# Patient Record
Sex: FEMALE | Race: WHITE | NOT HISPANIC OR LATINO | Employment: UNEMPLOYED | URBAN - METROPOLITAN AREA
[De-identification: names, ages, dates, MRNs, and addresses within clinical notes are randomized per-mention and may not be internally consistent; named-entity substitution may affect disease eponyms.]

---

## 2017-01-09 ENCOUNTER — THERAPY VISIT (OUTPATIENT)
Dept: PHYSICAL THERAPY | Facility: CLINIC | Age: 52
End: 2017-01-09
Payer: COMMERCIAL

## 2017-01-09 DIAGNOSIS — M25.511 ACUTE PAIN OF RIGHT SHOULDER: Primary | ICD-10-CM

## 2017-01-09 PROCEDURE — 97110 THERAPEUTIC EXERCISES: CPT | Mod: GP | Performed by: PHYSICAL THERAPIST

## 2017-01-09 PROCEDURE — 97140 MANUAL THERAPY 1/> REGIONS: CPT | Mod: GP | Performed by: PHYSICAL THERAPIST

## 2017-01-24 ENCOUNTER — THERAPY VISIT (OUTPATIENT)
Dept: PHYSICAL THERAPY | Facility: CLINIC | Age: 52
End: 2017-01-24
Payer: COMMERCIAL

## 2017-01-24 DIAGNOSIS — M25.511 ACUTE PAIN OF RIGHT SHOULDER: Primary | ICD-10-CM

## 2017-01-24 PROCEDURE — 97110 THERAPEUTIC EXERCISES: CPT | Mod: GP | Performed by: PHYSICAL THERAPIST

## 2017-01-24 PROCEDURE — 97140 MANUAL THERAPY 1/> REGIONS: CPT | Mod: GP | Performed by: PHYSICAL THERAPIST

## 2017-02-02 ENCOUNTER — THERAPY VISIT (OUTPATIENT)
Dept: PHYSICAL THERAPY | Facility: CLINIC | Age: 52
End: 2017-02-02
Payer: COMMERCIAL

## 2017-02-02 DIAGNOSIS — M25.511 ACUTE PAIN OF RIGHT SHOULDER: Primary | ICD-10-CM

## 2017-02-02 PROCEDURE — 97110 THERAPEUTIC EXERCISES: CPT | Mod: GP | Performed by: PHYSICAL THERAPIST

## 2017-02-02 PROCEDURE — 97140 MANUAL THERAPY 1/> REGIONS: CPT | Mod: GP | Performed by: PHYSICAL THERAPIST

## 2017-02-17 ENCOUNTER — THERAPY VISIT (OUTPATIENT)
Dept: PHYSICAL THERAPY | Facility: CLINIC | Age: 52
End: 2017-02-17
Payer: COMMERCIAL

## 2017-02-17 DIAGNOSIS — M25.511 ACUTE PAIN OF RIGHT SHOULDER: ICD-10-CM

## 2017-02-17 PROCEDURE — 97110 THERAPEUTIC EXERCISES: CPT | Mod: GP | Performed by: PHYSICAL THERAPIST

## 2017-02-17 PROCEDURE — 97140 MANUAL THERAPY 1/> REGIONS: CPT | Mod: GP | Performed by: PHYSICAL THERAPIST

## 2017-02-17 NOTE — PROGRESS NOTES
Subjective:    HPI                    Objective:    System    Physical Exam    General     ROS    Assessment/Plan:      PROGRESS  REPORT    Progress reporting period is from 12/29/2017 to 2/17/2017.       SUBJECTIVE  Subjective changes noted by patient:  The shoulder is better, but still gets pain at random times. More likely certain movements such as reaching out to the side, but not always. Most consistent pain is in the triceps in the morning. Currently pain is in the posterior shoulder.  Subjective:   Current Pain level: 2/10.     Initial Pain level: 9/10.   Changes in function:  Yes (See Goal flowsheet attached for changes in current functional level)  Adverse reaction to treatment or activity: None    OBJECTIVE  Changes noted in objective findings:  Yes, Patient demonstrates improved strength and less pain with testing.     Objective:    Cervical Screen: negative, negative spurlings  ULTT: Negative for radial nerve      Shoulder: (* indicates patient's pain)   MMT L MMT R   Flex/  Elevation  4+   GH Abd  4*   IR  4   ER  4+       Palpation: Tenderness at the lesser tubercle, scalene, and pec minor.     Special tests:   L R   Impingement     Neer's  (-)   Hawkin's-Joaquim  (-)   Internal impingement  (+)   Instability     Apprehension (anterior)  (-)   Relocation (anterior)  (-)   Biceps      Speed's  (-)     GH/Capsular Mobility  L  R   Posterior glide WNL WNL   Inferior glide WNL WNL   Anterior glide            ASSESSMENT/PLAN  Updated problem list and treatment plan:   Diagnosis 1: Biceps and Rotator cuff tendinitis of the right shoudler  Pain - hot/cold therapy, manual therapy, splint/taping/bracing/orthotics, self management, education, directional preference exercise and home program  Decreased ROM/flexibility - manual therapy, therapeutic exercise, therapeutic activity and home program  Decreased joint mobility - manual therapy, therapeutic exercise, therapeutic activity and home program  Decreased  strength - therapeutic exercise, therapeutic activities and home program  Decreased function - therapeutic activities and home program  Impaired posture - neuro re-education, therapeutic activities and home program  Instability - Therapeutic Activity, Therapeutic Exercise, Neuromuscular Re-education, Splinting/Taping/Bracing/Orthotic, home program    STG/LTGs have been met or progress has been made towards goals:  Yes (See Goal flow sheet completed today.)  Assessment of Progress: The patient's condition is improving.  The patient's condition has potential to improve.  Patient is meeting short term goals and is progressing towards long term goals.  Self Management Plans:  Patient is independent in a home treatment program.  Patient  has been instructed in self management of symptoms.  I have re-evaluated this patient and find that the nature, scope, duration and intensity of the therapy is appropriate for the medical condition of the patient.  Selene continues to require the following intervention to meet STG and LTG's:  PT    Recommendations:  This patient would benefit from continued therapy.     Frequency:  2 X a month, once daily  Duration:  for 2 months        Please refer to the daily flowsheet for treatment today, total treatment time and time spent performing 1:1 timed codes.

## 2017-02-20 ENCOUNTER — THERAPY VISIT (OUTPATIENT)
Dept: PHYSICAL THERAPY | Facility: CLINIC | Age: 52
End: 2017-02-20
Payer: COMMERCIAL

## 2017-02-20 DIAGNOSIS — M25.60 JOINT STIFFNESS OF SPINE: ICD-10-CM

## 2017-02-20 DIAGNOSIS — M54.42 CHRONIC MIDLINE LOW BACK PAIN WITH BILATERAL SCIATICA: ICD-10-CM

## 2017-02-20 DIAGNOSIS — M53.3 PAIN IN THE COCCYX: Primary | ICD-10-CM

## 2017-02-20 DIAGNOSIS — G89.29 CHRONIC MIDLINE LOW BACK PAIN WITH BILATERAL SCIATICA: ICD-10-CM

## 2017-02-20 DIAGNOSIS — M54.41 CHRONIC MIDLINE LOW BACK PAIN WITH BILATERAL SCIATICA: ICD-10-CM

## 2017-02-20 PROCEDURE — 97164 PT RE-EVAL EST PLAN CARE: CPT | Mod: GP | Performed by: PHYSICAL THERAPIST

## 2017-02-20 PROCEDURE — 97110 THERAPEUTIC EXERCISES: CPT | Mod: GP | Performed by: PHYSICAL THERAPIST

## 2017-02-20 NOTE — MR AVS SNAPSHOT
After Visit Summary   2/20/2017    Selene Lyn    MRN: [id number]           Patient Information     Date Of Birth          1965        Visit Information        Provider Department      2/20/2017 9:05 AM Nicki Kapadia, PT [facility]        Today's Diagnoses     Pain in the coccyx    -  1    Chronic midline low back pain with bilateral sciatica        Joint stiffness of spine           Follow-ups after your visit        Your next 10 appointments already scheduled     Feb 23, 2017 11:20 AM CST   [facility] Spine with Nicki Kapadia, PT   [facility] ([facility] [place]  )    [id number] [place] [address] Drive  [address] [place] [address]   [phone]            Mar 02, 2017 11:20 AM CST   [facility] Spine with Nicki Kapadia, PT   [facility] ([facility] [place]  )    [id number] [place] [address]  [address] [place] [address]   [phone]            Mar 03, 2017  8:50 AM CST   [facility] Extremity with Michael GUZMAN Ho   [facility] ([facility] [place]  )    [address] [place] [address]  [address] [place] [address]   [phone]            Mar 09, 2017  9:20 AM CST   [facility] Spine with Nicki Kapadia, PT   [facility] ([facility] [place]  )    [id number] [place] [address]  [address] [place] [address]   [phone]            Mar 23, 2017  8:00 AM CDT   [facility] Spine with Nicki Kapadia, PT   [facility] ([facility] [place]  )    [address] [place] [address]  [address] [place] [address]   [phone]              Who to contact     If you have questions or need follow up information about today's clinic visit or your schedule please contact [facility] directly at [phone].  Normal or non-critical lab and imaging results will be communicated to you by MyChart, letter or phone within 4 business days after the clinic has received the results. If you do not hear from us within 7 days, please contact the clinic through MyChart or phone. If you have a critical or abnormal lab result, we  "will notify you by phone as soon as possible.  Submit refill requests through [employer] or call your pharmacy and they will forward the refill request to us. Please allow 3 business days for your refill to be completed.          Additional Information About Your Visit        [employer] Information     [employer] lets you send messages to your doctor, view your test results, renew your prescriptions, schedule appointments and more. To sign up, go to [email or web address] . Click on \"Log in\" on the left side of the screen, which will take you to the Welcome page. Then click on \"Sign up Now\" on the right side of the page.     You will be asked to enter the access code listed below, as well as some personal information. Please follow the directions to create your username and password.     Your access code is: [email or web address]  Expires: 2017  8:50 AM     Your access code will  in 90 days. If you need help or a new code, please call your [place] clinic or [phone].        Care EveryWhere ID     This is your Care EveryWhere ID. This could be used by other organizations to access your [facility] medical records  [id number]         Blood Pressure from Last 3 Encounters:   16 98/62   10/11/16 110/70   16 104/60    Weight from Last 3 Encounters:   16 57.2 kg (126 lb 3.2 oz)   10/11/16 57.2 kg (126 lb)   16 56.7 kg (125 lb 1.6 oz)              We Performed the Following      PT RE-EVAL     [facility] PROGRESS NOTES REPORT     THERAPEUTIC EXERCISES        Primary Care Provider Office Phone # Fax #    Vonda Zaidi [phone]       [facility] PHYS [address]  [address]  [facility] [address]        Thank you!     Thank you for choosing [facility]  for your care. Our goal is always to provide you with excellent care. Hearing back from our patients is one way we can continue to improve our services. Please take a few minutes to complete the written survey that you " may receive in the mail after your visit with us. Thank you!             Your Updated Medication List - Protect others around you: Learn how to safely use, store and throw away your medicines at www.disposemymeds.org.          This list is accurate as of: 2/20/17 11:59 PM.  Always use your most recent med list.                   Brand Name Dispense Instructions for use    albuterol 108 (90 BASE) MCG/ACT Inhaler    albuterol    1 Inhaler    Inhale 2 puffs into the lungs every 6 hours as needed for shortness of breath / dyspnea       ALEVE PO          fluticasone 110 MCG/ACT Inhaler    FLOVENT HFA    3 Inhaler    Inhale 1 puff into the lungs 2 times daily During allergy season as needed       montelukast 10 MG tablet    SINGULAIR    30 tablet    Take 1 tablet (10 mg) by mouth At Bedtime       MULTIVITAMIN PO      Take  by mouth.       pantoprazole 40 MG EC tablet    PROTONIX    90 tablet    Take 1 tablet (40 mg) by mouth daily       VITAMIN D3          VITAMIN E NATURAL PO

## 2017-02-20 NOTE — LETTER
[facility]  [address] [address]  [address]  [facility] [address]  [phone]  2017    Re: Selene Lyn   :   1965  MRN:  [id number]   REFERRING PHYSICIAN:   Yohannes PERE[facility] PT  Date of Initial Evaluation:  2016  Visits:  Rxs Used: 21 (1st visit in 2017)  Reason for Referral:     Chronic midline low back pain with bilateral sciatica  Joint stiffness of spine  Pain in the coccyx  PROGRESS  REPORT  Progress reporting period is from 17.       SUBJECTIVE  Subjective changes noted by patient:   Tailbone pain started 2016 on drive home from [place]. WORSE with sitting on bleachers/hard surface, standing somewhat. No bowel/bladder symptoms. Some mild LBP because standing more. Sitting tolerance on good seat 20-30 minutes but then gets very uncomfortable. On a hard chair, limited to 1-2 minutes. 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     Current pain level is  Current Pain level: 6/10 (tailbone).     Previous pain level was  : 9/10 (tailbone).   Changes in function:  Yes (See Goal flowsheet attached for changes in current functional level)  Adverse reaction to treatment or activity: sitting    OBJECTIVE  Changes noted in objective findings:    Objective: Lumbar flexion to ankles painfree, extension WNL with end range stiffness. Pelvic alignment in standing PSIS level, decreased lordosis. Prone palpation to central coccyx are painful 8/10 but unable to feel coccyx in prone. With OLIVER, can feel coccyx. B glute squeeze = and painfree.      ASSESSMENT/PLAN  Updated problem list and treatment plan: Diagnosis 1:  Coccyx pain  Pain -  hot/cold therapy, manual therapy, splint/taping/bracing/orthotics, self management, education, directional preference exercise and home program  Decreased ROM/flexibility - manual therapy and therapeutic exercise  Decreased joint mobility - manual therapy and  therapeutic exercise  Decreased strength - therapeutic exercise and therapeutic activities  Inflammation - cold therapy and self management/home program  Impaired muscle performance - neuro re-education  Decreased function - therapeutic activities  Re: Selene Lyn   :   1965  Impaired posture - neuro re-education  STG/LTGs have been met or progress has been made towards goals:  Yes (See Goal flow sheet completed today.)  Assessment of Progress: The patient's progress has plateaued.  Self Management Plans:  Patient has been instructed in a home treatment program.  Patient  has been instructed in self management of symptoms.    Selene continues to require the following intervention to meet STG and LTG's:  PT    Recommendations:  This patient would benefit from continued therapy.     Frequency:  1 X week, once daily  Duration:  for 4 weeks              Thank you for your referral.    INQUIRIES  Therapist: DOROTHY Cao [facility]  [address] [address] [address]  Phone: [phone]  Fax: [phone]

## 2017-02-21 PROBLEM — M53.3 PAIN IN THE COCCYX: Status: ACTIVE | Noted: 2017-02-21

## 2017-02-21 NOTE — PROGRESS NOTES
Subjective:    HPI                    Objective:    System    Physical Exam    General     ROS    Assessment/Plan:      PROGRESS  REPORT    Progress reporting period is from 02/20/17.       SUBJECTIVE  Subjective changes noted by patient:   Tailbone pain started August 2016 on drive home from [place]. WORSE with sitting on bleachers/hard surface, standing somewhat. No bowel/bladder symptoms. Some mild LBP because standing more. Sitting tolerance on good seat 20-30 minutes but then gets very uncomfortable. On a hard chair, limited to 1-2 minutes. 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     Current pain level is  Current Pain level: 6/10 (tailbone).     Previous pain level was  : 9/10 (tailbone).   Changes in function:  Yes (See Goal flowsheet attached for changes in current functional level)  Adverse reaction to treatment or activity: sitting    OBJECTIVE  Changes noted in objective findings:    Objective: Lumbar flexion to ankles painfree, extension WNL with end range stiffness. Pelvic alignment in standing PSIS level, decreased lordosis. Prone palpation to central coccyx are painful 8/10 but unable to feel coccyx in prone. With OLIVER, can feel coccyx. B glute squeeze = and painfree.      ASSESSMENT/PLAN  Updated problem list and treatment plan: Diagnosis 1:  Coccyx pain  Pain -  hot/cold therapy, manual therapy, splint/taping/bracing/orthotics, self management, education, directional preference exercise and home program  Decreased ROM/flexibility - manual therapy and therapeutic exercise  Decreased joint mobility - manual therapy and therapeutic exercise  Decreased strength - therapeutic exercise and therapeutic activities  Inflammation - cold therapy and self management/home program  Impaired muscle performance - neuro re-education  Decreased function - therapeutic activities  Impaired posture - neuro re-education  STG/LTGs have been met or  progress has been made towards goals:  Yes (See Goal flow sheet completed today.)  Assessment of Progress: The patient's progress has plateaued.  Self Management Plans:  Patient has been instructed in a home treatment program.  Patient  has been instructed in self management of symptoms.    Selene continues to require the following intervention to meet STG and LTG's:  PT    Recommendations:  This patient would benefit from continued therapy.     Frequency:  1 X week, once daily  Duration:  for 4 weeks        Please refer to the daily flowsheet for treatment today, total treatment time and time spent performing 1:1 timed codes.

## 2017-03-02 ENCOUNTER — THERAPY VISIT (OUTPATIENT)
Dept: PHYSICAL THERAPY | Facility: CLINIC | Age: 52
End: 2017-03-02
Payer: COMMERCIAL

## 2017-03-02 DIAGNOSIS — M54.42 CHRONIC MIDLINE LOW BACK PAIN WITH BILATERAL SCIATICA: ICD-10-CM

## 2017-03-02 DIAGNOSIS — G89.29 CHRONIC MIDLINE LOW BACK PAIN WITH BILATERAL SCIATICA: ICD-10-CM

## 2017-03-02 DIAGNOSIS — M53.3 PAIN IN THE COCCYX: ICD-10-CM

## 2017-03-02 DIAGNOSIS — M25.60 JOINT STIFFNESS OF SPINE: ICD-10-CM

## 2017-03-02 DIAGNOSIS — M54.41 CHRONIC MIDLINE LOW BACK PAIN WITH BILATERAL SCIATICA: ICD-10-CM

## 2017-03-02 PROCEDURE — 97530 THERAPEUTIC ACTIVITIES: CPT | Mod: GP | Performed by: PHYSICAL THERAPIST

## 2017-03-02 PROCEDURE — 97140 MANUAL THERAPY 1/> REGIONS: CPT | Mod: GP | Performed by: PHYSICAL THERAPIST

## 2017-03-03 ENCOUNTER — THERAPY VISIT (OUTPATIENT)
Dept: PHYSICAL THERAPY | Facility: CLINIC | Age: 52
End: 2017-03-03
Payer: COMMERCIAL

## 2017-03-03 DIAGNOSIS — M25.511 ACUTE PAIN OF RIGHT SHOULDER: ICD-10-CM

## 2017-03-03 PROCEDURE — 97140 MANUAL THERAPY 1/> REGIONS: CPT | Mod: GP | Performed by: PHYSICAL THERAPIST

## 2017-03-03 PROCEDURE — 97110 THERAPEUTIC EXERCISES: CPT | Mod: GP | Performed by: PHYSICAL THERAPIST

## 2017-03-09 ENCOUNTER — THERAPY VISIT (OUTPATIENT)
Dept: PHYSICAL THERAPY | Facility: CLINIC | Age: 52
End: 2017-03-09
Payer: COMMERCIAL

## 2017-03-09 DIAGNOSIS — M54.41 CHRONIC MIDLINE LOW BACK PAIN WITH BILATERAL SCIATICA: ICD-10-CM

## 2017-03-09 DIAGNOSIS — M53.3 PAIN IN THE COCCYX: ICD-10-CM

## 2017-03-09 DIAGNOSIS — G89.29 CHRONIC MIDLINE LOW BACK PAIN WITH BILATERAL SCIATICA: ICD-10-CM

## 2017-03-09 DIAGNOSIS — M25.60 JOINT STIFFNESS OF SPINE: ICD-10-CM

## 2017-03-09 DIAGNOSIS — M54.42 CHRONIC MIDLINE LOW BACK PAIN WITH BILATERAL SCIATICA: ICD-10-CM

## 2017-03-09 PROCEDURE — 97140 MANUAL THERAPY 1/> REGIONS: CPT | Mod: GP | Performed by: PHYSICAL THERAPIST

## 2017-03-09 PROCEDURE — 97110 THERAPEUTIC EXERCISES: CPT | Mod: GP | Performed by: PHYSICAL THERAPIST

## 2017-03-23 ENCOUNTER — THERAPY VISIT (OUTPATIENT)
Dept: PHYSICAL THERAPY | Facility: CLINIC | Age: 52
End: 2017-03-23
Payer: COMMERCIAL

## 2017-03-23 DIAGNOSIS — M54.41 CHRONIC MIDLINE LOW BACK PAIN WITH BILATERAL SCIATICA: ICD-10-CM

## 2017-03-23 DIAGNOSIS — M54.42 CHRONIC MIDLINE LOW BACK PAIN WITH BILATERAL SCIATICA: ICD-10-CM

## 2017-03-23 DIAGNOSIS — G89.29 CHRONIC MIDLINE LOW BACK PAIN WITH BILATERAL SCIATICA: ICD-10-CM

## 2017-03-23 DIAGNOSIS — M53.3 PAIN IN THE COCCYX: ICD-10-CM

## 2017-03-23 DIAGNOSIS — M25.60 JOINT STIFFNESS OF SPINE: ICD-10-CM

## 2017-03-23 PROCEDURE — 97110 THERAPEUTIC EXERCISES: CPT | Mod: GP | Performed by: PHYSICAL THERAPIST

## 2017-03-23 PROCEDURE — 97140 MANUAL THERAPY 1/> REGIONS: CPT | Mod: GP | Performed by: PHYSICAL THERAPIST

## 2017-03-28 ENCOUNTER — THERAPY VISIT (OUTPATIENT)
Dept: PHYSICAL THERAPY | Facility: CLINIC | Age: 52
End: 2017-03-28
Payer: COMMERCIAL

## 2017-03-28 DIAGNOSIS — M25.511 ACUTE PAIN OF RIGHT SHOULDER: ICD-10-CM

## 2017-03-28 PROCEDURE — 97110 THERAPEUTIC EXERCISES: CPT | Mod: GP | Performed by: PHYSICAL THERAPIST

## 2017-03-28 PROCEDURE — 97140 MANUAL THERAPY 1/> REGIONS: CPT | Mod: GP | Performed by: PHYSICAL THERAPIST

## 2017-03-28 NOTE — PROGRESS NOTES
Subjective:    HPI                    Objective:    System    Physical Exam    General     ROS    Assessment/Plan:      PROGRESS  REPORT    Progress reporting period is from 2/17/2107 to 3/28/2017.       SUBJECTIVE  Subjective changes noted by patient: Hit some golf balls on sunday and has some achy spots in the pec and posterior shoudler blade. The spot in the pec has a sharp quality to it. Prior to that, she had been doing really well.     Current Pain level: 4/10.     Initial Pain level: 9/10.   Changes in function:  Yes (See Goal flowsheet attached for changes in current functional level)  Adverse reaction to treatment or activity: None    OBJECTIVE  Changes noted in objective findings:  Yes, Improved strength.     Shoulder: (* indicates patient's pain)   MMT L MMT R   Flex/  Elevation  5-   GH Abd  5-   IR  5-   ER  5-     Palpation: Tenderness at the lesser tubercle, improved with subscapular release.     Special tests:   L R   Impingement     Internal impingement  (+)   Instability     Relocation (anterior)  (+)       ASSESSMENT/PLAN  Updated problem list and treatment plan: Diagnosis 1:  Biceps and Rotator cuff tendinitis of the right shoulder  STG/LTGs have been met or progress has been made towards goals:  Yes (See Goal flow sheet completed today.)  Assessment of Progress: The patient's condition is improving.  The patient's condition has potential to improve.  Self Management Plans:  Patient is independent in a home treatment program.  Patient is independent in self management of symptoms.  I have re-evaluated this patient and find that the nature, scope, duration and intensity of the therapy is appropriate for the medical condition of the patient.  Selene continues to require the following intervention to meet STG and LTG's:  PT    Recommendations:  Patient has been instructed to return if current symptoms do not subside. Prior to the exacerbation from golfing, she had been doing very well.   Patient will  return if needed, otherwise she appears ready to be discharged from therapy.     Please refer to the daily flowsheet for treatment today, total treatment time and time spent performing 1:1 timed codes.

## 2017-04-03 ENCOUNTER — THERAPY VISIT (OUTPATIENT)
Dept: PHYSICAL THERAPY | Facility: CLINIC | Age: 52
End: 2017-04-03
Payer: COMMERCIAL

## 2017-04-03 DIAGNOSIS — M53.3 PAIN IN THE COCCYX: ICD-10-CM

## 2017-04-03 DIAGNOSIS — M25.60 JOINT STIFFNESS OF SPINE: ICD-10-CM

## 2017-04-03 DIAGNOSIS — G89.29 CHRONIC MIDLINE LOW BACK PAIN WITH BILATERAL SCIATICA: ICD-10-CM

## 2017-04-03 DIAGNOSIS — M54.42 CHRONIC MIDLINE LOW BACK PAIN WITH BILATERAL SCIATICA: ICD-10-CM

## 2017-04-03 DIAGNOSIS — M54.41 CHRONIC MIDLINE LOW BACK PAIN WITH BILATERAL SCIATICA: ICD-10-CM

## 2017-04-03 PROCEDURE — 97140 MANUAL THERAPY 1/> REGIONS: CPT | Mod: GP | Performed by: PHYSICAL THERAPIST

## 2017-04-03 PROCEDURE — 97110 THERAPEUTIC EXERCISES: CPT | Mod: GP | Performed by: PHYSICAL THERAPIST

## 2017-04-03 NOTE — LETTER
[facility]  [address] [address]  [address]  [facility] [address]  [phone]    April 3, 2017    Re: Selene Lyn   :   1965  MRN:  [id number]   REFERRING PHYSICIAN:   Yohannes [facility]ERE[facility] PT    Date of Initial Evaluation: 2016  Visits:  Rxs Used: 5  Reason for Referral:     Pain in the coccyx  Chronic midline low back pain with bilateral sciatica  Joint stiffness of spine    PROGRESS  REPORT    Progress reporting period is from 17.       SUBJECTIVE  Subjective changes noted by patient:  Better this week. Able to sit longer 25 minutes on cushion chair.  Having more good days than bad days. Denies any clicking/popping. Pain varies from 0-5/10. Will linger for a few minutes after standing. Standing much less painful after sitting.     Current pain level is  Current Pain level: 0/10 (varies 0-5/10. ).     Previous pain level was   Initial Pain level: 9/10 (tailbone).   Changes in function:  Yes (See Goal flowsheet attached for changes in current functional level)  Adverse reaction to treatment or activity: None  OBJECTIVE  Changes noted in objective findings:    Objective: Improving coccyx mobility into extension. No deviation R or L. Levator ani /puborectalis muscle L>R but softens up nicely.    ASSESSMENT/PLAN  Updated problem list and treatment plan: Diagnosis 1:  Coccyx pain  Pain -  hot/cold therapy, manual therapy, splint/taping/bracing/orthotics, self management, education, directional preference exercise and home program  Decreased ROM/flexibility - manual therapy and therapeutic exercise  Decreased joint mobility - manual therapy and therapeutic exercise  Decreased strength - therapeutic exercise and therapeutic activities  Decreased proprioception - neuro re-education and therapeutic activities  Impaired muscle performance - neuro re-education  Decreased function - therapeutic activities  Impaired posture - neuro re-education  STG/LTGs have been met or progress has  been made towards goals:  Yes (See Goal flow sheet completed today.)  Assessment of Progress: The patient's condition is improving.  Self Management Plans:  Patient has been instructed in a home treatment program.  Patient  has been instructed in self management of symptoms.    Selene continues to require the following intervention to meet STG and LTG's:  PT  Re: Selene Lyn   :   1965    Recommendations:  This patient would benefit from continued therapy.     Frequency:  2 X a month, once daily  Duration:  for 1 months    Thank you for your referral.    INQUIRIES  Therapist: Nicki Kapadia, MS, PT, OCS  [facility]  [address] [address] [address]  Phone: [phone]  Fax: [phone]

## 2017-04-03 NOTE — MR AVS SNAPSHOT
"              After Visit Summary   4/3/2017    Selene Lyn    MRN: [id number]           Patient Information     Date Of Birth          1965        Visit Information        Provider Department      4/3/2017 11:45 AM Nicki Kapadia, [facility]        Today's Diagnoses     Pain in the coccyx        Chronic midline low back pain with bilateral sciatica        Joint stiffness of spine           Follow-ups after your visit        Your next 10 appointments already scheduled     Apr 20, 2017  8:00 AM CDT   [facility] Spine with Nicki Kapadia PT   [facility] ([facility]  )    [address] [facility]  [address] [place] [address]   [phone]            May 04, 2017  8:00 AM CDT   [facility] Spine with BRAEDEN Cao PT ([facility]  )    [address] [facility]  [address] [place] [address]   [phone]              Who to contact     If you have questions or need follow up information about today's clinic visit or your schedule please contact [facility] directly at [phone].  Normal or non-critical lab and imaging results will be communicated to you by [employer], letter or phone within 4 business days after the clinic has received the results. If you do not hear from us within 7 days, please contact the clinic through [employer] or phone. If you have a critical or abnormal lab result, we will notify you by phone as soon as possible.  Submit refill requests through [employer] or call your pharmacy and they will forward the refill request to us. Please allow 3 business days for your refill to be completed.          Additional Information About Your Visit        [employer] Information     [employer] lets you send messages to your doctor, view your test results, renew your prescriptions, schedule appointments and more. To sign up, go to [email or web address] . Click on \"Log in\" on the left side of the screen, which will take you to the Welcome page. Then " "click on \"Sign up Now\" on the right side of the page.     You will be asked to enter the access code listed below, as well as some personal information. Please follow the directions to create your username and password.     Your access code is: [email or web address]  Expires: 2017  9:50 AM     Your access code will  in 90 days. If you need help or a new code, please call your [place] clinic or [phone].        Care EveryWhere ID     This is your Care EveryWhere ID. This could be used by other organizations to access your [place] medical records  [id number]         Blood Pressure from Last 3 Encounters:   16 98/62   10/11/16 110/70   16 104/60    Weight from Last 3 Encounters:   16 57.2 kg (126 lb 3.2 oz)   10/11/16 57.2 kg (126 lb)   16 56.7 kg (125 lb 1.6 oz)              We Performed the Following     [facility] PROGRESS NOTES REPORT     MANUAL THER TECH,1+REGIONS,EA 15 MIN     THERAPEUTIC EXERCISES        Primary Care Provider Office Phone # Fax #    Vonda Zaidi [phone]       [facility] PHYS [address]  [address]  [facility] [address]        Thank you!     Thank you for choosing [facility]  for your care. Our goal is always to provide you with excellent care. Hearing back from our patients is one way we can continue to improve our services. Please take a few minutes to complete the written survey that you may receive in the mail after your visit with us. Thank you!             Your Updated Medication List - Protect others around you: Learn how to safely use, store and throw away your medicines at www.disposemymeds.org.          This list is accurate as of: 4/3/17 12:33 PM.  Always use your most recent med list.                   Brand Name Dispense Instructions for use    albuterol 108 (90 BASE) MCG/ACT Inhaler    albuterol    1 Inhaler    Inhale 2 puffs into the lungs every 6 hours as needed for shortness of breath / dyspnea       ALEVE " PO          fluticasone 110 MCG/ACT Inhaler    FLOVENT HFA    3 Inhaler    Inhale 1 puff into the lungs 2 times daily During allergy season as needed       montelukast 10 MG tablet    SINGULAIR    30 tablet    Take 1 tablet (10 mg) by mouth At Bedtime       MULTIVITAMIN PO      Take  by mouth.       pantoprazole 40 MG EC tablet    PROTONIX    90 tablet    Take 1 tablet (40 mg) by mouth daily       VITAMIN D3          VITAMIN E NATURAL PO

## 2017-04-03 NOTE — PROGRESS NOTES
Subjective:    HPI                    Objective:    System    Physical Exam    General     ROS    Assessment/Plan:      PROGRESS  REPORT    Progress reporting period is from 04/03/17.       SUBJECTIVE  Subjective changes noted by patient:  Better this week. Able to sit longer 25 minutes on cushion chair.  Having more good days than bad days. Denies any clicking/popping. Pain varies from 0-5/10. Will linger for a few minutes after standing. Standing much less painful after sitting.     Current pain level is  Current Pain level: 0/10 (varies 0-5/10. ).     Previous pain level was   Initial Pain level: 9/10 (tailbone).   Changes in function:  Yes (See Goal flowsheet attached for changes in current functional level)  Adverse reaction to treatment or activity: None    OBJECTIVE  Changes noted in objective findings:    Objective: Improving coccyx mobility into extension. No deviation R or L. Levator ani /puborectalis muscle L>R but softens up nicely.      ASSESSMENT/PLAN  Updated problem list and treatment plan: Diagnosis 1:  Coccyx pain  Pain -  hot/cold therapy, manual therapy, splint/taping/bracing/orthotics, self management, education, directional preference exercise and home program  Decreased ROM/flexibility - manual therapy and therapeutic exercise  Decreased joint mobility - manual therapy and therapeutic exercise  Decreased strength - therapeutic exercise and therapeutic activities  Decreased proprioception - neuro re-education and therapeutic activities  Impaired muscle performance - neuro re-education  Decreased function - therapeutic activities  Impaired posture - neuro re-education  STG/LTGs have been met or progress has been made towards goals:  Yes (See Goal flow sheet completed today.)  Assessment of Progress: The patient's condition is improving.  Self Management Plans:  Patient has been instructed in a home treatment program.  Patient  has been instructed in self management of symptoms.    June continues  to require the following intervention to meet STG and LTG's:  PT    Recommendations:  This patient would benefit from continued therapy.     Frequency:  2 X a month, once daily  Duration:  for 1 months        Please refer to the daily flowsheet for treatment today, total treatment time and time spent performing 1:1 timed codes.

## 2017-04-19 ENCOUNTER — THERAPY VISIT (OUTPATIENT)
Dept: PHYSICAL THERAPY | Facility: CLINIC | Age: 52
End: 2017-04-19
Payer: COMMERCIAL

## 2017-04-19 DIAGNOSIS — M54.42 CHRONIC MIDLINE LOW BACK PAIN WITH BILATERAL SCIATICA: ICD-10-CM

## 2017-04-19 DIAGNOSIS — G89.29 CHRONIC MIDLINE LOW BACK PAIN WITH BILATERAL SCIATICA: ICD-10-CM

## 2017-04-19 DIAGNOSIS — M25.60 JOINT STIFFNESS OF SPINE: ICD-10-CM

## 2017-04-19 DIAGNOSIS — M53.3 PAIN IN THE COCCYX: ICD-10-CM

## 2017-04-19 DIAGNOSIS — M54.41 CHRONIC MIDLINE LOW BACK PAIN WITH BILATERAL SCIATICA: ICD-10-CM

## 2017-04-19 PROCEDURE — 97140 MANUAL THERAPY 1/> REGIONS: CPT | Mod: GP | Performed by: PHYSICAL THERAPIST

## 2017-04-19 PROCEDURE — 97110 THERAPEUTIC EXERCISES: CPT | Mod: GP | Performed by: PHYSICAL THERAPIST

## 2017-05-04 ENCOUNTER — THERAPY VISIT (OUTPATIENT)
Dept: PHYSICAL THERAPY | Facility: CLINIC | Age: 52
End: 2017-05-04
Payer: COMMERCIAL

## 2017-05-04 DIAGNOSIS — M54.42 CHRONIC MIDLINE LOW BACK PAIN WITH BILATERAL SCIATICA: ICD-10-CM

## 2017-05-04 DIAGNOSIS — M53.3 PAIN IN THE COCCYX: ICD-10-CM

## 2017-05-04 DIAGNOSIS — G89.29 CHRONIC MIDLINE LOW BACK PAIN WITH BILATERAL SCIATICA: ICD-10-CM

## 2017-05-04 DIAGNOSIS — M54.41 CHRONIC MIDLINE LOW BACK PAIN WITH BILATERAL SCIATICA: ICD-10-CM

## 2017-05-04 DIAGNOSIS — M25.60 JOINT STIFFNESS OF SPINE: ICD-10-CM

## 2017-05-04 PROCEDURE — 97110 THERAPEUTIC EXERCISES: CPT | Mod: GP | Performed by: PHYSICAL THERAPIST

## 2017-05-04 PROCEDURE — 97140 MANUAL THERAPY 1/> REGIONS: CPT | Mod: GP | Performed by: PHYSICAL THERAPIST

## 2017-05-10 NOTE — PROGRESS NOTES
Update as of May 10, 2017: Patient not returned to clinic. This progress note shall serve as a discharge note.

## 2017-05-18 ENCOUNTER — THERAPY VISIT (OUTPATIENT)
Dept: PHYSICAL THERAPY | Facility: CLINIC | Age: 52
End: 2017-05-18
Payer: COMMERCIAL

## 2017-05-18 DIAGNOSIS — M53.3 PAIN IN THE COCCYX: ICD-10-CM

## 2017-05-18 DIAGNOSIS — G89.29 CHRONIC MIDLINE LOW BACK PAIN WITH BILATERAL SCIATICA: ICD-10-CM

## 2017-05-18 DIAGNOSIS — M25.60 JOINT STIFFNESS OF SPINE: ICD-10-CM

## 2017-05-18 DIAGNOSIS — M54.41 CHRONIC MIDLINE LOW BACK PAIN WITH BILATERAL SCIATICA: ICD-10-CM

## 2017-05-18 DIAGNOSIS — M54.42 CHRONIC MIDLINE LOW BACK PAIN WITH BILATERAL SCIATICA: ICD-10-CM

## 2017-05-18 PROCEDURE — 97140 MANUAL THERAPY 1/> REGIONS: CPT | Mod: GP | Performed by: PHYSICAL THERAPIST

## 2017-05-18 PROCEDURE — 97110 THERAPEUTIC EXERCISES: CPT | Mod: GP | Performed by: PHYSICAL THERAPIST

## 2017-06-06 ENCOUNTER — THERAPY VISIT (OUTPATIENT)
Dept: PHYSICAL THERAPY | Facility: CLINIC | Age: 52
End: 2017-06-06
Payer: COMMERCIAL

## 2017-06-06 DIAGNOSIS — G89.29 CHRONIC MIDLINE LOW BACK PAIN WITH BILATERAL SCIATICA: ICD-10-CM

## 2017-06-06 DIAGNOSIS — M54.41 CHRONIC MIDLINE LOW BACK PAIN WITH BILATERAL SCIATICA: ICD-10-CM

## 2017-06-06 DIAGNOSIS — M53.3 PAIN IN THE COCCYX: ICD-10-CM

## 2017-06-06 DIAGNOSIS — M54.42 CHRONIC MIDLINE LOW BACK PAIN WITH BILATERAL SCIATICA: ICD-10-CM

## 2017-06-06 DIAGNOSIS — M25.60 JOINT STIFFNESS OF SPINE: ICD-10-CM

## 2017-06-06 PROCEDURE — 97110 THERAPEUTIC EXERCISES: CPT | Mod: GP | Performed by: PHYSICAL THERAPIST

## 2017-06-06 PROCEDURE — 97140 MANUAL THERAPY 1/> REGIONS: CPT | Mod: GP | Performed by: PHYSICAL THERAPIST

## 2017-06-06 NOTE — MR AVS SNAPSHOT
After Visit Summary   6/6/2017    Selene Lyn    MRN: [id number]           Patient Information     Date Of Birth          1965        Visit Information        Provider Department      6/6/2017 7:45 AM Nicki Kapadia, [facility]        Today's Diagnoses     Pain in the coccyx        Chronic midline low back pain with bilateral sciatica        Joint stiffness of spine           Follow-ups after your visit        Your next 10 appointments already scheduled     Jun 14, 2017  4:05 PM CDT   MA SCREENING BILATERAL W/ MELQUIADES with [id number]   [facility]    [address], [address]  [place] [address]   [phone]           Do not use any powder, lotion or deodorant under your arms or on your breast. If you do, we will ask you to remove it before your exam.  Wear comfortable, two-piece clothing.  If you have any allergies, tell your care team.  Bring any previous mammograms from other facilities or have them mailed to the breast center.            Jul 11, 2017  4:15 PM CDT   [facility] ([facility]  )    [address]  [address]  [place] [address]   [phone]              Who to contact     If you have questions or need follow up information about today's clinic visit or your schedule please contact [facility] directly at [phone].  Normal or non-critical lab and imaging results will be communicated to you by MyChart, letter or phone within 4 business days after the clinic has received the results. If you do not hear from us within 7 days, please contact the clinic through MyChart or phone. If you have a critical or abnormal lab result, we will notify you by phone as soon as possible.  Submit refill requests through [employer] or call your pharmacy and they will forward the refill request to us. Please allow 3 business days for your refill to be completed.     "      Additional Information About Your Visit        MyChart Information     [employer] lets you send messages to your doctor, view your test results, renew your prescriptions, schedule appointments and more. To sign up, go to [email or web address] . Click on \"Log in\" on the left side of the screen, which will take you to the Welcome page. Then click on \"Sign up Now\" on the right side of the page.     You will be asked to enter the access code listed below, as well as some personal information. Please follow the directions to create your username and password.     Your access code is: [email or web address]  Expires: 2017  8:21 AM     Your access code will  in 90 days. If you need help or a new code, please call your [place] clinic or [phone].        Care EveryWhere ID     This is your Care EveryWhere ID. This could be used by other organizations to access your [place] medical records  [id number]         Blood Pressure from Last 3 Encounters:   16 98/62   10/11/16 110/70   16 104/60    Weight from Last 3 Encounters:   16 57.2 kg (126 lb 3.2 oz)   10/11/16 57.2 kg (126 lb)   16 56.7 kg (125 lb 1.6 oz)              We Performed the Following     [facility] PROGRESS NOTES REPORT     MANUAL THER TECH,1+REGIONS,EA 15 MIN     THERAPEUTIC EXERCISES        Primary Care Provider Office Phone # Fax #    Vonda Zaidi [phone]       [facility] [address]  [facility] [address]        Thank you!     Thank you for choosing [facility]  for your care. Our goal is always to provide you with excellent care. Hearing back from our patients is one way we can continue to improve our services. Please take a few minutes to complete the written survey that you may receive in the mail after your visit with us. Thank you!             Your Updated Medication List - Protect others around you: Learn how to safely use, store and throw away your medicines at " www.disposemymeds.org.          This list is accurate as of: 6/6/17  8:21 AM.  Always use your most recent med list.                   Brand Name Dispense Instructions for use    albuterol 108 (90 BASE) MCG/ACT Inhaler    albuterol    1 Inhaler    Inhale 2 puffs into the lungs every 6 hours as needed for shortness of breath / dyspnea       ALEVE PO          fluticasone 110 MCG/ACT Inhaler    FLOVENT HFA    3 Inhaler    Inhale 1 puff into the lungs 2 times daily During allergy season as needed       montelukast 10 MG tablet    SINGULAIR    30 tablet    Take 1 tablet (10 mg) by mouth At Bedtime       MULTIVITAMIN PO      Take  by mouth.       pantoprazole 40 MG EC tablet    PROTONIX    90 tablet    Take 1 tablet (40 mg) by mouth daily       VITAMIN D3          VITAMIN E NATURAL PO

## 2017-06-06 NOTE — LETTER
"[facility]  [address]  [address]  [place] [facility] [address]  [phone]    2017  Re: Selene Lyn   :   1965  MRN:  [id number]   REFERRING PHYSICIAN:   Yohannes PEREZ PT  Date of Initial Evaluation:  2016  Visits:  Rxs Used: 9  Reason for Referral:     Pain in the coccyx  Chronic midline low back pain with bilateral sciatica  Joint stiffness of spine    PROGRESS  REPORT  Progress reporting period is from 17.       SUBJECTIVE  Subjective changes noted by patient:  .  Subjective: Tailbone continues to improve slightly. Still needs cushion for long sitting days. Can tolerate sitting in other chairs longer. Overall  \"much better\" than last April.     Current pain level is 3/10 with sitting  .     Previous pain level was   Initial Pain level: 9/10 (tailbone).   Changes in function:  Yes (See Goal flowsheet attached for changes in current functional level)  Adverse reaction to treatment or activity: None    OBJECTIVE  Changes noted in objective findings:  Yes,   Objective: 90% improvement in pelvic floor muscle tenderness and coccyx alignment. Coccyx still in slight flexion but improving.      ASSESSMENT/PLAN  Updated problem list and treatment plan: Diagnosis 1:  Coccyx pain  Pain -  hot/cold therapy, manual therapy, splint/taping/bracing/orthotics, self management, education, directional preference exercise and home program  Decreased ROM/flexibility - manual therapy and therapeutic exercise  Decreased strength - therapeutic exercise and therapeutic activities  Decreased proprioception - neuro re-education and therapeutic activities  Inflammation - cold therapy and self management/home program  Impaired muscle performance - neuro re-education  Decreased function - therapeutic activities  Impaired posture - neuro re-education  STG/LTGs have been met or progress has been made towards goals:  Yes (See Goal flow sheet completed today.)  Assessment of Progress: " The patient's condition is improving.  Self Management Plans:  Patient has been instructed in a home treatment program.  Patient  has been instructed in self management of symptoms.  Selene continues to require the following intervention to meet STG and LTG's:  PT  Re: Selene Lyn   :   1965    Recommendations:  This patient would benefit from continued therapy.     Frequency:  1 X a month, once daily  Duration:  for 1-2 months    Thank you for your referral.    INQUIRIES  Therapist: Nicki Kapadia, MS, PT, OCS   [facility]  [address] [address] [address]  Phone: [phone]  Fax: [phone]

## 2017-06-06 NOTE — PROGRESS NOTES
"Subjective:    HPI                    Objective:    System    Physical Exam    General     ROS    Assessment/Plan:      PROGRESS  REPORT    Progress reporting period is from 06/06/17.       SUBJECTIVE  Subjective changes noted by patient:  .  Subjective: Tailbone continues to improve slightly. Still needs cushion for long sitting days. Can tolerate sitting in other chairs longer. Overall  \"much better\" than last April.     Current pain level is 3/10 with sitting  .     Previous pain level was   Initial Pain level: 9/10 (tailbone).   Changes in function:  Yes (See Goal flowsheet attached for changes in current functional level)  Adverse reaction to treatment or activity: None    OBJECTIVE  Changes noted in objective findings:  Yes,   Objective: 90% improvement in pelvic floor muscle tenderness and coccyx alignment. Coccyx still in slight flexion but improving.      ASSESSMENT/PLAN  Updated problem list and treatment plan: Diagnosis 1:  Coccyx pain  Pain -  hot/cold therapy, manual therapy, splint/taping/bracing/orthotics, self management, education, directional preference exercise and home program  Decreased ROM/flexibility - manual therapy and therapeutic exercise  Decreased strength - therapeutic exercise and therapeutic activities  Decreased proprioception - neuro re-education and therapeutic activities  Inflammation - cold therapy and self management/home program  Impaired muscle performance - neuro re-education  Decreased function - therapeutic activities  Impaired posture - neuro re-education  STG/LTGs have been met or progress has been made towards goals:  Yes (See Goal flow sheet completed today.)  Assessment of Progress: The patient's condition is improving.  Self Management Plans:  Patient has been instructed in a home treatment program.  Patient  has been instructed in self management of symptoms.    June continues to require the following intervention to meet STG and LTG's:  PT    Recommendations:  This " patient would benefit from continued therapy.     Frequency:  1 X a month, once daily  Duration:  for 1-2 months        Please refer to the daily flowsheet for treatment today, total treatment time and time spent performing 1:1 timed codes.

## 2017-06-14 ENCOUNTER — HOSPITAL ENCOUNTER (OUTPATIENT)
Dept: MAMMOGRAPHY | Facility: CLINIC | Age: 52
Discharge: HOME | End: 2017-06-14
Attending: FAMILY MEDICINE | Admitting: FAMILY MEDICINE
Payer: COMMERCIAL

## 2017-06-14 DIAGNOSIS — Z12.31 VISIT FOR SCREENING MAMMOGRAM: ICD-10-CM

## 2017-06-14 PROCEDURE — 77063 BREAST TOMOSYNTHESIS BI: CPT

## 2017-07-06 ENCOUNTER — THERAPY VISIT (OUTPATIENT)
Dept: PHYSICAL THERAPY | Facility: CLINIC | Age: 52
End: 2017-07-06
Payer: COMMERCIAL

## 2017-07-06 DIAGNOSIS — M25.60 JOINT STIFFNESS OF SPINE: ICD-10-CM

## 2017-07-06 DIAGNOSIS — M54.41 CHRONIC MIDLINE LOW BACK PAIN WITH BILATERAL SCIATICA: ICD-10-CM

## 2017-07-06 DIAGNOSIS — M53.3 PAIN IN THE COCCYX: ICD-10-CM

## 2017-07-06 DIAGNOSIS — M54.42 CHRONIC MIDLINE LOW BACK PAIN WITH BILATERAL SCIATICA: ICD-10-CM

## 2017-07-06 DIAGNOSIS — G89.29 CHRONIC MIDLINE LOW BACK PAIN WITH BILATERAL SCIATICA: ICD-10-CM

## 2017-07-06 PROCEDURE — 97112 NEUROMUSCULAR REEDUCATION: CPT | Mod: GP | Performed by: PHYSICAL THERAPIST

## 2017-07-06 PROCEDURE — 97110 THERAPEUTIC EXERCISES: CPT | Mod: GP | Performed by: PHYSICAL THERAPIST

## 2017-07-12 ENCOUNTER — THERAPY VISIT (OUTPATIENT)
Dept: PHYSICAL THERAPY | Facility: CLINIC | Age: 52
End: 2017-07-12
Payer: COMMERCIAL

## 2017-07-12 DIAGNOSIS — M54.41 CHRONIC MIDLINE LOW BACK PAIN WITH BILATERAL SCIATICA: ICD-10-CM

## 2017-07-12 DIAGNOSIS — M54.42 CHRONIC MIDLINE LOW BACK PAIN WITH BILATERAL SCIATICA: ICD-10-CM

## 2017-07-12 DIAGNOSIS — M53.3 PAIN IN THE COCCYX: ICD-10-CM

## 2017-07-12 DIAGNOSIS — G89.29 CHRONIC MIDLINE LOW BACK PAIN WITH BILATERAL SCIATICA: ICD-10-CM

## 2017-07-12 DIAGNOSIS — M25.60 JOINT STIFFNESS OF SPINE: ICD-10-CM

## 2017-07-12 PROCEDURE — 97140 MANUAL THERAPY 1/> REGIONS: CPT | Mod: GP | Performed by: PHYSICAL THERAPIST

## 2017-07-12 PROCEDURE — 97035 APP MDLTY 1+ULTRASOUND EA 15: CPT | Mod: GP | Performed by: PHYSICAL THERAPIST

## 2017-07-12 PROCEDURE — 97110 THERAPEUTIC EXERCISES: CPT | Mod: GP | Performed by: PHYSICAL THERAPIST

## 2017-07-27 ENCOUNTER — THERAPY VISIT (OUTPATIENT)
Dept: PHYSICAL THERAPY | Facility: CLINIC | Age: 52
End: 2017-07-27
Payer: COMMERCIAL

## 2017-07-27 DIAGNOSIS — M54.42 CHRONIC MIDLINE LOW BACK PAIN WITH BILATERAL SCIATICA: ICD-10-CM

## 2017-07-27 DIAGNOSIS — M53.3 PAIN IN THE COCCYX: ICD-10-CM

## 2017-07-27 DIAGNOSIS — M54.41 CHRONIC MIDLINE LOW BACK PAIN WITH BILATERAL SCIATICA: ICD-10-CM

## 2017-07-27 DIAGNOSIS — G89.29 CHRONIC MIDLINE LOW BACK PAIN WITH BILATERAL SCIATICA: ICD-10-CM

## 2017-07-27 DIAGNOSIS — M25.60 JOINT STIFFNESS OF SPINE: ICD-10-CM

## 2017-07-27 PROCEDURE — 97035 APP MDLTY 1+ULTRASOUND EA 15: CPT | Mod: GP | Performed by: PHYSICAL THERAPIST

## 2017-07-27 PROCEDURE — 97140 MANUAL THERAPY 1/> REGIONS: CPT | Mod: GP | Performed by: PHYSICAL THERAPIST

## 2017-07-27 PROCEDURE — 97110 THERAPEUTIC EXERCISES: CPT | Mod: GP | Performed by: PHYSICAL THERAPIST

## 2017-08-07 ENCOUNTER — THERAPY VISIT (OUTPATIENT)
Dept: PHYSICAL THERAPY | Facility: CLINIC | Age: 52
End: 2017-08-07
Payer: COMMERCIAL

## 2017-08-07 DIAGNOSIS — M25.60 JOINT STIFFNESS OF SPINE: ICD-10-CM

## 2017-08-07 DIAGNOSIS — M53.3 PAIN IN THE COCCYX: ICD-10-CM

## 2017-08-07 DIAGNOSIS — G89.29 CHRONIC MIDLINE LOW BACK PAIN WITH BILATERAL SCIATICA: ICD-10-CM

## 2017-08-07 DIAGNOSIS — K21.9 GASTROESOPHAGEAL REFLUX DISEASE WITHOUT ESOPHAGITIS: ICD-10-CM

## 2017-08-07 DIAGNOSIS — M54.42 CHRONIC MIDLINE LOW BACK PAIN WITH BILATERAL SCIATICA: ICD-10-CM

## 2017-08-07 DIAGNOSIS — M54.41 CHRONIC MIDLINE LOW BACK PAIN WITH BILATERAL SCIATICA: ICD-10-CM

## 2017-08-07 PROCEDURE — 97110 THERAPEUTIC EXERCISES: CPT | Mod: GP | Performed by: PHYSICAL THERAPIST

## 2017-08-07 PROCEDURE — 97140 MANUAL THERAPY 1/> REGIONS: CPT | Mod: GP | Performed by: PHYSICAL THERAPIST

## 2017-08-07 PROCEDURE — 97035 APP MDLTY 1+ULTRASOUND EA 15: CPT | Mod: GP | Performed by: PHYSICAL THERAPIST

## 2017-08-07 RX ORDER — PANTOPRAZOLE SODIUM 40 MG/1
TABLET, DELAYED RELEASE ORAL
Qty: 30 TABLET | Refills: 0 | Status: SHIPPED | OUTPATIENT
Start: 2017-08-07 | End: 2017-09-11

## 2017-08-07 NOTE — TELEPHONE ENCOUNTER
Pending Prescriptions:                       Disp   Refills    pantoprazole (PROTONIX) 40 MG EC tablet [*30 tab*0            Sig: TAKE ONE TABLET BY MOUTH EVERY DAY    LES please review:    Pt last refill was 8-3-2016  Last appt was 12-22/no appt scheduled as of now  Please fax, deny or send to AASHISH Victor  [phone] (home) [phone] (work)

## 2017-08-22 ENCOUNTER — THERAPY VISIT (OUTPATIENT)
Dept: PHYSICAL THERAPY | Facility: CLINIC | Age: 52
End: 2017-08-22
Payer: COMMERCIAL

## 2017-08-22 DIAGNOSIS — M25.60 JOINT STIFFNESS OF SPINE: ICD-10-CM

## 2017-08-22 DIAGNOSIS — G89.29 CHRONIC MIDLINE LOW BACK PAIN WITH BILATERAL SCIATICA: ICD-10-CM

## 2017-08-22 DIAGNOSIS — M54.41 CHRONIC MIDLINE LOW BACK PAIN WITH BILATERAL SCIATICA: ICD-10-CM

## 2017-08-22 DIAGNOSIS — M54.42 CHRONIC MIDLINE LOW BACK PAIN WITH BILATERAL SCIATICA: ICD-10-CM

## 2017-08-22 DIAGNOSIS — M53.3 PAIN IN THE COCCYX: ICD-10-CM

## 2017-08-22 PROCEDURE — 97035 APP MDLTY 1+ULTRASOUND EA 15: CPT | Mod: GP | Performed by: PHYSICAL THERAPIST

## 2017-08-22 PROCEDURE — 97140 MANUAL THERAPY 1/> REGIONS: CPT | Mod: GP | Performed by: PHYSICAL THERAPIST

## 2017-08-22 PROCEDURE — 97110 THERAPEUTIC EXERCISES: CPT | Mod: GP | Performed by: PHYSICAL THERAPIST

## 2017-08-22 NOTE — LETTER
[facility]  [address]  [place] [address]  [phone]    2017    Re: Selene Lyn   :   1965  MRN:  [id number]   REFERRING PHYSICIAN:   Yohannes PEREZ PT    Date of Initial Evaluation:  2016  Visits:  Rxs Used: 13  Reason for Referral:     Pain in the coccyx  Chronic midline low back pain with bilateral sciatica  Joint stiffness of spine    PROGRESS  REPORT  Progress reporting period is from 17.       SUBJECTIVE  Subjective changes noted by patient:  .  Subjective: Some very mild achiness in B lower gluteals when went to bed last night. Not sure why. Varies in intensity. Nothing specifically that aggravates. Pain 2-3/10 at worse in both coccyx and B ischial tuberosities.Wednesday's still the worst with sitting at work, even on cushion.     Current pain level is  Current Pain level: 2/10.     Previous pain level was   Initial Pain level: 9/10 (tailbone).   Changes in function:  Yes (See Goal flowsheet attached for changes in current functional level)  Adverse reaction to treatment or activity: activity - sitting prolonged    OBJECTIVE  Changes noted in objective findings:    Objective: Decreasing frequency of appts to once every 3-4 weeks. WNL Lumbar ROM except slight loss extension. Coccyx tender to internal palpation and in slight flexion but ~ 75% better since IE. LA /puborectalis muscles ~90% improvement in tenderness.      ASSESSMENT/PLAN  Updated problem list and treatment plan: Diagnosis 1:  Coccyx/buttock pain  Pain -  hot/cold therapy, US, manual therapy, self management, education, directional preference exercise and home program  Decreased joint mobility - manual therapy and therapeutic exercise  Decreased strength - therapeutic exercise and therapeutic activities  Decreased proprioception - neuro re-education and therapeutic activities  Impaired muscle performance - neuro re-education  Decreased function - therapeutic  activities  Impaired posture - neuro re-education  STG/LTGs have been met or progress has been made towards goals:  Yes (See Goal flow sheet completed today.)  Assessment of Progress: The patient's condition is improving.  Self Management Plans:  Patient has been instructed in a home treatment program.  Patient  has been instructed in self management of symptoms.  Selene continues to require the following intervention to meet STG and LTG's:  PT      Re: Selene Lyn   :   1965    Recommendations:  This patient would benefit from continued therapy.     Frequency:  1 X a month, once daily  Duration:  for 2 months    Thank you for your referral.    INQUIRIES  Therapist: Nicki Kapadia, MS, PT, OCS  [facility]  [id number] [place] [address]  Phone: [phone]  Fax: [phone]

## 2017-08-22 NOTE — MR AVS SNAPSHOT
After Visit Summary   8/22/2017    Selene Lyn    MRN: [id number]           Patient Information     Date Of Birth          1965        Visit Information        Provider Department      8/22/2017 4:15 PM Nicki Kapadia PT [facility]        Today's Diagnoses     Pain in the coccyx        Chronic midline low back pain with bilateral sciatica        Joint stiffness of spine           Follow-ups after your visit        Your next 10 appointments already scheduled     Sep 11, 2017  8:00 AM CDT   Physical-Complete with Vonda Zaidi MD   [facility] ([facility])    [address]  [address]  [place] [address]   [phone]           Instruct patient that they should have nothing(except water) after midnight the evening prior to the appointment.            Sep 19, 2017  4:15 PM CDT   [facility] ([facility]  )    [address]  [address]  [place] [address]   [phone]            Oct 10, 2017  7:45 AM CDT   [facility] ([facility] Perez  )    [address]  [address]  [place] [address]   [phone]              Who to contact     If you have questions or need follow up information about today's clinic visit or your schedule please contact [facility] directly at [phone].  Normal or non-critical lab and imaging results will be communicated to you by MyChart, letter or phone within 4 business days after the clinic has received the results. If you do not hear from us within 7 days, please contact the clinic through MyChart or phone. If you have a critical or abnormal lab result, we will notify you by phone as soon as possible.  Submit refill requests through [employer] or call your pharmacy and they will forward the refill request to us. Please allow 3 business days for your refill to be completed.  "         Additional Information About Your Visit        MyChart Information     [employer] lets you send messages to your doctor, view your test results, renew your prescriptions, schedule appointments and more. To sign up, go to [email or web address] . Click on \"Log in\" on the left side of the screen, which will take you to the Welcome page. Then click on \"Sign up Now\" on the right side of the page.     You will be asked to enter the access code listed below, as well as some personal information. Please follow the directions to create your username and password.     Your access code is: [email or web address]  Expires: 2017  8:21 AM     Your access code will  in 90 days. If you need help or a new code, please call your [place] clinic or [phone].        Care EveryWhere ID     This is your Care EveryWhere ID. This could be used by other organizations to access your [place] medical records  [id number]         Blood Pressure from Last 3 Encounters:   16 98/62   10/11/16 110/70   16 104/60    Weight from Last 3 Encounters:   16 57.2 kg (126 lb 3.2 oz)   10/11/16 57.2 kg (126 lb)   16 56.7 kg (125 lb 1.6 oz)              We Performed the Following     [facility] PROGRESS NOTES REPORT     MANUAL THER TECH,1+REGIONS,EA 15 MIN     THERAPEUTIC EXERCISES     ULTRASOUND THERAPY        Primary Care Provider Office Phone # Fax #    Vonda Zaidi [phone]       [address] [address]        Equal Access to Services     [facility] : Hadii aad ku hadasho Soomaali, waaxda luqadaha, qaybta kaalmada adan, ger lynn. So [facility] [phone].    ATENCIÓN: Si habla español, tiene a harrison disposición servicios gratuitos de asistencia lingüística. Llame al [phone].    We comply with applicable federal civil rights laws and [place] laws. We do not discriminate on the basis of race, color, national origin, age, disability sex, " sexual orientation or gender identity.            Thank you!     Thank you for choosing [facility]  for your care. Our goal is always to provide you with excellent care. Hearing back from our patients is one way we can continue to improve our services. Please take a few minutes to complete the written survey that you may receive in the mail after your visit with us. Thank you!             Your Updated Medication List - Protect others around you: Learn how to safely use, store and throw away your medicines at www.disposemymeds.org.          This list is accurate as of: 8/22/17 11:59 PM.  Always use your most recent med list.                   Brand Name Dispense Instructions for use Diagnosis    albuterol 108 (90 BASE) MCG/ACT Inhaler    albuterol    1 Inhaler    Inhale 2 puffs into the lungs every 6 hours as needed for shortness of breath / dyspnea    Mild intermittent asthma without complication       ALEVE PO           fluticasone 110 MCG/ACT Inhaler    FLOVENT HFA    3 Inhaler    Inhale 1 puff into the lungs 2 times daily During allergy season as needed    Mild intermittent asthma without complication       montelukast 10 MG tablet    SINGULAIR    30 tablet    Take 1 tablet (10 mg) by mouth At Bedtime    Cough       MULTIVITAMIN PO      Take  by mouth.    Routine gynecological examination, Diffuse cystic mastopathy       pantoprazole 40 MG EC tablet    PROTONIX    30 tablet    TAKE ONE TABLET BY MOUTH EVERY DAY    Gastroesophageal reflux disease without esophagitis       VITAMIN D3       Diffuse cystic mastopathy       VITAMIN E NATURAL PO

## 2017-08-23 NOTE — PROGRESS NOTES
Subjective:    HPI                    Objective:    System    Physical Exam    General     ROS    Assessment/Plan:      PROGRESS  REPORT    Progress reporting period is from 08/23/17.       SUBJECTIVE  Subjective changes noted by patient:  .  Subjective: Some very mild achiness in B lower gluteals when went to bed last night. Not sure why. Varies in intensity. Nothing specifically that aggravates. Pain 2-3/10 at worse in both coccyx and B ischial tuberosities.Wednesday's still the worst with sitting at work, even on cushion.     Current pain level is  Current Pain level: 2/10.     Previous pain level was   Initial Pain level: 9/10 (tailbone).   Changes in function:  Yes (See Goal flowsheet attached for changes in current functional level)  Adverse reaction to treatment or activity: activity - sitting prolonged    OBJECTIVE  Changes noted in objective findings:    Objective: Decreasing frequency of appts to once every 3-4 weeks. WNL Lumbar ROM except slight loss extension. Coccyx tender to internal palpation and in slight flexion but ~ 75% better since IE. LA /puborectalis muscles ~90% improvement in tenderness.      ASSESSMENT/PLAN  Updated problem list and treatment plan: Diagnosis 1:  Coccyx/buttock pain  Pain -  hot/cold therapy, US, manual therapy, self management, education, directional preference exercise and home program  Decreased joint mobility - manual therapy and therapeutic exercise  Decreased strength - therapeutic exercise and therapeutic activities  Decreased proprioception - neuro re-education and therapeutic activities  Impaired muscle performance - neuro re-education  Decreased function - therapeutic activities  Impaired posture - neuro re-education  STG/LTGs have been met or progress has been made towards goals:  Yes (See Goal flow sheet completed today.)  Assessment of Progress: The patient's condition is improving.  Self Management Plans:  Patient has been instructed in a home treatment  program.  Patient  has been instructed in self management of symptoms.    Selene continues to require the following intervention to meet STG and LTG's:  PT    Recommendations:  This patient would benefit from continued therapy.     Frequency:  1 X a month, once daily  Duration:  for 2 months          Please refer to the daily flowsheet for treatment today, total treatment time and time spent performing 1:1 timed codes.

## 2017-09-11 ENCOUNTER — OFFICE VISIT (OUTPATIENT)
Dept: FAMILY MEDICINE | Facility: CLINIC | Age: 52
End: 2017-09-11

## 2017-09-11 VITALS
WEIGHT: 127.4 LBS | HEART RATE: 68 BPM | DIASTOLIC BLOOD PRESSURE: 62 MMHG | HEIGHT: 62 IN | TEMPERATURE: 97.6 F | RESPIRATION RATE: 20 BRPM | SYSTOLIC BLOOD PRESSURE: 102 MMHG | BODY MASS INDEX: 23.45 KG/M2

## 2017-09-11 DIAGNOSIS — J45.20 MILD INTERMITTENT ASTHMA WITHOUT COMPLICATION: ICD-10-CM

## 2017-09-11 DIAGNOSIS — J30.1 ACUTE SEASONAL ALLERGIC RHINITIS DUE TO POLLEN: ICD-10-CM

## 2017-09-11 DIAGNOSIS — K21.9 GASTROESOPHAGEAL REFLUX DISEASE WITHOUT ESOPHAGITIS: ICD-10-CM

## 2017-09-11 DIAGNOSIS — Z13.220 SCREENING CHOLESTEROL LEVEL: ICD-10-CM

## 2017-09-11 DIAGNOSIS — Z13.1 SCREENING FOR DIABETES MELLITUS: ICD-10-CM

## 2017-09-11 DIAGNOSIS — Z01.411 ENCOUNTER FOR GYNECOLOGICAL EXAMINATION WITH ABNORMAL FINDING: Primary | ICD-10-CM

## 2017-09-11 DIAGNOSIS — Z23 NEED FOR VACCINATION: ICD-10-CM

## 2017-09-11 LAB
% GRANULOCYTES: 58.3 %
GLUCOSE SERPL-MCNC: 89 MG/DL (ref 60–99)
HCT VFR BLD AUTO: 43.9 % (ref 35–47)
HEMOGLOBIN: 13.9 G/DL (ref 11.7–15.7)
LYMPHOCYTES NFR BLD AUTO: 33.7 %
MCH RBC QN AUTO: 28.5 PG (ref 26–33)
MCHC RBC AUTO-ENTMCNC: 31.7 G/DL (ref 31–36)
MCV RBC AUTO: 90.2 FL (ref 78–100)
MONOCYTES NFR BLD AUTO: 8 %
PLATELET COUNT - QUEST: 208 10^9/L (ref 150–375)
RBC # BLD AUTO: 4.87 10*12/L (ref 3.8–5.2)
WBC # BLD AUTO: 5.3 10*9/L (ref 4–11)

## 2017-09-11 PROCEDURE — 90471 IMMUNIZATION ADMIN: CPT | Performed by: FAMILY MEDICINE

## 2017-09-11 PROCEDURE — 36415 COLL VENOUS BLD VENIPUNCTURE: CPT | Performed by: FAMILY MEDICINE

## 2017-09-11 PROCEDURE — 90715 TDAP VACCINE 7 YRS/> IM: CPT | Performed by: FAMILY MEDICINE

## 2017-09-11 PROCEDURE — 82947 ASSAY GLUCOSE BLOOD QUANT: CPT | Performed by: FAMILY MEDICINE

## 2017-09-11 PROCEDURE — 80061 LIPID PANEL: CPT | Mod: 90 | Performed by: FAMILY MEDICINE

## 2017-09-11 PROCEDURE — 99396 PREV VISIT EST AGE 40-64: CPT | Mod: 25 | Performed by: FAMILY MEDICINE

## 2017-09-11 PROCEDURE — 85025 COMPLETE CBC W/AUTO DIFF WBC: CPT | Performed by: FAMILY MEDICINE

## 2017-09-11 RX ORDER — ALBUTEROL SULFATE 90 UG/1
2 AEROSOL, METERED RESPIRATORY (INHALATION) EVERY 6 HOURS PRN
Qty: 1 INHALER | Refills: 3 | Status: SHIPPED | OUTPATIENT
Start: 2017-09-11 | End: 2018-05-15

## 2017-09-11 RX ORDER — PANTOPRAZOLE SODIUM 40 MG/1
40 TABLET, DELAYED RELEASE ORAL DAILY
Qty: 90 TABLET | Refills: 3 | Status: SHIPPED | OUTPATIENT
Start: 2017-09-11 | End: 2018-10-15

## 2017-09-11 RX ORDER — MONTELUKAST SODIUM 10 MG/1
10 TABLET ORAL AT BEDTIME
Qty: 30 TABLET | Refills: 2 | Status: SHIPPED | OUTPATIENT
Start: 2017-09-11 | End: 2018-05-15

## 2017-09-11 RX ORDER — FLUTICASONE PROPIONATE 110 UG/1
1 AEROSOL, METERED RESPIRATORY (INHALATION) 2 TIMES DAILY
Qty: 3 INHALER | Refills: 0 | Status: SHIPPED | OUTPATIENT
Start: 2017-09-11 | End: 2018-05-15

## 2017-09-11 NOTE — MR AVS SNAPSHOT
After Visit Summary   9/11/2017    Selene Lyn    MRN: [id number]           Patient Information     Date Of Birth          1965        Visit Information        Provider Department      9/11/2017 8:00 AM Vonda Zaidi MD [facility]        Today's Diagnoses     Encounter for gynecological examination with abnormal finding    -  1    Mild intermittent asthma without complication        Gastroesophageal reflux disease without esophagitis        Acute seasonal allergic rhinitis due to pollen        Screening for diabetes mellitus        Screening cholesterol level        Need for vaccination          Care Instructions    Total calcium intake of 1500 mgm/day, vitamin D 400-800IU/day and a regular weight bearing exercise program for prevention of osteoporosis is recommended treatment at this time.    Plantar fascitis stretches daily    Follow asthma action plan/get flu shot          Follow-ups after your visit        Your next 10 appointments already scheduled     Sep 19, 2017  4:15 PM CDT   KARIME Spine with BRAEDEN Cao PT ([facility]  )    [address] [address]  [address] [place] [address]   [phone]            Oct 10, 2017  7:45 AM CDT   KARIME Spine with Nicki Kapadia PT   KARIME PEREZ PT ([facility]  )    [address] [place] [address]  [address] [place] [address]   [phone]              Who to contact     If you have questions or need follow up information about today's clinic visit or your schedule please contact [facility] directly at [phone].  Normal or non-critical lab and imaging results will be communicated to you by MyChart, letter or phone within 4 business days after the clinic has received the results. If you do not hear from us within 7 days, please contact the clinic through MyChart or phone. If you have a critical or abnormal lab result, we will notify you by phone as soon as  "possible.  Submit refill requests through [email or web address] or call your pharmacy and they will forward the refill request to us. Please allow 3 business days for your refill to be completed.          Additional Information About Your Visit        [employer] Information     [email or web address] lets you send messages to your doctor, view your test results, renew your prescriptions, schedule appointments and more. To sign up, go to [email or web address] . Click on \"Log in\" on the left side of the screen, which will take you to the Welcome page. Then click on \"Sign up Now\" on the right side of the page.     You will be asked to enter the access code listed below, as well as some personal information. Please follow the directions to create your username and password.     Your access code is: [email or web address]  Expires: 12/10/2017  8:57 AM     Your access code will  in 90 days. If you need help or a new code, please call your [facility] clinic or [phone].        Care EveryWhere ID     This is your Care EveryWhere ID. This could be used by other organizations to access your [facility] medical records  [id number]        Your Vitals Were     Pulse Temperature Respirations Height Last Period Breastfeeding?    68 97.6  F (36.4  C) (Oral) 20 1.562 m (5' 1.5\") (LMP Unknown) No    BMI (Body Mass Index)                   23.68 kg/m2            Blood Pressure from Last 3 Encounters:   17 102/62   16 98/62   10/11/16 110/70    Weight from Last 3 Encounters:   17 57.8 kg (127 lb 6.4 oz)   16 57.2 kg (126 lb 3.2 oz)   10/11/16 57.2 kg (126 lb)              We Performed the Following     Asthma Action Plan (AAP)     CL AFF HEMOGRAM/PLATE/DIFF (BFP)     Glucose Fasting (BFP)     Lipid Profile (QUEST)     TDAP VACCINE (BOOSTRIX)     VACCINE ADMINISTRATION, INITIAL     VENOUS COLLECTION          Today's Medication Changes          These changes are accurate as of: 17  8:57 AM.  If you have any questions, ask your nurse or " doctor.               These medicines have changed or have updated prescriptions.        Dose/Directions    pantoprazole 40 MG EC tablet   Commonly known as:  PROTONIX   This may have changed:  See the new instructions.   Used for:  Gastroesophageal reflux disease without esophagitis   Changed by:  Vonda Zaidi MD        Dose:  40 mg   Take 1 tablet (40 mg) by mouth daily   Quantity:  90 tablet   Refills:  3            Where to get your medicines      These medications were sent to [place], [place] - [address]  [address] [facility] [address]     Phone:  [phone]     albuterol 108 (90 BASE) MCG/ACT Inhaler    fluticasone 110 MCG/ACT Inhaler    montelukast 10 MG tablet    pantoprazole 40 MG EC tablet                Primary Care Provider Office Phone # Fax #    Vonda Zaidi [phone] [address]  [facility] [address]        Equal Access to Services     [facility]: Hadii trinidad ku hadasho Soomaali, waaxda luqadaha, qaybta kaalmada adeegyada, waxay idiin hayaan carlos a neri . So [facility] [phone].    ATENCIÓN: Si habla español, tiene a harrison disposición servicios gratuitos de asistencia lingüística. Llame al [phone].    We comply with applicable federal civil rights laws and [place] laws. We do not discriminate on the basis of race, color, national origin, age, disability sex, sexual orientation or gender identity.            Thank you!     Thank you for choosing [facility]  for your care. Our goal is always to provide you with excellent care. Hearing back from our patients is one way we can continue to improve our services. Please take a few minutes to complete the written survey that you may receive in the mail after your visit with us. Thank you!             Your Updated Medication List - Protect others around you: Learn how to safely use, store and throw away your medicines at  www.disposemymeds.org.          This list is accurate as of: 9/11/17  8:57 AM.  Always use your most recent med list.                   Brand Name Dispense Instructions for use Diagnosis    albuterol 108 (90 BASE) MCG/ACT Inhaler    PROAIR HFA    1 Inhaler    Inhale 2 puffs into the lungs every 6 hours as needed for shortness of breath / dyspnea    Mild intermittent asthma without complication       ALEVE PO           fluticasone 110 MCG/ACT Inhaler    FLOVENT HFA    3 Inhaler    Inhale 1 puff into the lungs 2 times daily During allergy season as needed    Mild intermittent asthma without complication, Acute seasonal allergic rhinitis due to pollen       montelukast 10 MG tablet    SINGULAIR    30 tablet    Take 1 tablet (10 mg) by mouth At Bedtime    Mild intermittent asthma without complication, Acute seasonal allergic rhinitis due to pollen       MULTIVITAMIN PO      Take  by mouth.    Routine gynecological examination, Diffuse cystic mastopathy       pantoprazole 40 MG EC tablet    PROTONIX    90 tablet    Take 1 tablet (40 mg) by mouth daily    Gastroesophageal reflux disease without esophagitis       VITAMIN D3       Diffuse cystic mastopathy       VITAMIN E NATURAL PO

## 2017-09-11 NOTE — PATIENT INSTRUCTIONS
Total calcium intake of 1500 mgm/day, vitamin D 400-800IU/day and a regular weight bearing exercise program for prevention of osteoporosis is recommended treatment at this time.    Plantar fascitis stretches daily    Follow asthma action plan/get flu shot

## 2017-09-11 NOTE — LETTER
September 13, 2017      Selene Lyn  [address] [facility] [address]        Dear ,    We are writing to inform you of your test results.    Your test results fall within the expected range(s). Please continue with current treatment plan.    Resulted Orders   CL AFF HEMOGRAM/PLATE/DIFF (BFP)   Result Value Ref Range    WBC 5.3 4.0 - 11 10*9/L    RBC Count 4.87 3.8 - 5.2 10*12/L    Hemoglobin 13.9 11.7 - 15.7 g/dL    Hematocrit 43.9 35.0 - 47.0 %    MCV 90.2 78 - 100 fL    MCH 28.5 26 - 33 pg    MCHC 31.7 31 - 36 g/dL    Platelet Count 208 150 - 375 10^9/L    % Granulocytes 58.3 %    % Lymphocytes 33.7 %    % Monocytes 8.0 %   Glucose Fasting (BFP)   Result Value Ref Range    Glucose 89 60 - 99 mg/dL   Lipid Profile (QUEST)   Result Value Ref Range    Cholesterol 232 (H) <200 mg/dL    HDL Cholesterol 94 >50 mg/dL    Triglycerides 80 <150 mg/dL    LDL Cholesterol Calculated 121 (H) mg/dL (calc)      Comment:      Reference range: <100     Desirable range <100 mg/dL for patients with CHD or  diabetes and <70 mg/dL for diabetic patients with  known heart disease.     The CodyPowell calculation is a validated novel   method that provides better accuracy than the   Friedewald equation in the estimation of LDL-C,   particularly when TG levels are 150-400 mg/dL and   LDL-C levels are lower than 70 mg/dL.  Reference:  Masood BORDEN et al. Comparison of a Novel   Method vs the Friedewald Equation for Estimating   Low-Density Lipoprotein Cholesterol Levels From the   Standard Lipid Profile. LLUVIA. 2013;310(19): 3662-5032.     For additional information, please refer to  http://education.Global Grind/faq/KGH262  (This link is being provided for informational/  educational purposes only.)      Cholesterol/HDL Ratio 2.5 <5.0 (calc)    Non HDL Cholesterol 138 (H) <130 mg/dL (calc)      Comment:      For patients with diabetes plus 1 major ASCVD risk   factor, treating to a non-HDL-C goal of <100 mg/dL    (LDL-C of <70 mg/dL) is considered a therapeutic   option.         If you have any questions or concerns, please call the clinic at the number listed above.       Sincerely,        Vonda Zaidi MD

## 2017-09-11 NOTE — PROGRESS NOTES
SUBJECTIVE:  Selene Lyn is an 52 year old  postmenopausal woman   who presents for annual gyn exam. Menopause at age 50. No   bleeding, spotting, or discharge noted. Recently evaluated with Dr Falcon and working on low back and coccyx pain with PT    Estrogen replacement therapy: never  MED exposure: no  History of abnormal Pap smear: No  Family history of uterine or ovarian cancer: No  Regular self breast exam: Yes  History of abnormal mammogram: No  Family history of breast cancer: No  History of abnormal lipids: No    Past Medical History:  No date: Diffuse cystic mastopathy  No date: Mild intermittent asthma     Comment: alleries triggers include smells and seasonal               allergies  : Tachycardia, unspecified     Comment: ablated for re-entrant tachycardia      Family History    Breast Cancer No family hx of     Comment: cystic mastopathy    Cancer - colorectal No family hx of     C.A.D. No family hx of     Eye Disorder No family hx of     Depression No family hx of     DIABETES Maternal Grandmother     CANCER Mother     Comment: lung cancer   69    DIABETES Sister 40    Comment: obesity       Past Surgical History:  No date: C NONSPECIFIC PROCEDURE     Comment: ablation of rentrant tachy loop    Current Outpatient Prescriptions:  pantoprazole (PROTONIX) 40 MG EC tablet   Naproxen Sodium (ALEVE PO)   VITAMIN E NATURAL PO   fluticasone (FLOVENT HFA) 110 MCG/ACT inhaler   albuterol (ALBUTEROL) 108 (90 BASE) MCG/ACT inhaler   montelukast (SINGULAIR) 10 MG tablet   Multiple Vitamin (MULTIVITAMIN OR)   Cholecalciferol (VITAMIN D3)   No current facility-administered medications for this visit.    -- Erythrocin   -- Penicillins   -- Sulfa Drugs        Smoking status: Never Smoker    Smokeless tobacco: Never Used    Comment: exposed as a child but never smoked    Alcohol use Yes  0.0 oz/week    0 drink(s) per week         Comment: one drink every 3 months/        Review Of Systems  Ears/Nose/Throat:  "negative, spring allergies triggering asthma  Respiratory: asthma well controlled with medications  Cardiovascular: negative  Gastrointestinal: GERD controlled by Protonix daily  Genitourinary: negative    OBJECTIVE:  /62 (BP Location: Left arm, Patient Position: Chair, Cuff Size: Adult Regular)  Pulse 68  Temp 97.6  F (36.4  C) (Oral)  Resp 20  Ht 1.562 m (5' 1.5\")  Wt 57.8 kg (127 lb 6.4 oz)  LMP  (LMP Unknown)  Breastfeeding? No  BMI 23.68 kg/m2  General appearance: healthy, alert, no distress, cooperative and smiling  Skin: Skin color, texture, turgor normal. No rashes or lesions.  Ears: negative  Nose/Sinuses: Nares normal. Septum midline. Mucosa normal. No drainage or sinus tenderness.  Oropharynx: Lips, mucosa, and tongue normal. Teeth and gums normal.  Neck: Neck supple. No adenopathy. Thyroid symmetric, normal size,, Carotids without bruits.  Lungs: negative, Percussion normal. Good diaphragmatic excursion. Lungs clear  Heart: negative, PMI normal. No lifts, heaves, or thrills. RRR. No murmurs, clicks gallops or rub  Breasts: Inspection negative. No nipple discharge or bleeding. No masses.  Abdomen: Abdomen soft, non-tender. BS normal. No masses, organomegaly  Pelvic: External genitalia and vagina normal. Bimanual and rectovaginal normal., positive findings:  vaginal mucosa atrophy  BMI : Body mass index is 23.68 kg/(m^2).    ASSESSMENT:(Z01.411) Encounter for gynecological examination with abnormal finding  (primary encounter diagnosis)  Plan: CL AFF HEMOGRAM/PLATE/DIFF (BFP), VENOUS         COLLECTION        Total calcium intake of 1500 mgm/day, vitamin D 400-800IU/day and a regular weight bearing exercise program for prevention of osteoporosis is recommended treatment at this time.    Refilled Protonix/ Potential medication side effects were discussed with the patient; let me know if any occur.    (J45.20) Mild intermittent asthma without complication  Comment: follow AAP  Plan: montelukast " (SINGULAIR) 10 MG tablet,         fluticasone (FLOVENT HFA) 110 MCG/ACT Inhaler,         albuterol (PROAIR HFA) 108 (90 BASE) MCG/ACT         Inhaler, Asthma Action Plan (AAP)        Refilled/ get flu shot    (J30.1) Acute seasonal allergic rhinitis due to pollen  Plan: montelukast (SINGULAIR) 10 MG tablet,         fluticasone (FLOVENT HFA) 110 MCG/ACT Inhaler,         Asthma Action Plan (AAP)        I have reviewed the patient's medical history in detail and updated the computerized patient record.      (Z13.1) Screening for diabetes mellitus  Plan: Glucose Fasting (BFP), VENOUS COLLECTION            (Z13.220) Screening cholesterol level  Plan: Lipid Profile (QUEST), VENOUS COLLECTION            (Z23) Need for vaccination  Plan: TDAP VACCINE (BOOSTRIX), VACCINE         ADMINISTRATION, INITIAL                Dx:  1)  Pap smear, mammogram  2)  Lipids at appropriate intervals    PE:  Reviewed health maintenance including diet, regular exercise,   estrogen replacement and periodic exams.

## 2017-09-11 NOTE — NURSING NOTE
Selene Lyn is here for a CPX.    Pre-visit planning  Immunizations -up to date  Colonoscopy -is up to date  Mammogram -is up to date  Asthma test --is up to date, completed today      Questioned patient about current smoking habits.  Pt. has never smoked.  Body mass index is 23.68 kg/(m^2).  PULSE regular  My Chart:   CLASSIFICATION OF OVERWEIGHT AND OBESITY BY BMI                        Obesity Class           BMI(kg/m2)  Underweight                                    < 18.5  Normal                                         18.5-24.9  Overweight                                     25.0-29.9  OBESITY                     I                  30.0-34.9                             II                 35.0-39.9  EXTREME OBESITY             III                >40                            Patient's  BMI Body mass index is 23.68 kg/(m^2).  Http://hin.nhlbi.nih.gov/menuplanner/menu.cgi  ETOH screening:  Questions:  1-How often do you have a drink containing alcohol?                        2-     3 times per month(s)  2-How many drinks containing alcohol do you have on a typical day when you are         Drinking?                              1   3- How often do you have 5 or more drinks on one occasion?                              never per     Have you ever:  None of the patient's responses to the CAGE screening were positive / Negative CAGE score

## 2017-09-12 LAB
CHOLEST SERPL-MCNC: 232 MG/DL
CHOLEST/HDLC SERPL: 2.5 (CALC)
HDLC SERPL-MCNC: 94 MG/DL
LDLC SERPL CALC-MCNC: 121 MG/DL (CALC)
NONHDLC SERPL-MCNC: 138 MG/DL (CALC)
TRIGL SERPL-MCNC: 80 MG/DL

## 2017-09-12 ASSESSMENT — ASTHMA QUESTIONNAIRES: ACT_TOTALSCORE: 25

## 2017-09-21 ENCOUNTER — THERAPY VISIT (OUTPATIENT)
Dept: PHYSICAL THERAPY | Facility: CLINIC | Age: 52
End: 2017-09-21
Payer: COMMERCIAL

## 2017-09-21 DIAGNOSIS — M54.42 CHRONIC MIDLINE LOW BACK PAIN WITH BILATERAL SCIATICA: ICD-10-CM

## 2017-09-21 DIAGNOSIS — M53.3 PAIN IN THE COCCYX: ICD-10-CM

## 2017-09-21 DIAGNOSIS — M25.60 JOINT STIFFNESS OF SPINE: ICD-10-CM

## 2017-09-21 DIAGNOSIS — G89.29 CHRONIC MIDLINE LOW BACK PAIN WITH BILATERAL SCIATICA: ICD-10-CM

## 2017-09-21 DIAGNOSIS — M54.41 CHRONIC MIDLINE LOW BACK PAIN WITH BILATERAL SCIATICA: ICD-10-CM

## 2017-09-21 PROCEDURE — 97530 THERAPEUTIC ACTIVITIES: CPT | Mod: GP | Performed by: PHYSICAL THERAPIST

## 2017-09-21 PROCEDURE — 97140 MANUAL THERAPY 1/> REGIONS: CPT | Mod: GP | Performed by: PHYSICAL THERAPIST

## 2017-09-24 ENCOUNTER — OFFICE VISIT (OUTPATIENT)
Dept: URGENT CARE | Facility: URGENT CARE | Age: 52
End: 2017-09-24
Payer: COMMERCIAL

## 2017-09-24 VITALS
OXYGEN SATURATION: 99 % | DIASTOLIC BLOOD PRESSURE: 62 MMHG | BODY MASS INDEX: 23.34 KG/M2 | SYSTOLIC BLOOD PRESSURE: 99 MMHG | WEIGHT: 125.56 LBS | HEART RATE: 87 BPM | TEMPERATURE: 97.7 F

## 2017-09-24 DIAGNOSIS — J06.9 VIRAL URI: ICD-10-CM

## 2017-09-24 DIAGNOSIS — R07.0 THROAT PAIN: Primary | ICD-10-CM

## 2017-09-24 LAB
DEPRECATED S PYO AG THROAT QL EIA: NORMAL
SPECIMEN SOURCE: NORMAL

## 2017-09-24 PROCEDURE — 99213 OFFICE O/P EST LOW 20 MIN: CPT | Performed by: PHYSICIAN ASSISTANT

## 2017-09-24 PROCEDURE — 87081 CULTURE SCREEN ONLY: CPT | Performed by: PHYSICIAN ASSISTANT

## 2017-09-24 PROCEDURE — 87880 STREP A ASSAY W/OPTIC: CPT | Performed by: PHYSICIAN ASSISTANT

## 2017-09-24 NOTE — MR AVS SNAPSHOT
"              After Visit Summary   9/24/2017    Selene Lyn    MRN: [id number]           Patient Information     Date Of Birth          1965        Visit Information        Provider Department      9/24/2017 10:10 AM Adelina Estrada PA-C [facility]        Today's Diagnoses     Throat pain    -  1    Viral URI           Follow-ups after your visit        Your next 10 appointments already scheduled     Oct 10, 2017  7:45 AM CDT   KARIME Spine with Nicki Kapadia, PT   [facility]    [address] [facility]  [address]  [facility] [address]   [phone]              Who to contact     If you have questions or need follow up information about today's clinic visit or your schedule please contact [facility] directly at [phone].  Normal or non-critical lab and imaging results will be communicated to you by [employer]art, letter or phone within 4 business days after the clinic has received the results. If you do not hear from us within 7 days, please contact the clinic through [employer] or phone. If you have a critical or abnormal lab result, we will notify you by phone as soon as possible.  Submit refill requests through INCHRON or call your pharmacy and they will forward the refill request to us. Please allow 3 business days for your refill to be completed.          Additional Information About Your Visit        MyChart Information     INCHRON lets you send messages to your doctor, view your test results, renew your prescriptions, schedule appointments and more. To sign up, go to [email or web address] . Click on \"Log in\" on the left side of the screen, which will take you to the Welcome page. Then click on \"Sign up Now\" on the right side of the page.     You will be asked to enter the access code listed below, as well as some personal information. Please follow the directions to create your username and password.     Your access code is: " [email or web address]  Expires: 12/10/2017  8:57 AM     Your access code will  in 90 days. If you need help or a new code, please call your [place] [facility] clinic or [phone].        Care EveryWhere ID     This is your Care EveryWhere ID. This could be used by other organizations to access your [facility] medical records  [id number]        Your Vitals Were     Pulse Temperature Last Period Pulse Oximetry Breastfeeding? BMI (Body Mass Index)    87 97.7  F (36.5  C) (Tympanic) (LMP Unknown) 99% No 23.34 kg/m2       Blood Pressure from Last 3 Encounters:   17 99/62   17 102/62   16 98/62    Weight from Last 3 Encounters:   17 125 lb 9 oz (57 kg)   17 127 lb 6.4 oz (57.8 kg)   16 126 lb 3.2 oz (57.2 kg)              We Performed the Following     Beta strep group A culture     Strep, Rapid Screen        Primary Care Provider Office Phone # Fax #    Vonda Zaidi [phone]       [address] [address]        Equal Access to Services     KEITH STANLEY : Hadii aad ku hadasho Soomaali, waaxda luqadaha, qaybta kaalmada adeegyada, ger neri . So [facility] [phone].    ATENCIÓN: Si habla español, tiene a harrison disposición servicios gratuitos de asistencia lingüística. Llame al [phone].    We comply with applicable federal civil rights laws and [place] laws. We do not discriminate on the basis of race, color, national origin, age, disability sex, sexual orientation or gender identity.            Thank you!     Thank you for choosing [facility]  for your care. Our goal is always to provide you with excellent care. Hearing back from our patients is one way we can continue to improve our services. Please take a few minutes to complete the written survey that you may receive in the mail after your visit with us. Thank you!             Your Updated Medication List - Protect others around you: Learn how to  safely use, store and throw away your medicines at www.disposemymeds.org.          This list is accurate as of: 9/24/17 11:25 AM.  Always use your most recent med list.                   Brand Name Dispense Instructions for use Diagnosis    albuterol 108 (90 BASE) MCG/ACT Inhaler    PROAIR HFA    1 Inhaler    Inhale 2 puffs into the lungs every 6 hours as needed for shortness of breath / dyspnea    Mild intermittent asthma without complication       ALEVE PO           fluticasone 110 MCG/ACT Inhaler    FLOVENT HFA    3 Inhaler    Inhale 1 puff into the lungs 2 times daily During allergy season as needed    Mild intermittent asthma without complication, Acute seasonal allergic rhinitis due to pollen       montelukast 10 MG tablet    SINGULAIR    30 tablet    Take 1 tablet (10 mg) by mouth At Bedtime    Mild intermittent asthma without complication, Acute seasonal allergic rhinitis due to pollen       MULTIVITAMIN PO      Take  by mouth.    Routine gynecological examination, Diffuse cystic mastopathy       pantoprazole 40 MG EC tablet    PROTONIX    90 tablet    Take 1 tablet (40 mg) by mouth daily    Gastroesophageal reflux disease without esophagitis       VITAMIN D3       Diffuse cystic mastopathy       VITAMIN E NATURAL PO

## 2017-09-24 NOTE — PROGRESS NOTES
SUBJECTIVE:  Selene Lyn is a 52 year old female with a chief complaint of sore throat, nasal congestion, ears feel full with pressure.  No fevers.  Feels PND but no sinus pain.  No hx of strep and no exposures .  Onset of symptoms was 2 day(s) ago.  Neck feels swollen.  Still eating and drinking.  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   Course of illness: gradual onset and still present.  Severity moderate  Current and Associated symptoms: none  Treatment measures tried include Fluids, OTC meds and Rest.  Predisposing factors include None.    Past Medical History:   Diagnosis Date     Diffuse cystic mastopathy      Mild intermittent asthma     alleries triggers include smells and seasonal allergies     Tachycardia, unspecified 12/99    ablated for re-entrant tachycardia     Current Outpatient Prescriptions   Medication Sig Dispense Refill     montelukast (SINGULAIR) 10 MG tablet Take 1 tablet (10 mg) by mouth At Bedtime 30 tablet 2     fluticasone (FLOVENT HFA) 110 MCG/ACT Inhaler Inhale 1 puff into the lungs 2 times daily During allergy season as needed 3 Inhaler 0     albuterol (PROAIR HFA) 108 (90 BASE) MCG/ACT Inhaler Inhale 2 puffs into the lungs every 6 hours as needed for shortness of breath / dyspnea 1 Inhaler 3     pantoprazole (PROTONIX) 40 MG EC tablet Take 1 tablet (40 mg) by mouth daily 90 tablet 3     Naproxen Sodium (ALEVE PO)        VITAMIN E NATURAL PO        Multiple Vitamin (MULTIVITAMIN OR) Take  by mouth.       Cholecalciferol (VITAMIN D3)        Social History   Substance Use Topics     Smoking status: Never Smoker     Smokeless tobacco: Never Used      Comment: exposed as a child but never smoked     Alcohol use 0.0 oz/week     0 Standard drinks or equivalent per week      Comment: one drink every 3 months/        ROS:  Review of systems negative except as stated above.    OBJECTIVE:   BP 99/62  Pulse 87  Temp 97.7  F (36.5  C) (Tympanic)  Wt 125 lb 9 oz (57 kg)  LMP  (LMP  Unknown)  SpO2 99%  Breastfeeding? No  BMI 23.34 kg/m2  GENERAL APPEARANCE: healthy, alert and no distress  EYES: EOMI,  PERRL, conjunctiva clear  HENT: TM's normal bilaterally, nose and mouth without erythema, ulcers or lesions, oral mucous membranes moist, no erythema noted and no exudate.  Uvula midline.  Mild PND noted but no sinus pain or pressure  NECK: supple, non-tender to palpation, no adenopathy noted  RESP: lungs clear to auscultation - no rales, rhonchi or wheezes  CV: regular rates and rhythm, normal S1 S2, no murmur noted  SKIN: no suspicious lesions or rashes    Rapid Strep test is negative; await throat culture results.    ASSESSMENT:   Throat pain   Viral URI     PLAN:   Culture pending.    Symptomatic treat with gargles, lozenges, and OTC analgesic as needed. Follow-up with primary clinic if not improving.

## 2017-09-24 NOTE — NURSING NOTE
"Chief Complaint   Patient presents with     Urgent Care     Pharyngitis     c/o sore throat for 2 days       Initial BP 99/62  Pulse 87  Temp 97.7  F (36.5  C) (Tympanic)  Wt 125 lb 9 oz (57 kg)  LMP  (LMP Unknown)  SpO2 99%  Breastfeeding? No  BMI 23.34 kg/m2 Estimated body mass index is 23.34 kg/(m^2) as calculated from the following:    Height as of 9/11/17: 5' 1.5\" (1.562 m).    Weight as of this encounter: 125 lb 9 oz (57 kg).  Medication Reconciliation: complete   Agusitna Goins MA    "

## 2017-09-25 LAB
BACTERIA SPEC CULT: NORMAL
SPECIMEN SOURCE: NORMAL

## 2017-10-10 ENCOUNTER — THERAPY VISIT (OUTPATIENT)
Dept: PHYSICAL THERAPY | Facility: CLINIC | Age: 52
End: 2017-10-10
Payer: COMMERCIAL

## 2017-10-10 DIAGNOSIS — M25.60 JOINT STIFFNESS OF SPINE: ICD-10-CM

## 2017-10-10 DIAGNOSIS — M53.3 PAIN IN THE COCCYX: ICD-10-CM

## 2017-10-10 DIAGNOSIS — G89.29 CHRONIC MIDLINE LOW BACK PAIN WITH BILATERAL SCIATICA: ICD-10-CM

## 2017-10-10 DIAGNOSIS — M54.42 CHRONIC MIDLINE LOW BACK PAIN WITH BILATERAL SCIATICA: ICD-10-CM

## 2017-10-10 DIAGNOSIS — M54.41 CHRONIC MIDLINE LOW BACK PAIN WITH BILATERAL SCIATICA: ICD-10-CM

## 2017-10-10 PROCEDURE — 97112 NEUROMUSCULAR REEDUCATION: CPT | Mod: GP | Performed by: PHYSICAL THERAPIST

## 2017-10-10 PROCEDURE — 97530 THERAPEUTIC ACTIVITIES: CPT | Mod: GP | Performed by: PHYSICAL THERAPIST

## 2017-10-10 PROCEDURE — 97140 MANUAL THERAPY 1/> REGIONS: CPT | Mod: GP | Performed by: PHYSICAL THERAPIST

## 2017-10-10 NOTE — MR AVS SNAPSHOT
"              After Visit Summary   10/10/2017    Selene Lyn    MRN: [id number]           Patient Information     Date Of Birth          1965        Visit Information        Provider Department      10/10/2017 7:45 AM Nicki Kapadia, PT [facility]        Today's Diagnoses     Pain in the coccyx        Chronic midline low back pain with bilateral sciatica        Joint stiffness of spine           Follow-ups after your visit        Your next 10 appointments already scheduled     Nov 09, 2017  8:00 AM T[facility] with Nicki Kapadia PT   [facility] ([facility]  )    [address] [address] [address]   [phone]              Who to contact     If you have questions or need follow up information about today's clinic visit or your schedule please contact [facility] directly at [phone].  Normal or non-critical lab and imaging results will be communicated to you by MyChart, letter or phone within 4 business days after the clinic has received the results. If you do not hear from us within 7 days, please contact the clinic through MyChart or phone. If you have a critical or abnormal lab result, we will notify you by phone as soon as possible.  Submit refill requests through [employer] or call your pharmacy and they will forward the refill request to us. Please allow 3 business days for your refill to be completed.          Additional Information About Your Visit        MyChart Information     [employer] lets you send messages to your doctor, view your test results, renew your prescriptions, schedule appointments and more. To sign up, go to [email or web address] . Click on \"Log in\" on the left side of the screen, which will take you to the Welcome page. Then click on \"Sign up Now\" on the right side of the page.     You will be asked to enter the access code listed below, as well as some personal information. Please follow the directions to " create your username and password.     Your access code is: [email or web address]  Expires: 12/10/2017  8:57 AM     Your access code will  in 90 days. If you need help or a new code, please call your [place] clinic or [phone].        Care EveryWhere ID     This is your Care EveryWhere ID. This could be used by other organizations to access your [place] medical records  [id number]        Your Vitals Were     Last Period                   (LMP Unknown)            Blood Pressure from Last 3 Encounters:   17 99/62   17 102/62   16 98/62    Weight from Last 3 Encounters:   17 57 kg (125 lb 9 oz)   17 57.8 kg (127 lb 6.4 oz)   16 57.2 kg (126 lb 3.2 oz)              We Performed the Following     [facility] PROGRESS NOTES REPORT     MANUAL THER TECH,1+REGIONS,EA 15 MIN     NEUROMUSCULAR RE-EDUCATION     THERAPEUTIC ACTIVITIES        Primary Care Provider Office Phone # Fax #    Vonda Zaidi [phone]       [address] [address]        Equal Access to Services     [facility] : Hadii aad ku hadasho Soomaali, waaxda luqadaha, qaybta kaalmada adeegyada, waxay sahrain haylelan carlos a neri . So [facility] [phone].    ATENCIÓN: Si habla español, tiene a harrison disposición servicios gratuitos de asistencia lingüística. [facility] [phone].    We comply with applicable federal civil rights laws and [place] laws. We do not discriminate on the basis of race, color, national origin, age, disability, sex, sexual orientation, or gender identity.            Thank you!     [facility]. Our goal is always to provide you with excellent care. Hearing back from our patients is one way we can continue to improve our services. Please take a few minutes to complete the written survey that you may receive in the mail after your visit with us. Thank you!             Your Updated Medication List - Protect  others around you: Learn how to safely use, store and throw away your medicines at www.disposemymeds.org.          This list is accurate as of: 10/10/17  8:25 AM.  Always use your most recent med list.                   Brand Name Dispense Instructions for use Diagnosis    albuterol 108 (90 BASE) MCG/ACT Inhaler    PROAIR HFA    1 Inhaler    Inhale 2 puffs into the lungs every 6 hours as needed for shortness of breath / dyspnea    Mild intermittent asthma without complication       ALEVE PO           fluticasone 110 MCG/ACT Inhaler    FLOVENT HFA    3 Inhaler    Inhale 1 puff into the lungs 2 times daily During allergy season as needed    Mild intermittent asthma without complication, Acute seasonal allergic rhinitis due to pollen       montelukast 10 MG tablet    SINGULAIR    30 tablet    Take 1 tablet (10 mg) by mouth At Bedtime    Mild intermittent asthma without complication, Acute seasonal allergic rhinitis due to pollen       MULTIVITAMIN PO      Take  by mouth.    Routine gynecological examination, Diffuse cystic mastopathy       pantoprazole 40 MG EC tablet    PROTONIX    90 tablet    Take 1 tablet (40 mg) by mouth daily    Gastroesophageal reflux disease without esophagitis       VITAMIN D3       Diffuse cystic mastopathy       VITAMIN E NATURAL PO

## 2017-10-10 NOTE — LETTER
[facility]  [address] [address] [facility]  [address]  [facility] [address]  [phone]    October 10, 2017    Re: Selene Lyn   :   1965  MRN:  [id number]   REFERRING PHYSICIAN:   Yohannes PEREZ PT    Date of Initial Evaluation:  2017  Visits:  Rxs Used: 15  Reason for Referral:     Pain in the coccyx  Chronic midline low back pain with bilateral sciatica  Joint stiffness of spine    PROGRESS  REPORT    Progress reporting period is from 10/10/17.     SUBJECTIVE  Subjective changes noted by patient:  .  Subjective: A couple times a week will get some soreness in B ischial tuberosities. Not sure what flares it but calms down after a day. Also nothing that will relieve it. Tailbone better, good chair about 2 hours, bad chair 10-30 minutes. Some urgency of urination increased over the past year. Occasional dribbling. Drinks ~ 32 oz soda day. Able to delay voiding for about 1-2 hours.     Current pain level is 1/10  .     Previous pain level was   Initial Pain level: 9/10 (tailbone).   Changes in function:  Yes (See Goal flowsheet attached for changes in current functional level)  Adverse reaction to treatment or activity: sitting on bad chair  OBJECTIVE  Changes noted in objective findings:  Yes, Lumbar AROM WNL, end range tightness with extension. Coccyx slight stuck in flexion but improving with coccyx mobs and lumbar extension. 75% improvement in pelvic floor muscle tension (puborectalis). Discussed urge suppression with bladder and timed voiding goal 2-4 hours.   Objective: Urge suppression info given, goal 2-4 hours.    ASSESSMENT/PLAN  Updated problem list and treatment plan: Diagnosis 1:  Coccyx pain  Pain -  hot/cold therapy, manual therapy, splint/taping/bracing/orthotics, self management, education, directional preference exercise and home program  Decreased ROM/flexibility - manual therapy and therapeutic exercise  Decreased joint mobility - manual therapy and therapeutic  exercise  Decreased strength - therapeutic exercise and therapeutic activities  Decreased proprioception - neuro re-education and therapeutic activities  Inflammation - cold therapy and self management/home program  Impaired muscle performance - neuro re-education  Decreased function - therapeutic activities  Impaired posture - neuro re-education  STG/LTGs have been met or progress has been made towards goals:  Yes (See Goal flow sheet completed today.)          Re: Selene Lyn   :   1965    Assessment of Progress: The patient's condition is improving.  Self Management Plans:  Patient has been instructed in a home treatment program.  Patient  has been instructed in self management of symptoms.    Selene continues to require the following intervention to meet STG and LTG's:  PT    Recommendations:  This patient would benefit from continued therapy.     Frequency:  1 X a month, once daily  Duration:  for 2 months  Order expires 12/15/17.      Thank you for your referral.    INQUIRIES  Therapist: Nicki Kapadia, MS, PT, OCS  [facility]  [address] [address] [address]  Phone: [phone]  Fax: [phone]

## 2017-10-10 NOTE — PROGRESS NOTES
Subjective:    HPI                    Objective:    System    Physical Exam    General     ROS    Assessment/Plan:      PROGRESS  REPORT    Progress reporting period is from 10/10/17.       SUBJECTIVE  Subjective changes noted by patient:  .  Subjective: A couple times a week will get some soreness in B ischial tuberosities. Not sure what flares it but calms down after a day. Also nothing that will relieve it. Tailbone better, good chair about 2 hours, bad chair 10-30 minutes. Some urgency of urination increased over the past year. Occasional dribbling. Drinks ~ 32 oz soda day. Able to delay voiding for about 1-2 hours.     Current pain level is 1/10  .     Previous pain level was   Initial Pain level: 9/10 (tailbone).   Changes in function:  Yes (See Goal flowsheet attached for changes in current functional level)  Adverse reaction to treatment or activity: sitting on bad chair    OBJECTIVE  Changes noted in objective findings:  Yes, Lumbar AROM WNL, end range tightness with extension. Coccyx slight stuck in flexion but improving with coccyx mobs and lumbar extension. 75% improvement in pelvic floor muscle tension (puborectalis). Discussed urge suppression with bladder and timed voiding goal 2-4 hours.   Objective: Urge suppression info given, goal 2-4 hours.      ASSESSMENT/PLAN  Updated problem list and treatment plan: Diagnosis 1:  Coccyx pain  Pain -  hot/cold therapy, manual therapy, splint/taping/bracing/orthotics, self management, education, directional preference exercise and home program  Decreased ROM/flexibility - manual therapy and therapeutic exercise  Decreased joint mobility - manual therapy and therapeutic exercise  Decreased strength - therapeutic exercise and therapeutic activities  Decreased proprioception - neuro re-education and therapeutic activities  Inflammation - cold therapy and self management/home program  Impaired muscle performance - neuro re-education  Decreased function - therapeutic  activities  Impaired posture - neuro re-education  STG/LTGs have been met or progress has been made towards goals:  Yes (See Goal flow sheet completed today.)  Assessment of Progress: The patient's condition is improving.  Self Management Plans:  Patient has been instructed in a home treatment program.  Patient  has been instructed in self management of symptoms.    Selene continues to require the following intervention to meet STG and LTG's:  PT    Recommendations:  This patient would benefit from continued therapy.     Frequency:  1 X a month, once daily  Duration:  for 2 months  Order expires 12/15/17.      Please refer to the daily flowsheet for treatment today, total treatment time and time spent performing 1:1 timed codes.

## 2017-11-01 ENCOUNTER — THERAPY VISIT (OUTPATIENT)
Dept: PHYSICAL THERAPY | Facility: CLINIC | Age: 52
End: 2017-11-01
Payer: COMMERCIAL

## 2017-11-01 DIAGNOSIS — M25.60 JOINT STIFFNESS OF SPINE: ICD-10-CM

## 2017-11-01 DIAGNOSIS — M53.3 PAIN IN THE COCCYX: ICD-10-CM

## 2017-11-01 PROCEDURE — 97140 MANUAL THERAPY 1/> REGIONS: CPT | Mod: GP | Performed by: PHYSICAL THERAPIST

## 2017-11-01 PROCEDURE — 97035 APP MDLTY 1+ULTRASOUND EA 15: CPT | Mod: GP | Performed by: PHYSICAL THERAPIST

## 2017-11-01 PROCEDURE — 97112 NEUROMUSCULAR REEDUCATION: CPT | Mod: GP | Performed by: PHYSICAL THERAPIST

## 2017-12-04 ENCOUNTER — THERAPY VISIT (OUTPATIENT)
Dept: PHYSICAL THERAPY | Facility: CLINIC | Age: 52
End: 2017-12-04
Payer: COMMERCIAL

## 2017-12-04 DIAGNOSIS — M25.60 JOINT STIFFNESS OF SPINE: ICD-10-CM

## 2017-12-04 DIAGNOSIS — M54.50 LUMBAGO: ICD-10-CM

## 2017-12-04 DIAGNOSIS — M53.3 PAIN IN THE COCCYX: ICD-10-CM

## 2017-12-04 PROCEDURE — 97112 NEUROMUSCULAR REEDUCATION: CPT | Mod: GP | Performed by: PHYSICAL THERAPIST

## 2017-12-04 PROCEDURE — 97110 THERAPEUTIC EXERCISES: CPT | Mod: GP | Performed by: PHYSICAL THERAPIST

## 2017-12-04 PROCEDURE — 97140 MANUAL THERAPY 1/> REGIONS: CPT | Mod: GP | Performed by: PHYSICAL THERAPIST

## 2017-12-04 NOTE — MR AVS SNAPSHOT
"              After Visit Summary   2017    Selene Lyn    MRN: [id number]           Patient Information     Date Of Birth          1965        Visit Information        Provider Department      2017 7:45 AM Nicki Kapadia, PT [facility]        Today's Diagnoses     Pain in the coccyx        Lumbago        Joint stiffness of spine           Follow-ups after your visit        Who to contact     If you have questions or need follow up information about today's clinic visit or your schedule please contact [facility] directly at [phone].  Normal or non-critical lab and imaging results will be communicated to you by [employer], letter or phone within 4 business days after the clinic has received the results. If you do not hear from us within 7 days, please contact the clinic through [employer] or phone. If you have a critical or abnormal lab result, we will notify you by phone as soon as possible.  Submit refill requests through [employer] or call your pharmacy and they will forward the refill request to us. Please allow 3 business days for your refill to be completed.          Additional Information About Your Visit        MyChart Information     [employer] lets you send messages to your doctor, view your test results, renew your prescriptions, schedule appointments and more. To sign up, go to [email or web address] . Click on \"Log in\" on the left side of the screen, which will take you to the Welcome page. Then click on \"Sign up Now\" on the right side of the page.     You will be asked to enter the access code listed below, as well as some personal information. Please follow the directions to create your username and password.     Your access code is: [email or web address]  Expires: 12/10/2017  7:57 AM     Your access code will  in 90 days. If you need help or a new code, please call your [place] clinic or [phone].        Care EveryWhere ID     This is your Care EveryWhere ID. " This could be used by other organizations to access your [place] medical records  [id number]        Your Vitals Were     Last Period                   (LMP Unknown)            Blood Pressure from Last 3 Encounters:   09/24/17 99/62   09/11/17 102/62   12/22/16 98/62    Weight from Last 3 Encounters:   09/24/17 57 kg (125 lb 9 oz)   09/11/17 57.8 kg (127 lb 6.4 oz)   12/22/16 57.2 kg (126 lb 3.2 oz)              We Performed the Following     [facility] PROGRESS NOTES REPORT     MANUAL THER TECH,1+REGIONS,EA 15 MIN     NEUROMUSCULAR RE-EDUCATION     THERAPEUTIC EXERCISES        Primary Care Provider Office Phone # Fax #    Vonda Zaidi [phone] [address]  [address] [place] [address]        Equal Access to Services     [facility]CI[facility] : Hadii aad ku hadasho Soomaali, waaxda luqadaha, qaybta kaalmada adeegyada, ger neri . So [facility] [phone].    ATENCIÓN: Si habla español, tiene a harrison disposición servicios gratuitos de asistencia lingüística. [facility] [phone].    We comply with applicable federal civil rights laws and [place] laws. We do not discriminate on the basis of race, color, national origin, age, disability, sex, sexual orientation, or gender identity.            Thank you!     Thank you for choosing [facility]   for your care. Our goal is always to provide you with excellent care. Hearing back from our patients is one way we can continue to improve our services. Please take a few minutes to complete the written survey that you may receive in the mail after your visit with us. Thank you!             Your Updated Medication List - Protect others around you: Learn how to safely use, store and throw away your medicines at www.disposemymeds.org.          This list is accurate as of: 12/4/17  8:25 AM.  Always use your most recent med list.                   Brand Name Dispense Instructions for use Diagnosis    albuterol 108 (90 BASE)  MCG/ACT Inhaler    PROAIR HFA    1 Inhaler    Inhale 2 puffs into the lungs every 6 hours as needed for shortness of breath / dyspnea    Mild intermittent asthma without complication       ALEVE PO           fluticasone 110 MCG/ACT Inhaler    FLOVENT HFA    3 Inhaler    Inhale 1 puff into the lungs 2 times daily During allergy season as needed    Mild intermittent asthma without complication, Acute seasonal allergic rhinitis due to pollen       montelukast 10 MG tablet    SINGULAIR    30 tablet    Take 1 tablet (10 mg) by mouth At Bedtime    Mild intermittent asthma without complication, Acute seasonal allergic rhinitis due to pollen       MULTIVITAMIN PO      Take  by mouth.    Routine gynecological examination, Diffuse cystic mastopathy       pantoprazole 40 MG EC tablet    PROTONIX    90 tablet    Take 1 tablet (40 mg) by mouth daily    Gastroesophageal reflux disease without esophagitis       VITAMIN D3       Diffuse cystic mastopathy       VITAMIN E NATURAL PO

## 2017-12-04 NOTE — LETTER
[facility]  [address] [address]  [address]  [place] [address]  [phone]    2017    Re: Selene Lyn   :   1965  MRN:  [id number]   REFERRING PHYSICIAN:   Yohannes PEREZ PT    Date of Initial Evaluation:  2016   Visits:  Rxs Used: 17  Reason for Referral:     Pain in the coccyx  Lumbago  Joint stiffness of spine    DISCHARGE REPORT    Progress reporting period is from 17.       SUBJECTIVE  Subjective changes noted by patient:  .  Subjective: Overall doing pretty well. Coccyx pain intermittently, about every other day or so with burning. Has been trying to stand more so now heels hurting. Does have to sit on soft surface for prolonged sitting at least an hour.     Current pain level is 3/10  .     Previous pain level was   Initial Pain level: 9/10 (tailbone).   Changes in function:  Yes (See Goal flowsheet attached for changes in current functional level)  Adverse reaction to treatment or activity: None  OBJECTIVE  Changes noted in objective findings:  Yes,   Objective: WNL coccyx alignment. Mild tightness B LA/pelvic floor. Kegel strength 3+/5.  Lumbar ROM WNL except min loss extension.  ASSESSMENT/PLAN  Updated problem list and treatment plan:   STG/LTGs have been met or progress has been made towards goals:  Yes (See Goal flow sheet completed today.)  Assessment of Progress: The patient's condition is improving.  Self Management Plans:  Patient is independent in a home treatment program.  Patient is independent in self management of symptoms.  Selene continues to require the following intervention to meet STG and LTG's:  PT intervention is no longer required to meet STG/LTG.    Recommendations:  This patient is ready to be discharged from therapy and continue their home treatment program.     Thank you for your referral.    INQUIRIES  Therapist: Nicki Kapadia, MS, PT, OCS  [facility]  [address] [address]  [address]  [place]  [address]  Phone: [phone] / Fax: [phone]

## 2017-12-04 NOTE — PROGRESS NOTES
Subjective:    HPI                    Objective:    System    Physical Exam    General     ROS    Assessment/Plan:      DISCHARGE REPORT    Progress reporting period is from 12/04/17.       SUBJECTIVE  Subjective changes noted by patient:  .  Subjective: Overall doing pretty well. Coccyx pain intermittently, about every other day or so with burning. Has been trying to stand more so now heels hurting. Does have to sit on soft surface for prolonged sitting at least an hour.     Current pain level is 3/10  .     Previous pain level was   Initial Pain level: 9/10 (tailbone).   Changes in function:  Yes (See Goal flowsheet attached for changes in current functional level)  Adverse reaction to treatment or activity: None    OBJECTIVE  Changes noted in objective findings:  Yes,   Objective: WNL coccyx alignment. Mild tightness B LA/pelvic floor. Kegel strength 3+/5.  Lumbar ROM WNL except min loss extension.    ASSESSMENT/PLAN  Updated problem list and treatment plan:   STG/LTGs have been met or progress has been made towards goals:  Yes (See Goal flow sheet completed today.)  Assessment of Progress: The patient's condition is improving.  Self Management Plans:  Patient is independent in a home treatment program.  Patient is independent in self management of symptoms.    Selene continues to require the following intervention to meet STG and LTG's:  PT intervention is no longer required to meet STG/LTG.    Recommendations:  This patient is ready to be discharged from therapy and continue their home treatment program.    Please refer to the daily flowsheet for treatment today, total treatment time and time spent performing 1:1 timed codes.

## 2017-12-12 ENCOUNTER — OFFICE VISIT (OUTPATIENT)
Dept: FAMILY MEDICINE | Facility: CLINIC | Age: 52
End: 2017-12-12

## 2017-12-12 VITALS
HEART RATE: 76 BPM | HEIGHT: 62 IN | TEMPERATURE: 98.2 F | SYSTOLIC BLOOD PRESSURE: 102 MMHG | DIASTOLIC BLOOD PRESSURE: 64 MMHG | RESPIRATION RATE: 20 BRPM | WEIGHT: 130 LBS | BODY MASS INDEX: 23.92 KG/M2 | OXYGEN SATURATION: 98 %

## 2017-12-12 DIAGNOSIS — J01.90 ACUTE SINUSITIS WITH SYMPTOMS > 10 DAYS: Primary | ICD-10-CM

## 2017-12-12 LAB — S PYO AG THROAT QL IA.RAPID: NORMAL

## 2017-12-12 PROCEDURE — 99213 OFFICE O/P EST LOW 20 MIN: CPT | Performed by: PHYSICIAN ASSISTANT

## 2017-12-12 PROCEDURE — 87070 CULTURE OTHR SPECIMN AEROBIC: CPT | Performed by: PHYSICIAN ASSISTANT

## 2017-12-12 PROCEDURE — 87880 STREP A ASSAY W/OPTIC: CPT | Performed by: PHYSICIAN ASSISTANT

## 2017-12-12 RX ORDER — AZITHROMYCIN 250 MG/1
TABLET, FILM COATED ORAL
Qty: 6 TABLET | Refills: 0 | Status: SHIPPED | OUTPATIENT
Start: 2017-12-12 | End: 2018-05-15

## 2017-12-12 NOTE — NURSING NOTE
Selene Lyn is here for cough and congestion for the past week.   Questioned patient about current smoking habits.  Pt. has never smoked.  PULSE regular  My Chart:   CLASSIFICATION OF OVERWEIGHT AND OBESITY BY BMI                        Obesity Class           BMI(kg/m2)  Underweight                                    < 18.5  Normal                                         18.5-24.9  Overweight                                     25.0-29.9  OBESITY                     I                  30.0-34.9                             II                 35.0-39.9  EXTREME OBESITY             III                >40                            Patient's  BMI Body mass index is 24.17 kg/(m^2).  http://hin.nhlbi.nih.gov/menuplanner/menu.cgi  Pre-visit planning  Immunizations - up to date  Colonoscopy - is up to date  Mammogram - is up to date  Asthma -   PHQ9 -    LETTY-7 -

## 2017-12-12 NOTE — PROGRESS NOTES
SUBJECTIVE:                                                    Selene Lyn is a 52 year old female who presents to clinic today for the following health issues:    Selene is here with 10 days of URI sx. Sx include:   Sore thorat, sinus congestion, bilateral ear pain R>L.  Denies headaches. No blood/pus from ears.  Did have chills yesterday and last week.    Used albuterol once last week.     OTC: cough suppressant, Sudafed, APAP        BP Readings from Last 3 Encounters:   17 102/64   17 99/62   17 102/62    Wt Readings from Last 3 Encounters:   17 59 kg (130 lb)   17 57 kg (125 lb 9 oz)   17 57.8 kg (127 lb 6.4 oz)            Patient Active Problem List   Diagnosis     Mild intermittent asthma     Diffuse cystic mastopathy     Plantar fascial fibromatosis     Allergic rhinitis     Other chronic allergic conjunctivitis     ACP (advance care planning)     Health Care Home     Pain in joint, shoulder region     Past Surgical History:   Procedure Laterality Date     C NONSPECIFIC PROCEDURE      ablation of rentrant tachy loop       Social History   Substance Use Topics     Smoking status: Never Smoker     Smokeless tobacco: Never Used      Comment: exposed as a child but never smoked     Alcohol use 0.0 oz/week     0 Standard drinks or equivalent per week      Comment: one drink every 3 months/      Family History   Problem Relation Age of Onset     DIABETES Maternal Grandmother      CANCER Mother      lung cancer   69     DIABETES Sister 40     obesity     Breast Cancer No family hx of      cystic mastopathy     Cancer - colorectal No family hx of      C.A.D. No family hx of      Eye Disorder No family hx of      Depression No family hx of          Current Outpatient Prescriptions   Medication Sig Dispense Refill     azithromycin (ZITHROMAX) 250 MG tablet Two tablets first day, then one tablet daily for four days. 6 tablet 0     albuterol (PROAIR HFA) 108 (90 BASE) MCG/ACT  "Inhaler Inhale 2 puffs into the lungs every 6 hours as needed for shortness of breath / dyspnea 1 Inhaler 3     pantoprazole (PROTONIX) 40 MG EC tablet Take 1 tablet (40 mg) by mouth daily 90 tablet 3     Naproxen Sodium (ALEVE PO)        VITAMIN E NATURAL PO        Multiple Vitamin (MULTIVITAMIN OR) Take  by mouth.       Cholecalciferol (VITAMIN D3)        montelukast (SINGULAIR) 10 MG tablet Take 1 tablet (10 mg) by mouth At Bedtime 30 tablet 2     fluticasone (FLOVENT HFA) 110 MCG/ACT Inhaler Inhale 1 puff into the lungs 2 times daily During allergy season as needed 3 Inhaler 0       Allergies   Allergen Reactions     Erythrocin Diarrhea     Penicillins      Sulfa Drugs        OBJECTIVE:                                                    /64 (BP Location: Left arm, Patient Position: Chair, Cuff Size: Adult Regular)  Pulse 76  Temp 98.2  F (36.8  C) (Oral)  Resp 20  Ht 1.562 m (5' 1.5\")  Wt 59 kg (130 lb)  LMP  (LMP Unknown)  SpO2 98%  Breastfeeding? No  BMI 24.17 kg/m2 Body mass index is 24.17 kg/(m^2).     GENERAL: alert and no distress  HEAD: Normocephalic, atraumatic  EYES: Eyes grossly normal to inspection, extraocular movements - intact  EARS:   Right: External ear and canal normal, TM normal  Left: External ear and canal normal, TM normal  NOSE: No discharge noted  MOUTH/THROAT: no ulcers, no lesions, pharynx is erythematous, no exudates present, tonsils without hypertrophy, mucous membranes moist.   Left maxillary tenderness to percussion  NECK: no tenderness, no adenopathy, no asymmetry, no masses, no stiffness; thyroid- normal to palpation  RESP: lungs clear to auscultation - no crackles, no rhonchi, no wheezes  CV: regular rates and rhythm, normal S1 S2, no S3 or S4 and no murmur, no click or rub -         ASSESSMENT/PLAN:                                                      1. Acute sinusitis with symptoms > 10 days  - azithromycin (ZITHROMAX) 250 MG tablet; Two tablets first day, then " one tablet daily for four days.  Dispense: 6 tablet; Refill: 0  - RAPID STREP (BFP)  - THROAT CULTURE (BFP)    Suspect viral illness.  Conservative tx includes Sudafed, Delsym, Mucinex, Fluids, OTC pain relievers.    If sx no improving end of week, fill abx.  Probiotic encouraged    Tia Vinson PA-C  [facility]

## 2017-12-12 NOTE — MR AVS SNAPSHOT
"              After Visit Summary   2017    Selene Lyn    MRN: [id number]           Patient Information     Date Of Birth          1965        Visit Information        Provider Department      2017 9:30 AM Tia Vinson PA [facility]        Today's Diagnoses     Acute sinusitis with symptoms > 10 days    -  1       Follow-ups after your visit        Who to contact     If you have questions or need follow up information about today's clinic visit or your schedule please contact [facility] directly at [phone].  Normal or non-critical lab and imaging results will be communicated to you by MyChart, letter or phone within 4 business days after the clinic has received the results. If you do not hear from us within 7 days, please contact the clinic through [employer] or phone. If you have a critical or abnormal lab result, we will notify you by phone as soon as possible.  Submit refill requests through [employer] or call your pharmacy and they will forward the refill request to us. Please allow 3 business days for your refill to be completed.          Additional Information About Your Visit        MyChart Information     [employer] lets you send messages to your doctor, view your test results, renew your prescriptions, schedule appointments and more. To sign up, go to [email or web address] . Click on \"Log in\" on the left side of the screen, which will take you to the Welcome page. Then click on \"Sign up Now\" on the right side of the page.     You will be asked to enter the access code listed below, as well as some personal information. Please follow the directions to create your username and password.     Your access code is: [email or web address]  Expires: 3/12/2018  6:34 PM     Your access code will  in 90 days. If you need help or a new code, please call your [place] clinic or [phone].        Care EveryWhere ID     This is your Care " "EveryWhere ID. This could be used by other organizations to access your [place] medical records  [id number]        Your Vitals Were     Pulse Temperature Respirations Height Last Period Pulse Oximetry    76 98.2  F (36.8  C) (Oral) 20 1.562 m (5' 1.5\") (LMP Unknown) 98%    Breastfeeding? BMI (Body Mass Index)                No 24.17 kg/m2           Blood Pressure from Last 3 Encounters:   12/12/17 102/64   09/24/17 99/62   09/11/17 102/62    Weight from Last 3 Encounters:   12/12/17 59 kg (130 lb)   09/24/17 57 kg (125 lb 9 oz)   09/11/17 57.8 kg (127 lb 6.4 oz)              We Performed the Following     RAPID STREP (BFP)     THROAT CULTURE (BFP)          Today's Medication Changes          These changes are accurate as of: 12/12/17  6:34 PM.  If you have any questions, ask your nurse or doctor.               Start taking these medicines.        Dose/Directions    azithromycin 250 MG tablet   Commonly known as:  ZITHROMAX   Used for:  Acute sinusitis with symptoms > 10 days   Started by:  Tia Vinson PA        Two tablets first day, then one tablet daily for four days.   Quantity:  6 tablet   Refills:  0            Where to get your medicines      Some of these will need a paper prescription and others can be bought over the counter.  Ask your nurse if you have questions.     Bring a paper prescription for each of these medications     azithromycin 250 MG tablet                Primary Care Provider Office Phone # Fax #    Vonda Zaidi [phone]       [facility] [address] [address]        Equal Access to Services     [facility]: Hadii trinidad lacey hadasho Soalexandruali, waaxda luqadaha, qaybta kaalmada adejun, ger lynn. So [facility] [phone].    ATENCIÓN: Si habla español, tiene a harrison disposición servicios gratuitos de asistencia lingüística. Llame al [phone].    We comply with applicable federal civil rights laws and " [place] laws. We do not discriminate on the basis of race, color, national origin, age, disability, sex, sexual orientation, or gender identity.            Thank you!     Thank you for choosing [facility]  for your care. Our goal is always to provide you with excellent care. Hearing back from our patients is one way we can continue to improve our services. Please take a few minutes to complete the written survey that you may receive in the mail after your visit with us. Thank you!             Your Updated Medication List - Protect others around you: Learn how to safely use, store and throw away your medicines at www.disposemymeds.org.          This list is accurate as of: 12/12/17  6:34 PM.  Always use your most recent med list.                   Brand Name Dispense Instructions for use Diagnosis    albuterol 108 (90 BASE) MCG/ACT Inhaler    PROAIR HFA    1 Inhaler    Inhale 2 puffs into the lungs every 6 hours as needed for shortness of breath / dyspnea    Mild intermittent asthma without complication       ALEVE PO           azithromycin 250 MG tablet    ZITHROMAX    6 tablet    Two tablets first day, then one tablet daily for four days.    Acute sinusitis with symptoms > 10 days       fluticasone 110 MCG/ACT Inhaler    FLOVENT HFA    3 Inhaler    Inhale 1 puff into the lungs 2 times daily During allergy season as needed    Mild intermittent asthma without complication, Acute seasonal allergic rhinitis due to pollen       montelukast 10 MG tablet    SINGULAIR    30 tablet    Take 1 tablet (10 mg) by mouth At Bedtime    Mild intermittent asthma without complication, Acute seasonal allergic rhinitis due to pollen       MULTIVITAMIN PO      Take  by mouth.    Routine gynecological examination, Diffuse cystic mastopathy       pantoprazole 40 MG EC tablet    PROTONIX    90 tablet    Take 1 tablet (40 mg) by mouth daily    Gastroesophageal reflux disease without esophagitis       VITAMIN D3        Diffuse cystic mastopathy       VITAMIN E NATURAL PO

## 2017-12-14 LAB — THROAT CULTURE: NORMAL

## 2018-01-20 ENCOUNTER — OFFICE VISIT (OUTPATIENT)
Dept: URGENT CARE | Facility: URGENT CARE | Age: 53
End: 2018-01-20
Payer: COMMERCIAL

## 2018-01-20 VITALS
OXYGEN SATURATION: 97 % | BODY MASS INDEX: 22.31 KG/M2 | SYSTOLIC BLOOD PRESSURE: 100 MMHG | DIASTOLIC BLOOD PRESSURE: 66 MMHG | WEIGHT: 120 LBS | TEMPERATURE: 99 F | HEART RATE: 93 BPM

## 2018-01-20 DIAGNOSIS — J45.21 MILD INTERMITTENT ASTHMA WITH EXACERBATION: ICD-10-CM

## 2018-01-20 DIAGNOSIS — R05.9 COUGH: Primary | ICD-10-CM

## 2018-01-20 PROCEDURE — 99213 OFFICE O/P EST LOW 20 MIN: CPT | Performed by: FAMILY MEDICINE

## 2018-01-20 RX ORDER — PREDNISONE 20 MG/1
TABLET ORAL
Qty: 14 TABLET | Refills: 0 | Status: SHIPPED | OUTPATIENT
Start: 2018-01-20 | End: 2018-05-15

## 2018-01-20 RX ORDER — BENZONATATE 100 MG/1
200 CAPSULE ORAL 3 TIMES DAILY PRN
Qty: 42 CAPSULE | Refills: 3 | Status: SHIPPED | OUTPATIENT
Start: 2018-01-20 | End: 2018-05-15

## 2018-01-20 NOTE — MR AVS SNAPSHOT
"              After Visit Summary   1/20/2018    Selene Lyn    MRN: [id number]           Patient Information     Date Of Birth          1965        Visit Information        Provider Department      1/20/2018 10:25 AM Jon [facility]olomew [facility]        Today's Diagnoses     Cough    -  1    Mild intermittent asthma with exacerbation          Care Instructions    Restart the Albuterol Inhaler (two puffs every 4-6 hours as needed for cough/shortness of breath/chest tightness/wheezing)    Continue the Fluticasone inhaler    follow up with the primary care provider if not better in 7-10 days.      Go to the emergency room if you develop worsening severe shortness of breath.                Follow-ups after your visit        Who to contact     If you have questions or need follow up information about today's clinic visit or your schedule please contact [facility] directly at [phone].  Normal or non-critical lab and imaging results will be communicated to you by MyChart, letter or phone within 4 business days after the clinic has received the results. If you do not hear from us within 7 days, please contact the clinic through [employer] or phone. If you have a critical or abnormal lab result, we will notify you by phone as soon as possible.  Submit refill requests through [employer] or call your pharmacy and they will forward the refill request to us. Please allow 3 business days for your refill to be completed.          Additional Information About Your Visit        MyChart Information     [employer]u lets you send messages to your doctor, view your test results, renew your prescriptions, schedule appointments and more. To sign up, go to [email or web address] . Click on \"Log in\" on the left side of the screen, which will take you to the Welcome page. Then click on \"Sign up Now\" on the right side of the page.     You will be asked to enter the access code listed below, as well as some " personal information. Please follow the directions to create your username and password.     Your access code is: [email or web address]  Expires: 3/12/2018  6:34 PM     Your access code will  in 90 days. If you need help or a new code, please call your [place] clinic or [phone].        Care EveryWhere ID     This is your Care EveryWhere ID. This could be used by other organizations to access your [place] medical records  [id number]        Your Vitals Were     Pulse Temperature Last Period Pulse Oximetry BMI (Body Mass Index)       93 99  F (37.2  C) (Oral) (LMP Unknown) 97% 22.31 kg/m2        Blood Pressure from Last 3 Encounters:   18 100/66   17 102/64   17 99/62    Weight from Last 3 Encounters:   18 120 lb (54.4 kg)   17 130 lb (59 kg)   17 125 lb 9 oz (57 kg)              Today, you had the following     No orders found for display         Today's Medication Changes          These changes are accurate as of: 18  1:11 PM.  If you have any questions, ask your nurse or doctor.               Start taking these medicines.        Dose/Directions    benzonatate 100 MG capsule   Commonly known as:  TESSALON   Used for:  Cough        Dose:  200 mg   Take 2 capsules (200 mg) by mouth 3 times daily as needed   Quantity:  42 capsule   Refills:  3       predniSONE 20 MG tablet   Commonly known as:  DELTASONE   Used for:  Mild intermittent asthma with exacerbation        Take 2 tabs PO daily x 7 days   Quantity:  14 tablet   Refills:  0            Where to get your medicines      These medications were sent to [facility] [address] [facility] [address] [facility] [address]  [address] [facility] [address], [facility] [address]     Phone:  [phone]     benzonatate 100 MG capsule    predniSONE 20 MG tablet                Primary Care Provider Office Phone # Fax #    Vonda Zaidi [phone]       [address]  [address]        Equal Access to Services     [facility] : Hadii trinidad lacey masood [place], wadavionda luqadaha, qaaldenta kaalmada presleyobinnavirginia, waxadam chuck montenegrolornamike lynn. So [facility] [phone].    ATENCIÓN: Si habla español, tiene a harrison disposición servicios gratuitos de asistencia lingüística. Aryaame al [phone].    We comply with applicable federal civil rights laws and [place] laws. We do not discriminate on the basis of race, color, national origin, age, disability, sex, sexual orientation, or gender identity.            Thank you!     Thank you for choosing [facility]  for your care. Our goal is always to provide you with excellent care. Hearing back from our patients is one way we can continue to improve our services. Please take a few minutes to complete the written survey that you may receive in the mail after your visit with us. Thank you!             Your Updated Medication List - Protect others around you: Learn how to safely use, store and throw away your medicines at www.disposemymeds.org.          This list is accurate as of: 1/20/18  1:11 PM.  Always use your most recent med list.                   Brand Name Dispense Instructions for use Diagnosis    albuterol 108 (90 BASE) MCG/ACT Inhaler    PROAIR HFA    1 Inhaler    Inhale 2 puffs into the lungs every 6 hours as needed for shortness of breath / dyspnea    Mild intermittent asthma without complication       ALEVE PO           azithromycin 250 MG tablet    ZITHROMAX    6 tablet    Two tablets first day, then one tablet daily for four days.    Acute sinusitis with symptoms > 10 days       benzonatate 100 MG capsule    TESSALON    42 capsule    Take 2 capsules (200 mg) by mouth 3 times daily as needed    Cough       fluticasone 110 MCG/ACT Inhaler    FLOVENT HFA    3 Inhaler    Inhale 1 puff into the lungs 2 times daily During allergy season as needed    Mild intermittent asthma without complication, Acute seasonal allergic  rhinitis due to pollen       montelukast 10 MG tablet    SINGULAIR    30 tablet    Take 1 tablet (10 mg) by mouth At Bedtime    Mild intermittent asthma without complication, Acute seasonal allergic rhinitis due to pollen       MULTIVITAMIN PO      Take  by mouth.    Routine gynecological examination, Diffuse cystic mastopathy       pantoprazole 40 MG EC tablet    PROTONIX    90 tablet    Take 1 tablet (40 mg) by mouth daily    Gastroesophageal reflux disease without esophagitis       predniSONE 20 MG tablet    DELTASONE    14 tablet    Take 2 tabs PO daily x 7 days    Mild intermittent asthma with exacerbation       VITAMIN D3       Diffuse cystic mastopathy       VITAMIN E NATURAL PO

## 2018-01-20 NOTE — PATIENT INSTRUCTIONS
Restart the Albuterol Inhaler (two puffs every 4-6 hours as needed for cough/shortness of breath/chest tightness/wheezing)    Continue the Fluticasone inhaler    follow up with the primary care provider if not better in 7-10 days.      Go to the emergency room if you develop worsening severe shortness of breath.

## 2018-01-20 NOTE — PROGRESS NOTES
"Chief Complaint   Patient presents with     Urgent Care     URI     start 2 days, cough, sneezing, pressure and popping in both ears a, scratchy throat, fatigue, chest congestion tx: cough drops, inhaler, Advil        Initial /66  Pulse 93  Temp 99  F (37.2  C) (Oral)  Wt 120 lb (54.4 kg)  LMP  (LMP Unknown)  SpO2 97%  BMI 22.31 kg/m2 Estimated body mass index is 22.31 kg/(m^2) as calculated from the following:    Height as of 12/12/17: 5' 1.5\" (1.562 m).    Weight as of this encounter: 120 lb (54.4 kg).  Medication Reconciliation: complete   Estephania Stout MA      "

## 2018-05-15 ENCOUNTER — OFFICE VISIT (OUTPATIENT)
Dept: URGENT CARE | Facility: URGENT CARE | Age: 53
End: 2018-05-15
Payer: COMMERCIAL

## 2018-05-15 VITALS
TEMPERATURE: 99 F | HEART RATE: 80 BPM | WEIGHT: 120 LBS | BODY MASS INDEX: 22.31 KG/M2 | DIASTOLIC BLOOD PRESSURE: 69 MMHG | SYSTOLIC BLOOD PRESSURE: 115 MMHG | OXYGEN SATURATION: 99 %

## 2018-05-15 DIAGNOSIS — R07.0 THROAT PAIN: ICD-10-CM

## 2018-05-15 DIAGNOSIS — J45.31 MILD PERSISTENT ASTHMA WITH EXACERBATION: Primary | ICD-10-CM

## 2018-05-15 LAB
DEPRECATED S PYO AG THROAT QL EIA: NORMAL
SPECIMEN SOURCE: NORMAL

## 2018-05-15 PROCEDURE — 87880 STREP A ASSAY W/OPTIC: CPT | Performed by: NURSE PRACTITIONER

## 2018-05-15 PROCEDURE — 99214 OFFICE O/P EST MOD 30 MIN: CPT | Performed by: NURSE PRACTITIONER

## 2018-05-15 PROCEDURE — 87081 CULTURE SCREEN ONLY: CPT | Performed by: NURSE PRACTITIONER

## 2018-05-15 RX ORDER — MONTELUKAST SODIUM 10 MG/1
10 TABLET ORAL AT BEDTIME
Qty: 30 TABLET | Refills: 1 | Status: SHIPPED | OUTPATIENT
Start: 2018-05-15 | End: 2018-07-06

## 2018-05-15 RX ORDER — FLUTICASONE PROPIONATE 110 UG/1
1 AEROSOL, METERED RESPIRATORY (INHALATION) 2 TIMES DAILY
Qty: 1 INHALER | Refills: 1 | Status: SHIPPED | OUTPATIENT
Start: 2018-05-15 | End: 2018-10-09

## 2018-05-15 RX ORDER — ALBUTEROL SULFATE 90 UG/1
2 AEROSOL, METERED RESPIRATORY (INHALATION) EVERY 4 HOURS PRN
Qty: 1 INHALER | Refills: 1 | Status: SHIPPED | OUTPATIENT
Start: 2018-05-15 | End: 2018-10-09

## 2018-05-15 RX ORDER — PREDNISONE 20 MG/1
TABLET ORAL
Qty: 10 TABLET | Refills: 0 | Status: SHIPPED | OUTPATIENT
Start: 2018-05-15 | End: 2018-07-06

## 2018-05-15 ASSESSMENT — ENCOUNTER SYMPTOMS
VOMITING: 0
ABDOMINAL PAIN: 0
CHILLS: 1
SORE THROAT: 0
SHORTNESS OF BREATH: 1
WHEEZING: 1
HEADACHES: 0
SPUTUM PRODUCTION: 0
SINUS PAIN: 0
FEVER: 0
DIZZINESS: 0
COUGH: 1
NAUSEA: 0

## 2018-05-15 NOTE — MR AVS SNAPSHOT
After Visit Summary   5/15/2018    Selene Lyn    MRN: [id number]           Patient Information     Date Of Birth          1965        Visit Information        Provider Department      5/15/2018 4:45 PM Ilya Kurtz NP [facility]        Today's Diagnoses     Mild persistent asthma with exacerbation    -  1    Throat pain          Care Instructions      Asthma (Adult)  Asthma is a disease where the medium and  small air passages within the lung go into spasm and restrict the flow of air. Inflammation and swelling of the airways cause further blockage. During an acute asthma attack, these factors cause trouble breathing, wheezing, cough and chest tightness.    An asthma attack can be triggered by many things. Common triggers include infections such as the common cold, bronchitis, and pneumonia. Irritants such as smoke or pollutants in the air, very cold air, emotional upset, and exercise can also trigger an attack. In many adults with asthma, allergies to dust, mold, pollen and animal dander can cause an asthma attack. Skipping doses of daily asthma medicine can also bring on an asthma attack.  Asthma can be controlled using the proper medicines prescribed by your healthcare provider and avoiding exposure to known triggers including allergens and irritants.  Home care    Take prescribed medicine exactly at the times advised. If you need medicine such as from a hand held inhaler or aerosol breathing machine more than every 4 hours, contact your healthcare provider or seek immediate medical attention. If prescribed an antibiotic or prednisone, take all of the medicine as prescribed, even if you are feeling better after a few days.    Don't smoke. Avoid being exposed to the smoke of others.    Some people with asthma have worsening of their symptoms when they take aspirin and non-steroidal or fever-reducing medicines like ibuprofen and naproxen. Talk to your healthcare  "provider if you think this may apply to you.  Follow-up care  Follow up with your healthcare provider, or as advised. Always bring all of your current medicines to any appointments with your healthcare provider. Also bring a complete list of medicines even those not taken for asthma. If you don't already have one, talk to your healthcare provider about developing your own \"Asthma Action Plan.\"  A pneumococcal (pneumonia) vaccine and yearly flu shot (every fall) are recommended. Ask your doctor about this.  When to seek medical advice  Call your healthcare provider right away if any of these occur:     Increased wheezing or shortness of breath    Need to use your inhalers more often than usual without relief    Fever of 100.4 F (38 C) or higher, or as directed by your healthcare provider    Coughing up lots of dark-colored or bloody sputum (mucus)    Chest pain with each breath    If you use a peak flow meter as part of an Asthma Action Plan, and you are still in the yellow zone (50% to 80%) 15 minutes after using inhaler medicine.  Call 911  Call 911 if any of the following occur    Trouble walking or talking because of shortness of breath    If you use a peak flow meter as part of an Asthma Action Plan and you are still in the red zone (less than 50%) 15 minutes after using inhaler medicine    Lips or fingernails turning gray or blue  Date Last Reviewed: 5/1/2017 2000-2017 [employer]. [address] [place] [address]. All rights reserved. This information is not intended as a substitute for professional medical care. Always follow your healthcare professional's instructions.                Follow-ups after your visit        Your next 10 appointments already scheduled     May 16, 2018  8:50 AM CDT   Office Visit with Vonda Zaidi MD   [facility] ([facility])    [address]  [address]  [facility] [address] [phone]            " "  Who to contact     If you have questions or need follow up information about today's clinic visit or your schedule please contact [facility] directly at [phone].  Normal or non-critical lab and imaging results will be communicated to you by MyChart, letter or phone within 4 business days after the clinic has received the results. If you do not hear from us within 7 days, please contact the clinic through [employer] System[employer] or phone. If you have a critical or abnormal lab result, we will notify you by phone as soon as possible.  Submit refill requests through [employer] or call your pharmacy and they will forward the refill request to us. Please allow 3 business days for your refill to be completed.          Additional Information About Your Visit        [email or web address] Information     [email or web address] lets you send messages to your doctor, view your test results, renew your prescriptions, schedule appointments and more. To sign up, go to [email or web address] . Click on \"Log in\" on the left side of the screen, which will take you to the Welcome page. Then click on \"Sign up Now\" on the right side of the page.     You will be asked to enter the access code listed below, as well as some personal information. Please follow the directions to create your username and password.     Your access code is: [email or web address]  Expires: 2018  6:33 PM     Your access code will  in 90 days. If you need help or a new code, please call your [place] clinic or [phone].        Care EveryWhere ID     This is your Care EveryWhere ID. This could be used by other organizations to access your [place] medical records  [id number]        Your Vitals Were     Pulse Temperature Last Period Pulse Oximetry BMI (Body Mass Index)       80 99  F (37.2  C) (Tympanic) (LMP Unknown) 99% 22.31 kg/m2        Blood Pressure from Last 3 Encounters:   05/15/18 115/69   18 100/66   17 102/64    Weight from Last 3 Encounters:   05/15/18 120 " lb (54.4 kg)   01/20/18 120 lb (54.4 kg)   12/12/17 130 lb (59 kg)              We Performed the Following     Beta strep group A culture     Strep, Rapid Screen          Today's Medication Changes          These changes are accurate as of 5/15/18  6:33 PM.  If you have any questions, ask your nurse or doctor.               These medicines have changed or have updated prescriptions.        Dose/Directions    albuterol 108 (90 Base) MCG/ACT Inhaler   Commonly known as:  PROAIR HFA   This may have changed:    - when to take this  - reasons to take this   Used for:  Mild persistent asthma with exacerbation   Changed by:  Ilya Kurtz, NP        Dose:  2 puff   Inhale 2 puffs into the lungs every 4 hours as needed for shortness of breath / dyspnea or wheezing   Quantity:  1 Inhaler   Refills:  1            Where to get your medicines      These medications were sent to [facility] [address] [address] AT SEC of [facility] [address]  [address] [facility] [address] [address]     Phone:  [phone]     albuterol 108 (90 Base) MCG/ACT Inhaler    fluticasone 110 MCG/ACT Inhaler    montelukast 10 MG tablet    predniSONE 20 MG tablet                Primary Care Provider Office Phone # Fax #    Vonda Zaidi [phone] [address] [address]        Equal Access to Services     [facility] AH: Hadii aad ku hadasho Soomaali, waaxda luqadaha, qaybta kaalmada adeegyada, waxay chuck hayaan carlos a neri . So [facility] [phone].    ATENCIÓN: Si habla español, tiene a harrison disposición servicios gratuitos de asistencia lingüística. Estefanía al [phone].    We comply with applicable federal civil rights laws and [place] laws. We do not discriminate on the basis of race, color, national origin, age, disability, sex, sexual orientation, or gender identity.            Thank you!     Thank you for choosing [facility]  for your care. Our goal is always to provide you with excellent care. Hearing back from our patients is one way we can continue to improve our services. Please take a few minutes to complete the written survey that you may receive in the mail after your visit with us. Thank you!             Your Updated Medication List - Protect others around you: Learn how to safely use, store and throw away your medicines at www.disposemymeds.org.          This list is accurate as of 5/15/18  6:33 PM.  Always use your most recent med list.                   Brand Name Dispense Instructions for use Diagnosis    albuterol 108 (90 Base) MCG/ACT Inhaler    PROAIR HFA    1 Inhaler    Inhale 2 puffs into the lungs every 4 hours as needed for shortness of breath / dyspnea or wheezing    Mild persistent asthma with exacerbation       ALEVE PO           fluticasone 110 MCG/ACT Inhaler    FLOVENT HFA    1 Inhaler    Inhale 1 puff into the lungs 2 times daily During allergy season as needed    Mild persistent asthma with exacerbation       montelukast 10 MG tablet    SINGULAIR    30 tablet    Take 1 tablet (10 mg) by mouth At Bedtime    Mild persistent asthma with exacerbation       MULTIVITAMIN PO      Take  by mouth.    Routine gynecological examination, Diffuse cystic mastopathy       pantoprazole 40 MG EC tablet    PROTONIX    90 tablet    Take 1 tablet (40 mg) by mouth daily    Gastroesophageal reflux disease without esophagitis       predniSONE 20 MG tablet    DELTASONE    10 tablet    Take 2 tabs PO daily x 7 days    Mild persistent asthma with exacerbation       VITAMIN D3       Diffuse cystic mastopathy       VITAMIN E NATURAL PO

## 2018-05-15 NOTE — PATIENT INSTRUCTIONS
"  Asthma (Adult)  Asthma is a disease where the medium and  small air passages within the lung go into spasm and restrict the flow of air. Inflammation and swelling of the airways cause further blockage. During an acute asthma attack, these factors cause trouble breathing, wheezing, cough and chest tightness.    An asthma attack can be triggered by many things. Common triggers include infections such as the common cold, bronchitis, and pneumonia. Irritants such as smoke or pollutants in the air, very cold air, emotional upset, and exercise can also trigger an attack. In many adults with asthma, allergies to dust, mold, pollen and animal dander can cause an asthma attack. Skipping doses of daily asthma medicine can also bring on an asthma attack.  Asthma can be controlled using the proper medicines prescribed by your healthcare provider and avoiding exposure to known triggers including allergens and irritants.  Home care    Take prescribed medicine exactly at the times advised. If you need medicine such as from a hand held inhaler or aerosol breathing machine more than every 4 hours, contact your healthcare provider or seek immediate medical attention. If prescribed an antibiotic or prednisone, take all of the medicine as prescribed, even if you are feeling better after a few days.    Don't smoke. Avoid being exposed to the smoke of others.    Some people with asthma have worsening of their symptoms when they take aspirin and non-steroidal or fever-reducing medicines like ibuprofen and naproxen. Talk to your healthcare provider if you think this may apply to you.  Follow-up care  Follow up with your healthcare provider, or as advised. Always bring all of your current medicines to any appointments with your healthcare provider. Also bring a complete list of medicines even those not taken for asthma. If you don't already have one, talk to your healthcare provider about developing your own \"Asthma Action Plan.\"  A " pneumococcal (pneumonia) vaccine and yearly flu shot (every fall) are recommended. Ask your doctor about this.  When to seek medical advice  Call your healthcare provider right away if any of these occur:     Increased wheezing or shortness of breath    Need to use your inhalers more often than usual without relief    Fever of 100.4 F (38 C) or higher, or as directed by your healthcare provider    Coughing up lots of dark-colored or bloody sputum (mucus)    Chest pain with each breath    If you use a peak flow meter as part of an Asthma Action Plan, and you are still in the yellow zone (50% to 80%) 15 minutes after using inhaler medicine.  Call 911  Call 911 if any of the following occur    Trouble walking or talking because of shortness of breath    If you use a peak flow meter as part of an Asthma Action Plan and you are still in the red zone (less than 50%) 15 minutes after using inhaler medicine    Lips or fingernails turning gray or blue  Date Last Reviewed: 5/1/2017 2000-2017 [employer]. [address] [place] [address], [place] [address]. All rights reserved. This information is not intended as a substitute for professional medical care. Always follow your healthcare professional's instructions.

## 2018-05-15 NOTE — PROGRESS NOTES
SUBJECTIVE:                                                    Selene Lyn is a 53 year old female who presents to clinic today for the following health issues:    Asthma     Asthma:     Cough::  YES    Wheezing::  YES    Dyspnea::  YES    Use of rescue inhaler::  At least daily    Taking Asthma medication as prescribed::  Yes    Asthma triggers::  Upper respiratory infections and Pollens    ER/UC Visits or Admissions::  Yes    Frequency::  1    Symptoms started Friday evening and are worsening. H/o allergic asthma with seasonal exacerbations. Has ill niece at home with sore throat and URI symptoms. Also works with the elderly.    Problem list and histories reviewed & adjusted, as indicated.      Patient Active Problem List   Diagnosis     Mild intermittent asthma     Diffuse cystic mastopathy     Plantar fascial fibromatosis     Allergic rhinitis     Other chronic allergic conjunctivitis     ACP (advance care planning)     Health Care Home     Pain in joint, shoulder region     Past Surgical History:   Procedure Laterality Date     C NONSPECIFIC PROCEDURE      ablation of rentrant tachy loop       Social History   Substance Use Topics     Smoking status: Never Smoker     Smokeless tobacco: Never Used      Comment: exposed as a child but never smoked     Alcohol use 0.0 oz/week     0 Standard drinks or equivalent per week      Comment: one drink every 3 months/      Family History   Problem Relation Age of Onset     DIABETES Maternal Grandmother      CANCER Mother      lung cancer   69     DIABETES Sister 40     obesity     Breast Cancer No family hx of      cystic mastopathy     Cancer - colorectal No family hx of      C.A.D. No family hx of      Eye Disorder No family hx of      Depression No family hx of          Current Outpatient Prescriptions   Medication Sig Dispense Refill     albuterol (PROAIR HFA) 108 (90 Base) MCG/ACT Inhaler Inhale 2 puffs into the lungs every 4 hours as needed for shortness of  breath / dyspnea or wheezing 1 Inhaler 1     Cholecalciferol (VITAMIN D3)        fluticasone (FLOVENT HFA) 110 MCG/ACT Inhaler Inhale 1 puff into the lungs 2 times daily During allergy season as needed 1 Inhaler 1     montelukast (SINGULAIR) 10 MG tablet Take 1 tablet (10 mg) by mouth At Bedtime 30 tablet 1     Multiple Vitamin (MULTIVITAMIN OR) Take  by mouth.       Naproxen Sodium (ALEVE PO)        pantoprazole (PROTONIX) 40 MG EC tablet Take 1 tablet (40 mg) by mouth daily 90 tablet 3     predniSONE (DELTASONE) 20 MG tablet Take 2 tabs PO daily x 7 days 10 tablet 0     VITAMIN E NATURAL PO        [DISCONTINUED] albuterol (PROAIR HFA) 108 (90 BASE) MCG/ACT Inhaler Inhale 2 puffs into the lungs every 6 hours as needed for shortness of breath / dyspnea 1 Inhaler 3     [DISCONTINUED] fluticasone (FLOVENT HFA) 110 MCG/ACT Inhaler Inhale 1 puff into the lungs 2 times daily During allergy season as needed (Patient not taking: Reported on 1/20/2018) 3 Inhaler 0     [DISCONTINUED] montelukast (SINGULAIR) 10 MG tablet Take 1 tablet (10 mg) by mouth At Bedtime 30 tablet 2     Allergies   Allergen Reactions     Erythrocin Diarrhea     Penicillins      Sulfa Drugs        Review of Systems   Constitutional: Positive for chills (Sunday). Negative for fever.   HENT: Positive for congestion. Negative for sinus pain and sore throat. Ear pain: ear congestion.    Respiratory: Positive for cough, shortness of breath and wheezing. Negative for sputum production.    Gastrointestinal: Negative for abdominal pain, nausea and vomiting.   Neurological: Negative for dizziness and headaches.         OBJECTIVE:     /69 (BP Location: Right arm, Patient Position: Chair, Cuff Size: Adult Regular)  Pulse 80  Temp 99  F (37.2  C) (Tympanic)  Wt 120 lb (54.4 kg)  LMP  (LMP Unknown)  SpO2 99%  BMI 22.31 kg/m2  Body mass index is 22.31 kg/(m^2).  Physical Exam   Constitutional: She is oriented to person, place, and time. No distress.    HENT:   Head: Normocephalic.   Right Ear: External ear normal.   Left Ear: External ear normal.   Nose: Nose normal.   Mouth/Throat: Uvula is midline. Posterior oropharyngeal erythema present.   Eyes: Conjunctivae are normal.   Neck: Neck supple.   Cardiovascular: Normal rate, regular rhythm and normal heart sounds.    Pulmonary/Chest: Effort normal and breath sounds normal. She has no wheezes. She has no rales.   Lymphadenopathy:     She has cervical adenopathy.   Neurological: She is alert and oriented to person, place, and time.   Psychiatric: She has a normal mood and affect.         Diagnostic Test Results:  Results for orders placed or performed in visit on 05/15/18 (from the past 24 hour(s))   Strep, Rapid Screen   Result Value Ref Range    Specimen Description Throat     Rapid Strep A Screen       NEGATIVE: No Group A streptococcal antigen detected by immunoassay, await culture report.       ASSESSMENT/PLAN:       ICD-10-CM    1. Mild persistent asthma with exacerbation J45.31 albuterol (PROAIR HFA) 108 (90 Base) MCG/ACT Inhaler     fluticasone (FLOVENT HFA) 110 MCG/ACT Inhaler     montelukast (SINGULAIR) 10 MG tablet     predniSONE (DELTASONE) 20 MG tablet   2. Throat pain R07.0 Strep, Rapid Screen     Beta strep group A culture       Medical Decision Making:    Differential Diagnosis:  URI Adult/Peds:  Allergic rhinitis, Asthma exacerbation, Bronchitis-viral, Strep pharyngitis and Viral upper respiratory illness    Serious Comorbid Conditions:  Adult:  Asthma    PLAN:    URI Adult:  Ibuprofen, Fluids, Rest, Saline gargles, Rx Asthma exacerbation  Albuterol MDI, Prednisone, Flovent 44/100/220 and Singulair and Vaporizer    Followup:    If not improving or if condition worsens, follow up with your Primary Care Provider    [facility]

## 2018-05-16 LAB
BACTERIA SPEC CULT: NORMAL
SPECIMEN SOURCE: NORMAL

## 2018-07-06 ENCOUNTER — OFFICE VISIT (OUTPATIENT)
Dept: FAMILY MEDICINE | Facility: CLINIC | Age: 53
End: 2018-07-06

## 2018-07-06 VITALS
TEMPERATURE: 98.1 F | BODY MASS INDEX: 24.33 KG/M2 | SYSTOLIC BLOOD PRESSURE: 108 MMHG | WEIGHT: 132.2 LBS | OXYGEN SATURATION: 99 % | DIASTOLIC BLOOD PRESSURE: 68 MMHG | HEART RATE: 84 BPM | RESPIRATION RATE: 16 BRPM | HEIGHT: 62 IN

## 2018-07-06 DIAGNOSIS — J45.20 MILD INTERMITTENT ASTHMA WITHOUT COMPLICATION: ICD-10-CM

## 2018-07-06 DIAGNOSIS — J06.9 VIRAL UPPER RESPIRATORY TRACT INFECTION: ICD-10-CM

## 2018-07-06 DIAGNOSIS — R07.0 THROAT PAIN: Primary | ICD-10-CM

## 2018-07-06 LAB — S PYO AG THROAT QL IA.RAPID: NORMAL

## 2018-07-06 PROCEDURE — 99213 OFFICE O/P EST LOW 20 MIN: CPT | Performed by: FAMILY MEDICINE

## 2018-07-06 PROCEDURE — 87880 STREP A ASSAY W/OPTIC: CPT | Performed by: FAMILY MEDICINE

## 2018-07-06 PROCEDURE — 87070 CULTURE OTHR SPECIMN AEROBIC: CPT | Performed by: FAMILY MEDICINE

## 2018-07-06 RX ORDER — PREDNISONE 20 MG/1
20 TABLET ORAL DAILY
Qty: 5 TABLET | Refills: 0 | Status: SHIPPED | OUTPATIENT
Start: 2018-07-06 | End: 2018-07-18

## 2018-07-06 NOTE — PROGRESS NOTES
SUBJECTIVE: 53 year old female complaining of mild congestion followed by severe sore throat for 2 day(s).   The patient describes nasal congestion and cough followed with chest pressure. Stopped Flovent, Singulair and pro air for 2 months. Has been doing well without wheezing or SOB  The patient denies a history of fever, GI symptoms or rash.   Smoking history: No.   Relevant past medical history: positive for intermittent asthma not on medications now.    OBJECTIVE: The patient appears healthy, alert, no distress, cooperative, smiling and fatigued.   EARS: negative  NOSE/SINUS: positive findings: mucosa erythematous and swollen, clear rhinorrhea   THROAT: marked erythema, no tonsillar hypertrophy, no exudates present, throat culture taken, rapid strep done and post nasal drainage   NECK:negative findings: no asymmetry, masses, or scars, thyroid normal to palpation, positive findings: few small anterior cervical nodes   CHEST: Clear with dry cough. No rales or wheezing    ASSESSMENT: (R07.0) Throat pain  (primary encounter diagnosis)  Comment: await culture  Plan: RAPID STREP (BFP), THROAT CULTURE (BFP),         predniSONE (DELTASONE) 20 MG tablet        Symptomatic care with decongestants, fluids, tylenol/advil prn. Use GUAIFENESIN  MG OR TBCR, 1 tab po BID (Twice per day), D: 20, R: 0 for congestion and cough.    In addition, I have suggested that the patient   monitor for symptoms of bacterial infection expecting slow gradual resolution of viral URI as the natural course.  Follow Asthma action plan/ start Flovent    (J06.9,  B97.89) Viral upper respiratory tract infection  Plan: as above    (J45.20) Mild intermittent asthma without complication  Plan: predniSONE (DELTASONE) 20 MG tablet        Potential medication side effects were discussed with the patient; let me know if any occur.

## 2018-07-06 NOTE — PATIENT INSTRUCTIONS
await culture  Plan: RAPID STREP (BFP), THROAT CULTURE (BFP),         predniSONE (DELTASONE) 20 MG tablet        Symptomatic care with decongestants, fluids, tylenol/advil prn. Use GUAIFENESIN  MG OR TBCR, 1 tab po BID (Twice per day), D: 20, R: 0 for congestion and cough.    In addition, I have suggested that the patient   monitor for symptoms of bacterial infection expecting slow gradual resolution of viral URI as the natural course.  Follow Asthma action plan/ start Flovent  Potential medication side effects were discussed with the patient; let me know if any occur.

## 2018-07-06 NOTE — MR AVS SNAPSHOT
After Visit Summary   7/6/2018    Selene Lyn    MRN: [id number]           Patient Information     Date Of Birth          1965        Visit Information        Provider Department      7/6/2018 10:45 AM Vonda Zaidi MD [facility]        Today's Diagnoses     Throat pain    -  1    Viral upper respiratory tract infection        Mild intermittent asthma without complication          Care Instructions    await culture  Plan: RAPID STREP (BFP), THROAT CULTURE (BFP),         predniSONE (DELTASONE) 20 MG tablet        Symptomatic care with decongestants, fluids, tylenol/advil prn. Use GUAIFENESIN  MG OR TBCR, 1 tab po BID (Twice per day), D: 20, R: 0 for congestion and cough.    In addition, I have suggested that the patient   monitor for symptoms of bacterial infection expecting slow gradual resolution of viral URI as the natural course.  Follow Asthma action plan/ start Flovent  Potential medication side effects were discussed with the patient; let me know if any occur.            Follow-ups after your visit        Your next 10 appointments already scheduled     Jul 31, 2018  7:45 AM CDT   MA SCREENING DIGITAL BILATERAL with RHBCMA2   [facility]    [address], [address]  [facility] [address] [phone]           Do not use any powder, lotion or deodorant under your arms or on your breast. If you do, we will ask you to remove it before your exam.  Wear comfortable, two-piece clothing.  If you have any allergies, tell your care team.  Bring any previous mammograms from other facilities or have them mailed to the breast center. Three-dimensional (3D) mammograms are available at [place] locations in [facility], [facility], [facility], and [place]. Health locations include [facility] in [place]. Benefits of 3D mammograms include: - Improved  "rate of cancer detection - Decreases your chance of having to go back for more tests, which means fewer: - \"False-positive\" results (This means that there is an abnormal area but it isn't cancer.) - Invasive testing procedures, such as a biopsy or surgery - Can provide clearer images of the breast if you have dense breast tissue. 3D mammography is an optional exam that anyone can have with a 2D mammogram. It doesn't replace or take the place of a 2D mammogram. 2D mammograms remain an effective screening test for all women.  Not all insurance companies cover the cost of a 3D mammogram. Check with your insurance.              Who to contact     If you have questions or need follow up information about today's clinic visit or your schedule please contact [facility] directly at [phone].  Normal or non-critical lab and imaging results will be communicated to you by MyChart, letter or phone within 4 business days after the clinic has received the results. If you do not hear from us within 7 days, please contact the clinic through [employer] or phone. If you have a critical or abnormal lab result, we will notify you by phone as soon as possible.  Submit refill requests through [employer] or call your pharmacy and they will forward the refill request to us. Please allow 3 business days for your refill to be completed.          Additional Information About Your Visit        Care EveryWhere ID     This is your Care EveryWhere ID. This could be used by other organizations to access your [facility] medical records  [id number]        Your Vitals Were     Pulse Temperature Respirations Height Last Period Pulse Oximetry    84 98.1  F (36.7  C) (Oral) 16 1.562 m (5' 1.5\") (LMP Unknown) 99%    Breastfeeding? BMI (Body Mass Index)                No 24.57 kg/m2           Blood Pressure from Last 3 Encounters:   07/06/18 108/68   05/15/18 115/69   01/20/18 100/66    Weight from Last 3 Encounters:   07/06/18 60 kg " (132 lb 3.2 oz)   05/15/18 54.4 kg (120 lb)   01/20/18 54.4 kg (120 lb)              We Performed the Following     RAPID STREP (BFP)     THROAT CULTURE (BFP)          Today's Medication Changes          These changes are accurate as of 7/6/18 11:37 AM.  If you have any questions, ask your nurse or doctor.               Start taking these medicines.        Dose/Directions    predniSONE 20 MG tablet   Commonly known as:  DELTASONE   Used for:  Throat pain, Mild intermittent asthma without complication   Started by:  Vonda Ziadi MD        Dose:  20 mg   Take 1 tablet (20 mg) by mouth daily   Quantity:  5 tablet   Refills:  0            Where to get your medicines      These medications were sent to [facility] [place] [address]  [facility] [address] [facility] [address]     Phone:  [phone]     predniSONE 20 MG tablet                Primary Care Provider Office Phone # Fax #    Vonda Zaidi [phone] [address]  [address] [place] [address]        Equal Access to Services     [facility] : Hadii trinidad ku hadasho Soalexandruali, waaxda luqadaha, qaybta kaalmada adebrennayavirginia, ger neri . So [facility] [phone].    ATENCIÓN: Si habla español, tiene a harrison disposición servicios gratuitos de asistencia lingüística. [facility] [phone].    We comply with applicable federal civil rights laws and [place] laws. We do not discriminate on the basis of race, color, national origin, age, disability, sex, sexual orientation, or gender identity.            Thank you!     Thank you for choosing [facility]  for your care. Our goal is always to provide you with excellent care. Hearing back from our patients is one way we can continue to improve our services. Please take a few minutes to complete the written survey that you may receive in the mail after your visit with us. Thank you!             Your  Updated Medication List - Protect others around you: Learn how to safely use, store and throw away your medicines at www.disposemymeds.org.          This list is accurate as of 7/6/18 11:37 AM.  Always use your most recent med list.                   Brand Name Dispense Instructions for use Diagnosis    albuterol 108 (90 Base) MCG/ACT Inhaler    PROAIR HFA    1 Inhaler    Inhale 2 puffs into the lungs every 4 hours as needed for shortness of breath / dyspnea or wheezing    Mild persistent asthma with exacerbation       ALEVE PO           fluticasone 110 MCG/ACT Inhaler    FLOVENT HFA    1 Inhaler    Inhale 1 puff into the lungs 2 times daily During allergy season as needed    Mild persistent asthma with exacerbation       MULTIVITAMIN PO      Take  by mouth.    Routine gynecological examination, Diffuse cystic mastopathy       pantoprazole 40 MG EC tablet    PROTONIX    90 tablet    Take 1 tablet (40 mg) by mouth daily    Gastroesophageal reflux disease without esophagitis       predniSONE 20 MG tablet    DELTASONE    5 tablet    Take 1 tablet (20 mg) by mouth daily    Throat pain, Mild intermittent asthma without complication       VITAMIN D3       Diffuse cystic mastopathy       VITAMIN E NATURAL PO

## 2018-07-06 NOTE — NURSING NOTE
June is here for sore throat began wednesday, woke up with sore throat and congestion, has happened 4 times like this since December with similar sx, pt also has had chills off and on since Wednesday    Pre-Visit Screening :  Immunizations : up to date    Colonoscopy : is up to date  Mammogram : is up to date  Asthma Action Test/Plan : chase  PHQ9/GAD7 :  Na    Pulse - regular  My Chart - declines    CLASSIFICATION OF OVERWEIGHT AND OBESITY BY BMI                         Obesity Class           BMI(kg/m2)  Underweight                                    < 18.5  Normal                                         18.5-24.9  Overweight                                     25.0-29.9  OBESITY                     I                  30.0-34.9                              II                 35.0-39.9  EXTREME OBESITY             III                >40                             Patient's  BMI Body mass index is 22.15 kg/(m^2).  http://hin.nhlbi.nih.gov/menuplanner/menu.cgi  Questioned patient about current smoking habits.  Pt. has never smoked.  The patient has verbalized that it is ok to leave a detailed voice message on the patient's cell phone with results/recommendations from this visit.       Verified [phone]  phone number:

## 2018-07-09 LAB — THROAT CULTURE: NORMAL

## 2018-07-18 ENCOUNTER — OFFICE VISIT (OUTPATIENT)
Dept: FAMILY MEDICINE | Facility: CLINIC | Age: 53
End: 2018-07-18

## 2018-07-18 VITALS
BODY MASS INDEX: 23.85 KG/M2 | WEIGHT: 129.6 LBS | OXYGEN SATURATION: 98 % | TEMPERATURE: 98.7 F | DIASTOLIC BLOOD PRESSURE: 60 MMHG | HEART RATE: 82 BPM | RESPIRATION RATE: 12 BRPM | SYSTOLIC BLOOD PRESSURE: 102 MMHG | HEIGHT: 62 IN

## 2018-07-18 DIAGNOSIS — S33.5XXA LUMBAR SPRAIN, INITIAL ENCOUNTER: Primary | ICD-10-CM

## 2018-07-18 PROCEDURE — 99213 OFFICE O/P EST LOW 20 MIN: CPT | Performed by: FAMILY MEDICINE

## 2018-07-18 RX ORDER — CYCLOBENZAPRINE HCL 10 MG
5-10 TABLET ORAL 3 TIMES DAILY PRN
Qty: 30 TABLET | Refills: 1 | Status: SHIPPED | OUTPATIENT
Start: 2018-07-18 | End: 2018-10-09

## 2018-07-18 NOTE — PATIENT INSTRUCTIONS
Ice  Ibuprofen    Rehab stretches/ exercises to begin immediately and given in writing with illustrations.  Use flexeril as needed  Follow handout

## 2018-07-18 NOTE — MR AVS SNAPSHOT
"              After Visit Summary   7/18/2018    Selene Lyn    MRN: [id number]           Patient Information     Date Of Birth          1965        Visit Information        Provider Department      7/18/2018 12:45 PM Vonda Zaidi MD [facility]        Today's Diagnoses     Lumbar sprain, initial encounter    -  1      Care Instructions    Ice  Ibuprofen    Rehab stretches/ exercises to begin immediately and given in writing with illustrations.  Use flexeril as needed  Follow handout          Follow-ups after your visit        Your next 10 appointments already scheduled     Jul 31, 2018  7:45 AM CDT   MA SCREENING DIGITAL BILATERAL with RHBCMA2   [facility]    [address]  [facility] [address] [phone]           Do not use any powder, lotion or deodorant under your arms or on your breast. If you do, we will ask you to remove it before your exam.  Wear comfortable, two-piece clothing.  If you have any allergies, tell your care team.  Bring any previous mammograms from other facilities or have them mailed to the breast center. Three-dimensional (3D) mammograms are available at [place] locations in [place], [place], [place], [place], [facility], [place], [place], and [place]. [facility] locations include [place] and [facility] [place] [facility] in [place]. Benefits of 3D mammograms include: - Improved rate of cancer detection - Decreases your chance of having to go back for more tests, which means fewer: - \"False-positive\" results (This means that there is an abnormal area but it isn't cancer.) - Invasive testing procedures, such as a biopsy or surgery - Can provide clearer images of the breast if you have dense breast tissue. 3D mammography is an optional exam that anyone can have with a 2D mammogram. It doesn't replace or take the place of a 2D mammogram. 2D mammograms remain an effective screening test " "for all women.  Not all insurance companies cover the cost of a 3D mammogram. Check with your insurance.              Who to contact     If you have questions or need follow up information about today's clinic visit or your schedule please contact [facility] directly at [phone].  Normal or non-critical lab and imaging results will be communicated to you by MyChart, letter or phone within 4 business days after the clinic has received the results. If you do not hear from us within 7 days, please contact the clinic through MyChart or phone. If you have a critical or abnormal lab result, we will notify you by phone as soon as possible.  Submit refill requests through [employer] or call your pharmacy and they will forward the refill request to us. Please allow 3 business days for your refill to be completed.          Additional Information About Your Visit        Care EveryWhere ID     This is your Care EveryWhere ID. This could be used by other organizations to access your [facility] medical records  [id number]        Your Vitals Were     Pulse Temperature Respirations Height Last Period Pulse Oximetry    82 98.7  F (37.1  C) (Oral) 12 1.562 m (5' 1.5\") (LMP Unknown) 98%    BMI (Body Mass Index)                   24.09 kg/m2            Blood Pressure from Last 3 Encounters:   07/18/18 102/60   07/06/18 108/68   05/15/18 115/69    Weight from Last 3 Encounters:   07/18/18 58.8 kg (129 lb 9.6 oz)   07/06/18 60 kg (132 lb 3.2 oz)   05/15/18 54.4 kg (120 lb)              Today, you had the following     No orders found for display         Today's Medication Changes          These changes are accurate as of 7/18/18  1:50 PM.  If you have any questions, ask your nurse or doctor.               Start taking these medicines.        Dose/Directions    cyclobenzaprine 10 MG tablet   Commonly known as:  FLEXERIL   Used for:  Lumbar sprain, initial encounter   Started by:  Vonda Zaidi MD        Dose:  " 5-10 mg   Take 0.5-1 tablets (5-10 mg) by mouth 3 times daily as needed for muscle spasms   Quantity:  30 tablet   Refills:  1            Where to get your medicines      These medications were sent to [facility] [place], [place] - [address]  [address] [facility] [address]     Phone:  [phone]     cyclobenzaprine 10 MG tablet                Primary Care Provider Office Phone # Fax #    Vonda Zaidi [phone]       [address]  [facility] [address]        Equal Access to Services     [facility] : Hadii aad ku hadasho Soalexandruali, waaxda luqadaha, qaybta kaalmada adeegyada, waxadam neri . So [facility] [place] [phone].    ATENCIÓN: Si habla español, tiene a harrison disposición servicios gratuitos de asistencia lingüística. [facility] [phone].    We comply with applicable federal civil rights laws and [place] laws. We do not discriminate on the basis of race, color, national origin, age, disability, sex, sexual orientation, or gender identity.            Thank you!     Thank you for choosing [facility]  for your care. Our goal is always to provide you with excellent care. Hearing back from our patients is one way we can continue to improve our services. Please take a few minutes to complete the written survey that you may receive in the mail after your visit with us. Thank you!             Your Updated Medication List - Protect others around you: Learn how to safely use, store and throw away your medicines at www.disposemymeds.org.          This list is accurate as of 7/18/18  1:50 PM.  Always use your most recent med list.                   Brand Name Dispense Instructions for use Diagnosis    albuterol 108 (90 Base) MCG/ACT Inhaler    PROAIR HFA    1 Inhaler    Inhale 2 puffs into the lungs every 4 hours as needed for shortness of breath / dyspnea or wheezing    Mild persistent asthma with  exacerbation       ALEVE PO           cyclobenzaprine 10 MG tablet    FLEXERIL    30 tablet    Take 0.5-1 tablets (5-10 mg) by mouth 3 times daily as needed for muscle spasms    Lumbar sprain, initial encounter       fluticasone 110 MCG/ACT Inhaler    FLOVENT HFA    1 Inhaler    Inhale 1 puff into the lungs 2 times daily During allergy season as needed    Mild persistent asthma with exacerbation       MULTIVITAMIN PO      Take  by mouth.    Routine gynecological examination, Diffuse cystic mastopathy       pantoprazole 40 MG EC tablet    PROTONIX    90 tablet    Take 1 tablet (40 mg) by mouth daily    Gastroesophageal reflux disease without esophagitis       VITAMIN D3       Diffuse cystic mastopathy       VITAMIN E NATURAL PO

## 2018-07-18 NOTE — NURSING NOTE
June is here for back pain, began last night when golfing      Pre-visit Screening:  Immunizations:  up to date  Colonoscopy:  is up to date  Mammogram: is due and appointment is 7/31  Asthma Action Test/Plan:  Yes since child  PHQ9:  none  GAD7:  none  Questioned patient about current smoking habits Pt. has never smoked.  Ok to leave detailed message on voice mail for today's visit only Yes, phone # [phone]

## 2018-07-18 NOTE — LETTER
July 18, 2018      Selene Lyn  [address] [place] [id number]        To Whom It May Concern:    Selene Lyn  was seen on 7/18/2018.  Please excuse her  until 7/20/2018 due to injury.        Sincerely,        Vonda Zaidi MD

## 2018-07-18 NOTE — PROGRESS NOTES
SUBJECTIVE:   The patients complains of low back pain for 1 day, positional with bending or lifting, without radiation down the legs. Precipitating factors: hitting golf balls off a mat/ started slowly to tighten up.  Prior history of back problems: previous sprain 12 years ago. There is no numbness/tingling in the right or left leg.    OBJECTIVE:  Appears well, in no apparent distress.  Vital signs are normal. Lumbo-sacral spine area reveals no local tenderness or mass.  Painful and reduced LS ROM noted. Straight leg raise is negative at 90 degrees on right and left. DTR's, motor strength and sensation normal, including heel and toe gait.  Peripheral pulses are palpable. Lumbar spine X-Ray are not indicated.    ASSESSMENT:   Lumbar strain      PLAN:  Discussed treatment plan of prn NSAID's and back care exercise program, given in writing. Flexeril prn. Potential medication side effects were discussed with the patient; let me know if any occur.   Intermittent rest, proper lifting with avoidance of heavy lifting and intermittent use of heat discussed - avoid sleeping on a heating pad. Consider Physical Therapy and XRay studies if not improving. Call or return to clinic prn if these symptoms worsen or fail to improve as anticipated.

## 2018-07-31 ENCOUNTER — HOSPITAL ENCOUNTER (OUTPATIENT)
Dept: MAMMOGRAPHY | Facility: CLINIC | Age: 53
Discharge: HOME | End: 2018-07-31
Attending: FAMILY MEDICINE | Admitting: FAMILY MEDICINE
Payer: COMMERCIAL

## 2018-07-31 DIAGNOSIS — Z12.31 VISIT FOR SCREENING MAMMOGRAM: ICD-10-CM

## 2018-07-31 PROCEDURE — 77063 BREAST TOMOSYNTHESIS BI: CPT

## 2018-09-11 ENCOUNTER — OFFICE VISIT (OUTPATIENT)
Dept: FAMILY MEDICINE | Facility: CLINIC | Age: 53
End: 2018-09-11

## 2018-09-11 VITALS
RESPIRATION RATE: 16 BRPM | HEART RATE: 82 BPM | BODY MASS INDEX: 24.62 KG/M2 | SYSTOLIC BLOOD PRESSURE: 96 MMHG | HEIGHT: 61 IN | WEIGHT: 130.4 LBS | OXYGEN SATURATION: 96 % | TEMPERATURE: 98.5 F | DIASTOLIC BLOOD PRESSURE: 70 MMHG

## 2018-09-11 DIAGNOSIS — R20.9 DISTURBANCE OF SKIN SENSATION: Primary | ICD-10-CM

## 2018-09-11 LAB
% GRANULOCYTES: 58.5 %
ERYTHROCYTE [SEDIMENTATION RATE] IN BLOOD: 9 MM/HR (ref 0–20)
HBA1C MFR BLD: 5.3 % (ref 4–7)
HCT VFR BLD AUTO: 41.5 % (ref 35–47)
HEMOGLOBIN: 13.2 G/DL (ref 11.7–15.7)
LYMPHOCYTES NFR BLD AUTO: 32.7 %
MCH RBC QN AUTO: 28 PG (ref 26–33)
MCHC RBC AUTO-ENTMCNC: 31.8 G/DL (ref 31–36)
MCV RBC AUTO: 87.9 FL (ref 78–100)
MONOCYTES NFR BLD AUTO: 8.8 %
PLATELET COUNT - QUEST: 208 10^9/L (ref 150–375)
RBC # BLD AUTO: 4.72 10*12/L (ref 3.8–5.2)
WBC # BLD AUTO: 6.8 10*9/L (ref 4–11)

## 2018-09-11 PROCEDURE — 83036 HEMOGLOBIN GLYCOSYLATED A1C: CPT | Performed by: FAMILY MEDICINE

## 2018-09-11 PROCEDURE — 99214 OFFICE O/P EST MOD 30 MIN: CPT | Performed by: FAMILY MEDICINE

## 2018-09-11 PROCEDURE — 86618 LYME DISEASE ANTIBODY: CPT | Mod: 90 | Performed by: FAMILY MEDICINE

## 2018-09-11 PROCEDURE — 36415 COLL VENOUS BLD VENIPUNCTURE: CPT | Performed by: FAMILY MEDICINE

## 2018-09-11 PROCEDURE — 82607 VITAMIN B-12: CPT | Mod: 90 | Performed by: FAMILY MEDICINE

## 2018-09-11 PROCEDURE — 85025 COMPLETE CBC W/AUTO DIFF WBC: CPT | Performed by: FAMILY MEDICINE

## 2018-09-11 PROCEDURE — 85651 RBC SED RATE NONAUTOMATED: CPT | Performed by: FAMILY MEDICINE

## 2018-09-11 PROCEDURE — 84443 ASSAY THYROID STIM HORMONE: CPT | Mod: 90 | Performed by: FAMILY MEDICINE

## 2018-09-11 NOTE — NURSING NOTE
Ankle/Brachial Index (THOMAS)    Right - 1.08  Left - 1.15    Overall THOMAS: 1.08    Interpretation  0.96 or Above -  Normal  0.71 - 0.95      -  Mild Obstruction  0.31 - 0.70      -  Moderate Obstruction  0.00 - 0.30      -  Severe Obstruction      Karmen Rea, CMA

## 2018-09-11 NOTE — NURSING NOTE
Selene Lyn is here today for a Burning sensation on the bottom of both feet, on and off for the past 2 weeks.    Pre-visit Screening:    Immunizations:  up to date - Patient will have Flu Shot done at her work  Colonoscopy:  is up to date  Mammogram: is up to date  Asthma Action Test/Plan:  is completed today  PHQ-2 Score:     PHQ-2 ( 1999 [employer]) 9/11/2018 9/11/2017   Q1: Little interest or pleasure in doing things 0 0   Q2: Feeling down, depressed or hopeless 0 0   PHQ-2 Score 0 0       GAD7:  NA    Questioned patient about current smoking habits Pt. has never smoked.    Is it ok to leave a detailed message on cell phone's voice mail for today's visit only? Yes     Telephone Information:   Mobile [phone]         Karmen Rea [facility]

## 2018-09-11 NOTE — MR AVS SNAPSHOT
After Visit Summary   9/11/2018    Selene Lyn    MRN: [id number]           Patient Information     Date Of Birth          1965        Visit Information        Provider Department      9/11/2018 5:15 PM Vonda Zaidi MD [facility]        Today's Diagnoses     Disturbance of skin sensation    -  1      Care Instructions    Await labs    Schedule EMG and NEUROLOGY consultation    Protect feet            Follow-ups after your visit        Additional Services     NEUROLOGY ADULT REFERRAL       Your provider has referred you for the following:   Consult at [facility]: [facility] [phone]   [email or web address]    Please be aware that coverage of these services is subject to the terms and limitations of your health insurance plan.  Call member services at your health plan with any benefit or coverage questions.      Please bring the following with you to your appointment:    (1) Any X-Rays, CTs or MRIs which have been performed.  Contact the facility where they were done to arrange for  prior to your scheduled appointment.    (2) List of current medications  (3) This referral request   (4) Any documents/labs given to you for this referral                  Your next 10 appointments already scheduled     Oct 09, 2018  5:30 PM CDT   Physical with Vonda Zaidi MD   [facility] ([facility])    [address]  [address]  [facility] [address] [phone]              Who to contact     If you have questions or need follow up information about today's clinic visit or your schedule please contact [facility] directly at [phone].  Normal or non-critical lab and imaging results will be communicated to you by MyChart, letter or phone within 4 business days after the clinic has received the results. If you do not hear from us within 7  "days, please contact the clinic through [employer] or phone. If you have a critical or abnormal lab result, we will notify you by phone as soon as possible.  Submit refill requests through [employer] or call your pharmacy and they will forward the refill request to us. Please allow 3 business days for your refill to be completed.          Additional Information About Your Visit        Care EveryWhere ID     This is your Care EveryWhere ID. This could be used by other organizations to access your [place] medical records  [id number]        Your Vitals Were     Pulse Temperature Respirations Height Last Period Pulse Oximetry    82 98.5  F (36.9  C) (Oral) 16 1.549 m (5' 1\") (LMP Unknown) 96%    Breastfeeding? BMI (Body Mass Index)                No 24.64 kg/m2           Blood Pressure from Last 3 Encounters:   09/11/18 96/70   07/18/18 102/60   07/06/18 108/68    Weight from Last 3 Encounters:   09/11/18 59.1 kg (130 lb 6.4 oz)   07/18/18 58.8 kg (129 lb 9.6 oz)   07/06/18 60 kg (132 lb 3.2 oz)              We Performed the Following     CL AFF HEMOGRAM/PLATE/DIFF (BFP)     ESR, WESTERGREN (BFP)     Hemoglobin A1c (BFP)     Lymes Antibodies Total ([facility])     NEUROLOGY ADULT REFERRAL     TSH with free T4 reflex ([facility])     VENOUS COLLECTION     VITAMIN B12 ([facility])        Primary Care Provider Office Phone # Fax #    Vonda Zaidi [phone] [address] [address]        Equal Access to Services     SMITH STANLEY : Hadii aad ku hadasho Soomaali, waaxda luqadaha, qaybta kaalmada adeegyavirginia, ger lynn. So [facility] [phone].    ATENCIÓN: Si habla español, tiene a harrison disposición servicios gratuitos de asistencia lingüística. Llame al [phone].    We comply with applicable federal civil rights laws and [place] laws. We do not discriminate on the basis of race, color, national origin, age, disability, sex, sexual orientation, or gender " identity.            Thank you!     Thank you for choosing [facility]  for your care. Our goal is always to provide you with excellent care. Hearing back from our patients is one way we can continue to improve our services. Please take a few minutes to complete the written survey that you may receive in the mail after your visit with us. Thank you!             Your Updated Medication List - Protect others around you: Learn how to safely use, store and throw away your medicines at www.disposemymeds.org.          This list is accurate as of 9/11/18 11:59 PM.  Always use your most recent med list.                   Brand Name Dispense Instructions for use Diagnosis    albuterol 108 (90 Base) MCG/ACT inhaler    PROAIR HFA    1 Inhaler    Inhale 2 puffs into the lungs every 4 hours as needed for shortness of breath / dyspnea or wheezing    Mild persistent asthma with exacerbation       ALEVE PO           cyclobenzaprine 10 MG tablet    FLEXERIL    30 tablet    Take 0.5-1 tablets (5-10 mg) by mouth 3 times daily as needed for muscle spasms    Lumbar sprain, initial encounter       fluticasone 110 MCG/ACT Inhaler    FLOVENT HFA    1 Inhaler    Inhale 1 puff into the lungs 2 times daily During allergy season as needed    Mild persistent asthma with exacerbation       MULTIVITAMIN PO      Take  by mouth.    Routine gynecological examination, Diffuse cystic mastopathy       pantoprazole 40 MG EC tablet    PROTONIX    90 tablet    Take 1 tablet (40 mg) by mouth daily    Gastroesophageal reflux disease without esophagitis       VITAMIN D3       Diffuse cystic mastopathy       VITAMIN E NATURAL PO

## 2018-09-11 NOTE — LETTER
September 17, 2018      Selene Lyn  [address]  [place] [address]        Dear ,    We are writing to inform you of your test results.    Your test results fall within the expected range(s).   Please continue with current treatment plan.    Resulted Orders   ESR, WESTERGREN (BFP)   Result Value Ref Range    Sed Rate 9 0 - 20 mm/hr   CL AFF HEMOGRAM/PLATE/DIFF (BFP)   Result Value Ref Range    WBC 6.8 4.0 - 11 10*9/L    RBC Count 4.72 3.8 - 5.2 10*12/L    Hemoglobin 13.2 11.7 - 15.7 g/dL    Hematocrit 41.5 35.0 - 47.0 %    MCV 87.9 78 - 100 fL    MCH 28.0 26 - 33 pg    MCHC 31.8 31 - 36 g/dL    Platelet Count 208 150 - 375 10^9/L    % Granulocytes 58.5 %    % Lymphocytes 32.7 %    % Monocytes 8.8 %   VITAMIN B12 ([facility])   Result Value Ref Range    Vitamin B12 638 200 - 1100 pg/mL   Lymes Antibodies Total ([facility])   Result Value Ref Range    Lyme Screen IgG and IgM <0.90 index      Comment:                         Index                Interpretation                     -----                --------------                     < 0.90               Negative                     0.90-1.09            Equivocal                     > 1.09               Positive      As recommended by the Food and Drug Administration   (FDA), all samples with positive or equivocal   results in a Borrelia burgdorferi antibody screen  will be tested using a blot method. Positive or   equivocal screening test results should not be   interpreted as truly positive until verified as such   using a supplemental assay (e.g., B. burgdorferi blot).     The screening test and/or blot for B. burgdorferi   antibodies may be falsely negative in early stages  of Lyme disease, including the period when erythema   migrans is apparent.        TSH with free T4 reflex ([facility])   Result Value Ref Range    TSH 1.42 mIU/L      Comment:                Reference Range                         > or = 20 Years  0.40-4.50                               Pregnancy Ranges            First trimester    0.26-2.66            Second trimester   0.55-2.73            Third trimester    0.43-2.91     Hemoglobin A1c (BFP)   Result Value Ref Range    Hemoglobin A1C 5.3 4.0 - 7.0 %       If you have any questions or concerns, please call the clinic at the number listed above.       Sincerely,        Vonda Zaidi MD

## 2018-09-11 NOTE — PROGRESS NOTES
SUBJECTIVE: 53 year old female complaining of burning hot feet for 2 week(s).   The patient describes not really painful but irritating. Feel it all the time.  Thought she had plantar fasciitis with some heel pain upon awakening that resolves pretty quickly ( minutes) with walking. Burning does not change with activity or rest.  The patient denies a history of medication changes, joint aches, insect bites or skin changes   Smoking history: No.   Relevant past medical history: positive for intermittent asthma, allergies.    OBJECTIVE: The patient appears healthy, alert, no distress, cooperative and smiling.   EARS: negative  NOSE/SINUS: Nares normal. Septum midline. Mucosa normal. No drainage or sinus tenderness.   THROAT: normal   NECK:Neck supple. No adenopathy. Thyroid symmetric, normal size,, Carotids without bruits.   CHEST: Regular rate and  rhythm. S1 and S2 normal, no murmurs, clicks, gallops or rubs. No edema or JVD. Chest is clear; no wheezes or rales.  EXT: The lower extremities are normal and reveal no sign of DVT. Calves and thighs are soft and non tender, color is normal, no swelling or redness. Rk's sign is negative.  Pedal pulses are normal.  Foot and ankle exam is normal. Color and temperature of the feet is normal.    2 point discrimination intact/ monofilament WNL    Sed rate:9  CBC: WNL  A1C:5.3    THOMAS: WNL      ASSESSMENT: (R20.9) Disturbance of skin sensation  (primary encounter diagnosis)  Comment: scehdule EMG followed by neurology consult/ subjective change in sensation reviewed  Plan: ESR, WESTERGREN (BFP), CL AFF         HEMOGRAM/PLATE/DIFF (BFP), VITAMIN B12 ([facility]),        Lymes Antibodies Total ([facility]), TSH with free         T4 reflex ([facility]), VENOUS COLLECTION, NEUROLOGY        ADULT REFERRAL, CANCELED: NEUROLOGY ADULT         REFERRAL        Protect feet. Prn tylenol. Socks and shoes. Await labs.

## 2018-09-12 ASSESSMENT — ASTHMA QUESTIONNAIRES: ACT_TOTALSCORE: 25

## 2018-09-13 LAB
LYME SCREEN IGG AND IGM: <0.9 INDEX
TSH SERPL-ACNC: 1.42 MIU/L
VIT B12 SERPL-MCNC: 638 PG/ML (ref 200–1100)

## 2018-10-05 ENCOUNTER — TRANSFERRED RECORDS (OUTPATIENT)
Dept: FAMILY MEDICINE | Facility: CLINIC | Age: 53
End: 2018-10-05

## 2018-10-09 ENCOUNTER — OFFICE VISIT (OUTPATIENT)
Dept: FAMILY MEDICINE | Facility: CLINIC | Age: 53
End: 2018-10-09

## 2018-10-09 VITALS
DIASTOLIC BLOOD PRESSURE: 68 MMHG | BODY MASS INDEX: 24.81 KG/M2 | HEART RATE: 73 BPM | WEIGHT: 131.4 LBS | OXYGEN SATURATION: 98 % | RESPIRATION RATE: 16 BRPM | HEIGHT: 61 IN | SYSTOLIC BLOOD PRESSURE: 98 MMHG | TEMPERATURE: 98.1 F

## 2018-10-09 DIAGNOSIS — Z01.419 ENCOUNTER FOR GYNECOLOGICAL EXAMINATION WITHOUT ABNORMAL FINDING: Primary | ICD-10-CM

## 2018-10-09 DIAGNOSIS — J45.31 MILD PERSISTENT ASTHMA WITH EXACERBATION: ICD-10-CM

## 2018-10-09 DIAGNOSIS — Z23 NEED FOR VACCINATION: ICD-10-CM

## 2018-10-09 LAB — HEMOGLOBIN: 13.9 G/DL (ref 11.7–15.7)

## 2018-10-09 PROCEDURE — 90686 IIV4 VACC NO PRSV 0.5 ML IM: CPT | Performed by: FAMILY MEDICINE

## 2018-10-09 PROCEDURE — 36415 COLL VENOUS BLD VENIPUNCTURE: CPT | Performed by: FAMILY MEDICINE

## 2018-10-09 PROCEDURE — 99396 PREV VISIT EST AGE 40-64: CPT | Mod: 25 | Performed by: FAMILY MEDICINE

## 2018-10-09 PROCEDURE — 90471 IMMUNIZATION ADMIN: CPT | Performed by: FAMILY MEDICINE

## 2018-10-09 PROCEDURE — 85018 HEMOGLOBIN: CPT | Performed by: FAMILY MEDICINE

## 2018-10-09 RX ORDER — FLUTICASONE PROPIONATE 110 UG/1
1 AEROSOL, METERED RESPIRATORY (INHALATION) 2 TIMES DAILY
Qty: 3 INHALER | Refills: 3 | Status: SHIPPED | OUTPATIENT
Start: 2018-10-09 | End: 2020-01-07

## 2018-10-09 RX ORDER — ALBUTEROL SULFATE 90 UG/1
2 AEROSOL, METERED RESPIRATORY (INHALATION) EVERY 4 HOURS PRN
Qty: 2 INHALER | Refills: 1 | Status: SHIPPED | OUTPATIENT
Start: 2018-10-09 | End: 2020-01-07

## 2018-10-09 NOTE — PATIENT INSTRUCTIONS
Total calcium intake of 1500 mgm/day, vitamin D 1000IU/day and a regular weight bearing exercise program for prevention of osteoporosis is recommended treatment at this time.  Vitamin B12 1000 mgm      HGB 13.9 ( nl 11.5-16.5)    Follow asthma action plan

## 2018-10-09 NOTE — MR AVS SNAPSHOT
"              After Visit Summary   10/9/2018    Selene Lyn    MRN: [id number]           Patient Information     Date Of Birth          1965        Visit Information        Provider Department      10/9/2018 5:30 PM Vonda Zaidi MD [facility]        Today's Diagnoses     Encounter for gynecological examination without abnormal finding    -  1    Mild persistent asthma with exacerbation        Need for vaccination          Care Instructions    Total calcium intake of 1500 mgm/day, vitamin D 1000IU/day and a regular weight bearing exercise program for prevention of osteoporosis is recommended treatment at this time.  Vitamin B12 1000 mgm      HGB 13.9 ( nl 11.5-16.5)    Follow asthma action plan                Follow-ups after your visit        Who to contact     If you have questions or need follow up information about today's clinic visit or your schedule please contact [facility] directly at [phone].  Normal or non-critical lab and imaging results will be communicated to you by MyChart, letter or phone within 4 business days after the clinic has received the results. If you do not hear from us within 7 days, please contact the clinic through [employer] or phone. If you have a critical or abnormal lab result, we will notify you by phone as soon as possible.  Submit refill requests through [employer] or call your pharmacy and they will forward the refill request to us. Please allow 3 business days for your refill to be completed.          Additional Information About Your Visit        MyChart Information     [employer] lets you send messages to your doctor, view your test results, renew your prescriptions, schedule appointments and more. To sign up, go to [email or web address] . Click on \"Log in\" on the left side of the screen, which will take you to the Welcome page. Then click on \"Sign up Now\" on the right side of the page.     You will be asked to enter the " "access code listed below, as well as some personal information. Please follow the directions to create your username and password.     Your access code is: [email or web address]  Expires: 2019  6:27 PM     Your access code will  in 90 days. If you need help or a new code, please call your [place] clinic or [phone].        Care EveryWhere ID     This is your Care EveryWhere ID. This could be used by other organizations to access your [place] medical records  [id number]        Your Vitals Were     Pulse Temperature Respirations Height Last Period Pulse Oximetry    73 98.1  F (36.7  C) (Oral) 16 1.549 m (5' 1\") (LMP Unknown) 98%    BMI (Body Mass Index)                   24.83 kg/m2            Blood Pressure from Last 3 Encounters:   10/09/18 98/68   18 96/70   18 102/60    Weight from Last 3 Encounters:   10/09/18 59.6 kg (131 lb 6.4 oz)   18 59.1 kg (130 lb 6.4 oz)   18 58.8 kg (129 lb 9.6 oz)              We Performed the Following     Asthma Action Plan (AAP)     CL AFF HEMOGLOBIN (BFP)     HC FLU VAC PRESRV FREE QUAD SPLIT VIR 3+YRS IM     VACCINE ADMINISTRATION, INITIAL     VENOUS COLLECTION          Where to get your medicines      These medications were sent to [facility] [place], [place] - [address]  [address] [facility] [address]     Phone:  [phone]     albuterol 108 (90 Base) MCG/ACT inhaler    fluticasone 110 MCG/ACT Inhaler          Primary Care Provider Office Phone # Fax #    Vonda Zaidi [phone] [address]  [facility] [address]        Equal Access to Services     SMITH STANLEY : David Duvall, wadavionda luqadaha, qaybta kaalmada adan, ger lynn. [facility] [phone].    ATENCIÓN: Si habla español, tiene a harrison disposición servicios gratuitos de asistencia lingüística. Llame al [phone].    We comply with applicable federal civil " rights laws and [place] laws. We do not discriminate on the basis of race, color, national origin, age, disability, sex, sexual orientation, or gender identity.            Thank you!     Thank you for choosing [facility]  for your care. Our goal is always to provide you with excellent care. Hearing back from our patients is one way we can continue to improve our services. Please take a few minutes to complete the written survey that you may receive in the mail after your visit with us. Thank you!             Your Updated Medication List - Protect others around you: Learn how to safely use, store and throw away your medicines at www.disposemymeds.org.          This list is accurate as of 10/9/18  6:27 PM.  Always use your most recent med list.                   Brand Name Dispense Instructions for use Diagnosis    albuterol 108 (90 Base) MCG/ACT inhaler    PROAIR HFA    2 Inhaler    Inhale 2 puffs into the lungs every 4 hours as needed for shortness of breath / dyspnea or wheezing    Mild persistent asthma with exacerbation       ALEVE PO           fluticasone 110 MCG/ACT Inhaler    FLOVENT HFA    3 Inhaler    Inhale 1 puff into the lungs 2 times daily During allergy season as needed    Mild persistent asthma with exacerbation       MULTIVITAMIN PO      Take  by mouth.    Routine gynecological examination, Diffuse cystic mastopathy       pantoprazole 40 MG EC tablet    PROTONIX    90 tablet    Take 1 tablet (40 mg) by mouth daily    Gastroesophageal reflux disease without esophagitis       VITAMIN D3       Diffuse cystic mastopathy       VITAMIN E NATURAL PO

## 2018-10-09 NOTE — PROGRESS NOTES
SUBJECTIVE:  Selene Lyn is an 53 year old  postmenopausal woman   who presents for annual gyn exam. Menopause at age 50. No   bleeding, spotting, or discharge noted.     Estrogen replacement therapy: never  MED exposure: no  History of abnormal Pap smear: No  Family history of uterine or ovarian cancer: No  Regular self breast exam: Yes  History of abnormal mammogram: No  Family history of breast cancer: No  History of abnormal lipids: No    Past Medical History:  No date: Diffuse cystic mastopathy  No date: Mild intermittent asthma     Comment: alleries triggers include smells and seasonal               allergies  : Tachycardia, unspecified     Comment: ablated for re-entrant tachycardia      Family History    Diabetes Maternal Grandmother     Cancer Mother     Comment: lung cancer   69    Diabetes Sister 40    Comment: obesity    Breast Cancer No family hx of     Comment: cystic mastopathy    Cancer - colorectal No family hx of     C.A.D. No family hx of     Eye Disorder No family hx of     Depression No family hx of        Past Surgical History:  No date: C NONSPECIFIC PROCEDURE     Comment: ablation of rentrant tachy loop    Current Outpatient Prescriptions:  albuterol (PROAIR HFA) 108 (90 Base) MCG/ACT Inhaler   Cholecalciferol (VITAMIN D3)   fluticasone (FLOVENT HFA) 110 MCG/ACT Inhaler   Multiple Vitamin (MULTIVITAMIN OR)   Naproxen Sodium (ALEVE PO)   pantoprazole (PROTONIX) 40 MG EC tablet   VITAMIN E NATURAL PO   No current facility-administered medications for this visit.    -- Erythrocin -- Diarrhea  -- Penicillins   -- Sulfa Drugs        Smoking status: Never Smoker    Smokeless tobacco: Never Used    Comment: exposed as a child but never smoked    Alcohol use Yes  0.0 oz/week    0 Standard drinks or equivalent per week         Comment: one drink every 3 months/        Review Of Systems  Ears/Nose/Throat: spring allergies trigger asthma  Respiratory: asthma well controlled daily  "Flovent  Cardiovascular: negative  Gastrointestinal: heartburn on daily Protonix  Genitourinary: negative    OBJECTIVE:  BP 98/68 (BP Location: Left arm, Patient Position: Sitting, Cuff Size: Adult Regular)  Pulse 73  Temp 98.1  F (36.7  C) (Oral)  Ht 1.549 m (5' 1\")  Wt 59.6 kg (131 lb 6.4 oz)  LMP  (LMP Unknown)  SpO2 98%  BMI 24.83 kg/m2  General appearance: healthy, alert, no distress, cooperative and smiling  Skin: Skin color, texture, turgor normal. No rashes or lesions.  Ears: negative  Nose/Sinuses: Nares normal. Septum midline. Mucosa normal. No drainage or sinus tenderness.  Oropharynx: Lips, mucosa, and tongue normal. Teeth and gums normal.  Neck: Neck supple. No adenopathy. Thyroid symmetric, normal size,, Carotids without bruits.  Lungs: negative, Percussion normal. Good diaphragmatic excursion. Lungs clear  Heart: negative, PMI normal. No lifts, heaves, or thrills. RRR. No murmurs, clicks gallops or rub  Breasts: Inspection negative. No nipple discharge or bleeding. No masses.  Abdomen: Abdomen soft, non-tender. BS normal. No masses, organomegaly  Pelvic: External genitalia and vagina normal. Bimanual and rectovaginal normal., positive findings:  vaginal mucosa atrophy\  EXT: Foot and ankle exam is normal. Color and temperature of the feet is normal. Peripheral pulses are palpable.    BMI : Body mass index is 24.83 kg/(m^2).    ASSESSMENT:(Z01.419) Encounter for gynecological examination without abnormal finding  (primary encounter diagnosis)  Plan: CL AFF HEMOGLOBIN (BFP), VENOUS COLLECTION        Total calcium intake of 1500 mgm/day, vitamin D 400-800IU/day and a regular weight bearing exercise program for prevention of osteoporosis is recommended treatment at this time.      (J45.31) Mild persistent asthma with exacerbation  Comment: continue asthma action plan  Plan: fluticasone (FLOVENT HFA) 110 MCG/ACT Inhaler,         albuterol (PROAIR HFA) 108 (90 Base) MCG/ACT         inhaler, Asthma " Action Plan (AAP)        Refilled one year    (Z23) Need for vaccination  Plan: HC FLU VAC PRESRV FREE QUAD SPLIT VIR 3+YRS IM,        VACCINE ADMINISTRATION, INITIAL                Dx:  1)  Pap smear, mammogram  2)  Lipids at appropriate intervals    PE:  Reviewed health maintenance including diet, regular exercise,   estrogen replacement and periodic exams.

## 2018-10-09 NOTE — NURSING NOTE
Selene is here for non fasting PX with PAP, and flu shot        Patient is here for a full physical exam.    Pre-Visit Screening :  Immunizations : up to date  Colon Screening : is up to date  Mammogram: is up to date  Asthma Action Test/Plan : Yes  PHQ9 :  none  GAD7 :  none  Patient's  BMI Body mass index is 24.83 kg/(m^2).  Questioned patient about current smoking habits.  Pt. has never smoked.  OK to leave a detailed voice message regarding today's visit Yes, phone # [phone]      ETOH screening:  Questions:  1-How often do you have a drink containing alcohol?                             1 times per month(s)  2-How many drinks containing alcohol do you have on a typical day when you are         Drinking?                              1   3- How often do you have 5 or more drinks on one occasion?                              never

## 2018-10-10 ASSESSMENT — ASTHMA QUESTIONNAIRES: ACT_TOTALSCORE: 25

## 2018-10-15 DIAGNOSIS — K21.9 GASTROESOPHAGEAL REFLUX DISEASE WITHOUT ESOPHAGITIS: ICD-10-CM

## 2018-10-16 RX ORDER — PANTOPRAZOLE SODIUM 40 MG/1
TABLET, DELAYED RELEASE ORAL
Qty: 90 TABLET | Refills: 3 | Status: SHIPPED | OUTPATIENT
Start: 2018-10-16 | End: 2019-12-19

## 2018-10-16 NOTE — TELEPHONE ENCOUNTER
Selene called to say she went to pharmacy to  med but no med was ordered.  She just had a CPX 10/09/18    Pending Prescriptions:                       Disp   Refills    pantoprazole (PROTONIX) 40 MG EC tablet [*90 tab*3            Sig: TAKE ONE TABLET BY MOUTH EVERY DAY

## 2019-01-11 ENCOUNTER — OFFICE VISIT (OUTPATIENT)
Dept: FAMILY MEDICINE | Facility: CLINIC | Age: 54
End: 2019-01-11

## 2019-01-11 VITALS
HEART RATE: 95 BPM | SYSTOLIC BLOOD PRESSURE: 100 MMHG | DIASTOLIC BLOOD PRESSURE: 64 MMHG | WEIGHT: 132.6 LBS | HEIGHT: 61 IN | OXYGEN SATURATION: 98 % | TEMPERATURE: 98.2 F | BODY MASS INDEX: 25.04 KG/M2 | RESPIRATION RATE: 16 BRPM

## 2019-01-11 DIAGNOSIS — M26.609 TMJ (TEMPOROMANDIBULAR JOINT SYNDROME): ICD-10-CM

## 2019-01-11 DIAGNOSIS — J06.9 VIRAL UPPER RESPIRATORY TRACT INFECTION: Primary | ICD-10-CM

## 2019-01-11 PROCEDURE — 99213 OFFICE O/P EST LOW 20 MIN: CPT | Performed by: FAMILY MEDICINE

## 2019-01-11 ASSESSMENT — MIFFLIN-ST. JEOR: SCORE: 1143.85

## 2019-01-11 NOTE — PATIENT INSTRUCTIONS
Viral upper respiratory tract infection  (primary encounter diagnosis)  Plan: Symptomatic care with decongestants, fluids, tylenol/advil prn. Use GUAIFENESIN  MG OR TBCR, 1 tab po BID (Twice per day), D: 20, R: 0 for congestion and cough.    In addition, I have suggested that the patient   monitor for symptoms of bacterial infection expecting slow gradual resolution of viral URI as the natural course.      (M26.803) TMJ (temporomandibular joint syndrome)  Comment: ibuprofen  Plan: night guards/ ibuprofen. Rehab stretches/ exercises to begin immediately.

## 2019-01-11 NOTE — NURSING NOTE
June is here today for ear pressure in both ears.    Pre-visit Screening:up to date  Immunizations:  up to date  Colonoscopy:  is up to date  Mammogram: is up to date  Asthma Action Test/Plan:  NA  PHQ9:  NA  GAD7:  NA  Questioned patient about current smoking habits Pt. has never smoked.  Ok to leave detailed message on voice mail for today's visit only Yes, phone # [phone]

## 2019-05-11 NOTE — PROGRESS NOTES
SUBJECTIVE: 53 year old female complaining of nasal congestion and itching with pressure for 4 day(s).   The patient describes ear fullness and pressure.   The patient denies a history of SOB, GI symptoms or fever.   Smoking history: No.   Relevant past medical history: positive for allergies.    OBJECTIVE: The patient appears healthy, alert, no distress, cooperative, smiling and fatigued.   EARS: negative  NOSE/SINUS: positive findings: mucosa erythematous and swollen, clear rhinorrhea   THROAT: normal, post nasal drainage and tender bilateral TMJ   NECK:Neck supple. No adenopathy. Thyroid symmetric, normal size,, Carotids without bruits.   CHEST: Clear    ASSESSMENT: (J06.9) Viral upper respiratory tract infection  (primary encounter diagnosis)  Plan: Symptomatic care with decongestants, fluids, tylenol/advil prn. Use GUAIFENESIN  MG OR TBCR, 1 tab po BID (Twice per day), D: 20, R: 0 for congestion and cough.    In addition, I have suggested that the patient   monitor for symptoms of bacterial infection expecting slow gradual resolution of viral URI as the natural course.      (M26.105) TMJ (temporomandibular joint syndrome)  Comment: ibuprofen  Plan: night guards/ ibuprofen. Rehab stretches/ exercises to begin immediately.         N/A

## 2019-09-26 ENCOUNTER — OFFICE VISIT (OUTPATIENT)
Dept: FAMILY MEDICINE | Facility: CLINIC | Age: 54
End: 2019-09-26

## 2019-09-26 VITALS
SYSTOLIC BLOOD PRESSURE: 98 MMHG | HEIGHT: 61 IN | TEMPERATURE: 97.9 F | BODY MASS INDEX: 24.35 KG/M2 | OXYGEN SATURATION: 98 % | DIASTOLIC BLOOD PRESSURE: 62 MMHG | WEIGHT: 129 LBS | HEART RATE: 101 BPM

## 2019-09-26 DIAGNOSIS — J01.00 ACUTE NON-RECURRENT MAXILLARY SINUSITIS: Primary | ICD-10-CM

## 2019-09-26 PROCEDURE — 99213 OFFICE O/P EST LOW 20 MIN: CPT | Performed by: PHYSICIAN ASSISTANT

## 2019-09-26 RX ORDER — AZITHROMYCIN 250 MG/1
TABLET, FILM COATED ORAL
Qty: 6 TABLET | Refills: 0 | Status: SHIPPED | OUTPATIENT
Start: 2019-09-26 | End: 2019-11-07

## 2019-09-26 ASSESSMENT — MIFFLIN-ST. JEOR: SCORE: 1122.52

## 2019-09-26 NOTE — PATIENT INSTRUCTIONS
Suspect this is an allergic reaction, but does have some sinus symptoms especially facial pain. Given that it has been 7 days, can likely be resolved with anti-allergy medication. Recommended fexofenadine (Allegra), fluticasone (Flonase) nasal spray, okay to continue ibuprofen and Sudafed. Try heating face too to help with pressure.    If symptoms not improving by day 10, start antibiotic, because at that time, I would be more concerned for a bacterial sinus infection.

## 2019-09-26 NOTE — NURSING NOTE
Selene is here today for not feeling well, runny nose, itchy eyes, sinus pressure,a nd jaw pain.    Pre-visit Screening:  Immunizations:  up to date- will get flu shot at work  Colonoscopy:  is up to date  Mammogram: is up to date  Asthma Action Test/Plan:  NA  PHQ9:  NA  GAD7:  NA  Questioned patient about current smoking habits Pt. has never smoked.  Ok to leave detailed message on voice mail for today's visit only Yes, phone # [phone]

## 2019-09-26 NOTE — PROGRESS NOTES
"CC: Sinus symptoms, cough    History:  1 week ago, Selene noticed irritated throat, as well as face pressure and ear pressure. Has also been having some jaw pain, as well as itchy/watery eyes. Was feeling better over the weekend, but then came back stronger the last 2 days, especially with facial pressure and pain. She denies any cough. No fever, but is getting some sweats, chills. Has been taking Sudafed.    PMH, MEDICATIONS, ALLERGIES, SOCIAL AND FAMILY HISTORY in [facility] and reviewed by me personally.    ROS negative other than the symptoms noted above in the HPI.    Examination   BP 98/62 (BP Location: Right arm, Patient Position: Sitting, Cuff Size: Adult Large)   Pulse 101   Temp 97.9  F (36.6  C) (Oral)   Ht 1.549 m (5' 1\")   Wt 58.5 kg (129 lb)   LMP  (LMP Unknown)   SpO2 98%   BMI 24.37 kg/m       Constitutional: Sitting comfortably, in no acute distress. Vital signs noted  Eyes: pupils equal round reactive to light and accomodation, extra ocular movements intact  Ears: external canals and TMs free of abnormalities  Nose: patent, without mucosal abnormalities  Mouth and throat: without erythema or lesions of the mucosa  Neck:  no adenopathy, trachea midline and normal to palpation  Cardiovascular:  regular rate and rhythm, no murmurs, clicks, or gallops  Respiratory:  normal respiratory rate and rhythm, lungs clear to auscultation  SKIN: No jaundice/pallor/rash.   Psychiatric: mentation appears normal and affect normal/bright        A/P    ICD-10-CM    1. Acute non-recurrent maxillary sinusitis J01.00 azithromycin (ZITHROMAX) 250 MG tablet       DISCUSSION:  Suspect this is an allergic reaction, but does have some sinus symptoms especially facial pain. Given that it has only been 7 days, suspect this can be resolved with anti-allergy medication. Recommended fexofenadine (Allegra), fluticasone (Flonase) nasal spray, okay to continue ibuprofen and Sudafed. Try heating face too to help with pressure.    If " symptoms not improving by day 10, start antibiotic, because at that time, I would be more concerned for a bacterial sinus infection. Given penicillin allergy, prescribed z-pack that she has taken before without issue.     Contact me in 1 week if not significantly better, or sooner with worsening.     follow up visit: As needed    Polly Patterson PA-C  [facility]

## 2019-10-03 ENCOUNTER — HOSPITAL ENCOUNTER (OUTPATIENT)
Dept: MAMMOGRAPHY | Facility: CLINIC | Age: 54
Discharge: HOME | End: 2019-10-03
Attending: FAMILY MEDICINE | Admitting: FAMILY MEDICINE
Payer: COMMERCIAL

## 2019-10-03 DIAGNOSIS — Z12.31 VISIT FOR SCREENING MAMMOGRAM: ICD-10-CM

## 2019-10-03 PROCEDURE — 77063 BREAST TOMOSYNTHESIS BI: CPT

## 2019-11-07 ENCOUNTER — HEALTH MAINTENANCE LETTER (OUTPATIENT)
Age: 54
End: 2019-11-07

## 2019-11-07 ENCOUNTER — OFFICE VISIT (OUTPATIENT)
Dept: FAMILY MEDICINE | Facility: CLINIC | Age: 54
End: 2019-11-07

## 2019-11-07 VITALS
WEIGHT: 129 LBS | HEART RATE: 82 BPM | SYSTOLIC BLOOD PRESSURE: 110 MMHG | BODY MASS INDEX: 24.37 KG/M2 | OXYGEN SATURATION: 97 % | DIASTOLIC BLOOD PRESSURE: 60 MMHG | TEMPERATURE: 98.1 F

## 2019-11-07 DIAGNOSIS — H10.11 ALLERGIC CONJUNCTIVITIS, RIGHT: Primary | ICD-10-CM

## 2019-11-07 PROCEDURE — 99213 OFFICE O/P EST LOW 20 MIN: CPT | Performed by: PHYSICIAN ASSISTANT

## 2019-11-07 RX ORDER — OLOPATADINE HYDROCHLORIDE 1 MG/ML
1 SOLUTION/ DROPS OPHTHALMIC 2 TIMES DAILY
Qty: 1 BOTTLE | Refills: 0 | Status: SHIPPED | OUTPATIENT
Start: 2019-11-07 | End: 2020-01-07

## 2019-11-07 NOTE — NURSING NOTE
Billy is here for right eye irritation    Pre-visit Screening:  Immunizations:  up to date  Colonoscopy:  is up to date  Mammogram: is up to date  Asthma Action Test/Plan:  NA  PHQ9:  NA  GAD7:  NA  Questioned patient about current smoking habits Pt. has never smoked.  Ok to leave detailed message on voice mail for today's visit only Yes, phone # [phone]

## 2019-11-07 NOTE — PROGRESS NOTES
SUBJECTIVE:                                                    Selene Lyn is a 54 year old female who presents to clinic today for the following health issues:    Chief Complaint   Patient presents with     Eye Problem     right eye itchy and red dry flaky skin         Concerns with itchy right eye for 4 weeks.  Skin under the eye is pruritic as well  No discharge  No change in vision  No contacts    Occ. Nasal itching.  No antihistamine        ROS:  Constitutional: NEGATIVE for fever, chills, change in weight  Ears: NEGATIVE for pain, discharge, decreased hearing  Nose: NEGATIVE for rhinorrhea, epistaxis or congestion  Mouth: NEGATIVE for ulcerations, sore throat.   R: NEGATIVE for significant cough or SOB  CV: NEGATIVE for chest pain, palpitations or peripheral edema        BP Readings from Last 3 Encounters:   19 110/60   19 98/62   19 100/64    Wt Readings from Last 3 Encounters:   19 58.5 kg (129 lb)   19 58.5 kg (129 lb)   19 60.1 kg (132 lb 9.6 oz)            Patient Active Problem List   Diagnosis     Mild intermittent asthma     Diffuse cystic mastopathy     Plantar fascial fibromatosis     Allergic rhinitis     Other chronic allergic conjunctivitis     ACP (advance care planning)     Health Care Home     Pain in joint, shoulder region     TMJ (temporomandibular joint syndrome)     Past Surgical History:   Procedure Laterality Date     C NONSPECIFIC PROCEDURE      ablation of rentrant tachy loop       Social History     Tobacco Use     Smoking status: Never Smoker     Smokeless tobacco: Never Used     Tobacco comment: exposed as a child but never smoked   Substance Use Topics     Alcohol use: Yes     Alcohol/week: 0.0 standard drinks     Comment: one drink every 3 months/      Family History   Problem Relation Age of Onset     Diabetes Maternal Grandmother      Cancer Mother         lung cancer   69     Diabetes Sister 40        obesity     Breast Cancer No family  hx of         cystic mastopathy     Cancer - colorectal No family hx of      C.A.D. No family hx of      Eye Disorder No family hx of      Depression No family hx of          Current Outpatient Medications   Medication Sig Dispense Refill     Multiple Vitamin (MULTIVITAMIN OR) Take  by mouth.       Naproxen Sodium (ALEVE PO)        pantoprazole (PROTONIX) 40 MG EC tablet TAKE ONE TABLET BY MOUTH EVERY DAY 90 tablet 3     VITAMIN E NATURAL PO        albuterol (PROAIR HFA) 108 (90 Base) MCG/ACT inhaler Inhale 2 puffs into the lungs every 4 hours as needed for shortness of breath / dyspnea or wheezing 2 Inhaler 1     Cholecalciferol (VITAMIN D3)        fluticasone (FLOVENT HFA) 110 MCG/ACT Inhaler Inhale 1 puff into the lungs 2 times daily During allergy season as needed 3 Inhaler 3       Allergies   Allergen Reactions     Erythrocin Diarrhea     Penicillins      Sulfa Drugs        OBJECTIVE:                                                    /60 (BP Location: Left arm, Patient Position: Sitting, Cuff Size: Adult Regular)   Pulse 82   Temp 98.1  F (36.7  C) (Oral)   Wt 58.5 kg (129 lb)   LMP  (LMP Unknown)   SpO2 97%   BMI 24.37 kg/m   Body mass index is 24.37 kg/m .     GENERAL: alert and no distress  HEAD: Normocephalic, atraumatic    Eye exam:  Vision grossly intact.  Pupils are equal, round and reactive to light.  EOM in all directions bilaterally.    Right eye:   No discharge, mattering present.  Eyelids without swelling, erythema or ecchymosis.  Lashes normal.   Conjunctiva without injection or discharge.   Cornea clear, smooth.  No foreign body appreciated.    Left eye:  No discharge, mattering present.  Eyelids without swelling, erythema or ecchymosis.  Lashes normal.   Conjunctiva without injection or discharge.   Cornea clear, smooth.  No foreign body appreciated.    Fluorescein stain reveals no corneal abrasion      EARS:   Right: External ear and canal normal, TM normal  Left: External ear and  canal normal, TM normal  NOSE: No discharge noted  MOUTH/THROAT: no ulcers, no lesions, pharynx non-erythematous, no exudates present, tonsils without hypertrophy, mucous membranes moist.   NECK: no tenderness, no adenopathy, no asymmetry, no masses, no stiffness; thyroid- normal to palpation      Skin: slight erythema and scaling under right eye       ASSESSMENT/PLAN:                                                      1. Allergic conjunctivitis, right    Patanol rx'd  Zatidor if not covered by ins.  Start Allegra that she has at home.    See [facility] if no improvement in 1 week  - olopatadine (PATANOL) 0.1 % ophthalmic solution; Place 1 drop into the right eye 2 times daily  Dispense: 1 Bottle; Refill: 0      Tia Vinson PA-C  [facility]

## 2019-11-19 ENCOUNTER — OFFICE VISIT (OUTPATIENT)
Dept: FAMILY MEDICINE | Facility: CLINIC | Age: 54
End: 2019-11-19

## 2019-11-19 VITALS
TEMPERATURE: 98.4 F | WEIGHT: 126.8 LBS | HEART RATE: 86 BPM | OXYGEN SATURATION: 97 % | BODY MASS INDEX: 23.96 KG/M2 | SYSTOLIC BLOOD PRESSURE: 108 MMHG | DIASTOLIC BLOOD PRESSURE: 70 MMHG

## 2019-11-19 DIAGNOSIS — J01.00 ACUTE NON-RECURRENT MAXILLARY SINUSITIS: Primary | ICD-10-CM

## 2019-11-19 PROCEDURE — 99213 OFFICE O/P EST LOW 20 MIN: CPT | Performed by: PHYSICIAN ASSISTANT

## 2019-11-19 RX ORDER — DOXYCYCLINE 100 MG/1
100 CAPSULE ORAL 2 TIMES DAILY
Qty: 20 CAPSULE | Refills: 0 | Status: SHIPPED | OUTPATIENT
Start: 2019-11-19 | End: 2019-11-29

## 2019-11-19 NOTE — PROGRESS NOTES
CC: Sinus symptoms    History:  Selene is here today with 1 week of sinus pain. Has been having facial pain, teeth pain, and ear pain. Having glasses sit on face hurt. Has been taking sinus medications for 1-2 week without relief. Remembers having similar symptoms about 1 year ago that lasted this long.    Selene was recently here 9/26/2019 with sinus and cough symptoms, and was given a z-pack to take if not improving over several more days, but fortunately, those symptoms improved prior to her taking the z-pack.     PMH, MEDICATIONS, ALLERGIES, SOCIAL AND FAMILY HISTORY in [facility] and reviewed by me personally.    ROS negative other than the symptoms noted above in the HPI.      Examination   /70 (BP Location: Left arm, Patient Position: Sitting, Cuff Size: Adult Regular)   Pulse 86   Temp 98.4  F (36.9  C) (Oral)   Wt 57.5 kg (126 lb 12.8 oz)   LMP  (LMP Unknown)   SpO2 97%   BMI 23.96 kg/m       Constitutional: Sitting comfortably, in no acute distress. Vital signs noted  Eyes: pupils equal round reactive to light and accomodation, extra ocular movements intact  Ears: external canals and TMs free of abnormalities  Nose: patent, without mucosal abnormalities  Mouth and throat: without erythema or lesions of the mucosa  Neck:  no adenopathy, trachea midline and normal to palpation  Cardiovascular:  regular rate and rhythm, no murmurs, clicks, or gallops  Respiratory:  normal respiratory rate and rhythm, lungs clear to auscultation  SKIN: No jaundice/pallor/rash.   Psychiatric: mentation appears normal and affect normal/bright        A/P    ICD-10-CM    1. Acute non-recurrent maxillary sinusitis J01.00 doxycycline hyclate (VIBRAMYCIN) 100 MG capsule       DISCUSSION:  Given severity of symptoms for at least 1 week, recommended Selene complete 10 day course of doxycycline. She should take this twice daily with food, and should consider taking probiotic or eating yogurt in between. Warned her of possible side  effects including loose stool. Provided her with recommendations on OTC therapies based on symptoms. She should contact me in 1 week if symptoms are not improving, or sooner with any intolerable side effects or worsening symptoms.    follow up visit: As needed    [facility]

## 2019-11-19 NOTE — NURSING NOTE
June is here for possible sinus inf.    Pre-visit Screening:  Immunizations:  up to date  Colonoscopy:  is up to date  Mammogram: is up to date  Asthma Action Test/Plan:  LYNNE  PHQ9:  Na  GAD7:  NA  Questioned patient about current smoking habits Pt. has never smoked.  Ok to leave detailed message on voice mail for today's visit only Yes, phone # [phone]

## 2019-11-22 ENCOUNTER — TELEPHONE (OUTPATIENT)
Dept: FAMILY MEDICINE | Facility: CLINIC | Age: 54
End: 2019-11-22

## 2019-11-22 DIAGNOSIS — J01.00 ACUTE NON-RECURRENT MAXILLARY SINUSITIS: Primary | ICD-10-CM

## 2019-11-22 RX ORDER — AZITHROMYCIN 250 MG/1
TABLET, FILM COATED ORAL
Qty: 6 TABLET | Refills: 0 | Status: SHIPPED | OUTPATIENT
Start: 2019-11-22 | End: 2020-01-07

## 2019-11-22 NOTE — TELEPHONE ENCOUNTER
Called and spoke to June. She is having side effects, so switched to azithromycin. Added doxycycline to allergy list.

## 2019-11-22 NOTE — TELEPHONE ENCOUNTER
Selene was seen three days ago for sinusitis and called stating she is experiencing stomach pain and low back pain and wondering if this is due to the antibiotic?       Selene's phone # [phone]

## 2019-12-16 DIAGNOSIS — K21.9 GASTROESOPHAGEAL REFLUX DISEASE WITHOUT ESOPHAGITIS: ICD-10-CM

## 2019-12-16 RX ORDER — PANTOPRAZOLE SODIUM 40 MG/1
TABLET, DELAYED RELEASE ORAL
Qty: 90 TABLET | Refills: 2 | COMMUNITY
Start: 2019-12-16

## 2019-12-16 NOTE — TELEPHONE ENCOUNTER
Selene Lyn is requesting a refill of:    Refused Prescriptions:                       Disp   Refills    pantoprazole (PROTONIX) 40 MG EC tablet [P*90 tab*2        Sig: TAKE ONE TABLET BY MOUTH EVERY DAY  Refused By: GEOFF CARIAS  Reason for Refusal: Patient needs appointment    Pt last seen for CPX on 10/09/18. Due for med recheck or yearly CPX.

## 2019-12-19 DIAGNOSIS — K21.9 GASTROESOPHAGEAL REFLUX DISEASE WITHOUT ESOPHAGITIS: ICD-10-CM

## 2019-12-19 RX ORDER — PANTOPRAZOLE SODIUM 40 MG/1
40 TABLET, DELAYED RELEASE ORAL DAILY
Qty: 30 TABLET | Refills: 0 | COMMUNITY
Start: 2019-12-19 | End: 2020-01-10

## 2020-01-07 ENCOUNTER — OFFICE VISIT (OUTPATIENT)
Dept: FAMILY MEDICINE | Facility: CLINIC | Age: 55
End: 2020-01-07

## 2020-01-07 VITALS
OXYGEN SATURATION: 97 % | WEIGHT: 127.8 LBS | HEIGHT: 61 IN | HEART RATE: 81 BPM | SYSTOLIC BLOOD PRESSURE: 102 MMHG | DIASTOLIC BLOOD PRESSURE: 70 MMHG | TEMPERATURE: 97.1 F | BODY MASS INDEX: 24.13 KG/M2 | RESPIRATION RATE: 16 BRPM

## 2020-01-07 DIAGNOSIS — K21.9 GASTROESOPHAGEAL REFLUX DISEASE WITHOUT ESOPHAGITIS: ICD-10-CM

## 2020-01-07 DIAGNOSIS — Z01.411 ENCOUNTER FOR GYNECOLOGICAL EXAMINATION WITH ABNORMAL FINDING: Primary | ICD-10-CM

## 2020-01-07 DIAGNOSIS — J30.1 SEASONAL ALLERGIC RHINITIS DUE TO POLLEN: ICD-10-CM

## 2020-01-07 DIAGNOSIS — J45.31 MILD PERSISTENT ASTHMA WITH EXACERBATION: ICD-10-CM

## 2020-01-07 DIAGNOSIS — H10.11 ALLERGIC CONJUNCTIVITIS, RIGHT: ICD-10-CM

## 2020-01-07 DIAGNOSIS — Z12.4 SCREENING FOR CERVICAL CANCER: ICD-10-CM

## 2020-01-07 LAB — HEMOGLOBIN: 13.2 G/DL (ref 11.7–15.7)

## 2020-01-07 PROCEDURE — 88142 CYTOPATH C/V THIN LAYER: CPT | Mod: 90 | Performed by: FAMILY MEDICINE

## 2020-01-07 PROCEDURE — 85018 HEMOGLOBIN: CPT | Performed by: FAMILY MEDICINE

## 2020-01-07 PROCEDURE — 99396 PREV VISIT EST AGE 40-64: CPT | Performed by: FAMILY MEDICINE

## 2020-01-07 PROCEDURE — 36415 COLL VENOUS BLD VENIPUNCTURE: CPT | Performed by: FAMILY MEDICINE

## 2020-01-07 RX ORDER — FLUTICASONE PROPIONATE 110 UG/1
1 AEROSOL, METERED RESPIRATORY (INHALATION) 2 TIMES DAILY
Qty: 3 INHALER | Refills: 3 | Status: SHIPPED | OUTPATIENT
Start: 2020-01-07 | End: 2022-03-19

## 2020-01-07 RX ORDER — ALBUTEROL SULFATE 90 UG/1
2 AEROSOL, METERED RESPIRATORY (INHALATION) EVERY 4 HOURS PRN
Qty: 2 INHALER | Refills: 1 | Status: SHIPPED | OUTPATIENT
Start: 2020-01-07

## 2020-01-07 RX ORDER — OLOPATADINE HYDROCHLORIDE 1 MG/ML
1 SOLUTION/ DROPS OPHTHALMIC 2 TIMES DAILY
Qty: 1 BOTTLE | Refills: 3 | Status: SHIPPED | OUTPATIENT
Start: 2020-01-07 | End: 2022-03-19

## 2020-01-07 SDOH — ECONOMIC STABILITY: FOOD INSECURITY: WITHIN THE PAST 12 MONTHS, THE FOOD YOU BOUGHT JUST DIDN'T LAST AND YOU DIDN'T HAVE MONEY TO GET MORE.: NEVER TRUE

## 2020-01-07 SDOH — ECONOMIC STABILITY: TRANSPORTATION INSECURITY
IN THE PAST 12 MONTHS, HAS THE LACK OF TRANSPORTATION KEPT YOU FROM MEDICAL APPOINTMENTS OR FROM GETTING MEDICATIONS?: NO

## 2020-01-07 SDOH — ECONOMIC STABILITY: TRANSPORTATION INSECURITY
IN THE PAST 12 MONTHS, HAS LACK OF TRANSPORTATION KEPT YOU FROM MEETINGS, WORK, OR FROM GETTING THINGS NEEDED FOR DAILY LIVING?: NO

## 2020-01-07 SDOH — ECONOMIC STABILITY: FOOD INSECURITY: WITHIN THE PAST 12 MONTHS, YOU WORRIED THAT YOUR FOOD WOULD RUN OUT BEFORE YOU GOT MONEY TO BUY MORE.: NEVER TRUE

## 2020-01-07 SDOH — ECONOMIC STABILITY: INCOME INSECURITY: HOW HARD IS IT FOR YOU TO PAY FOR THE VERY BASICS LIKE FOOD, HOUSING, MEDICAL CARE, AND HEATING?: NOT HARD AT ALL

## 2020-01-07 ASSESSMENT — MIFFLIN-ST. JEOR: SCORE: 1121.04

## 2020-01-07 NOTE — PROGRESS NOTES
SUBJECTIVE:  Selene Lyn is an 54 year old  postmenopausal woman   who presents for annual gyn exam. Menopause at age 50. No   bleeding, spotting, or discharge noted.     Estrogen replacement therapy: never  MED exposure: no  History of abnormal Pap smear: No  Family history of uterine or ovarian cancer: No  Regular self breast exam: Yes  History of abnormal mammogram: No  Family history of breast cancer: No  History of abnormal lipids: No    Past Medical History:  2009: Allergic rhinitis      Comment:   Problem list name updated by automated process.                Provider to review  No date: Diffuse cystic mastopathy  No date: Mild intermittent asthma      Comment:  alleries triggers include smells and seasonal allergies  : Tachycardia, unspecified      Comment:  ablated for re-entrant tachycardia  2019: TMJ (temporomandibular joint syndrome)    Review of patient's family history indicates:  Problem: Diabetes      Relation: Maternal Grandmother          Age of Onset: (Not Specified)  Problem: Cancer      Relation: Mother          Age of Onset: (Not Specified)          Comment: lung cancer   69  Problem: Diabetes      Relation: Sister          Age of Onset: 40          Comment: obesity  Problem: Breast Cancer      Relation: No family hx of          Age of Onset: (Not Specified)          Comment: cystic mastopathy  Problem: Cancer - colorectal      Relation: No family hx of          Age of Onset: (Not Specified)  Problem: C.A.D.      Relation: No family hx of          Age of Onset: (Not Specified)  Problem: Eye Disorder      Relation: No family hx of          Age of Onset: (Not Specified)  Problem: Depression      Relation: No family hx of          Age of Onset: (Not Specified)      Past Surgical History:  No date: C NONSPECIFIC PROCEDURE      Comment:  ablation of rentrant tachy loop    Current Outpatient Medications:  albuterol (PROAIR HFA) 108 (90 Base) MCG/ACT inhaler  Cholecalciferol  "(VITAMIN D3)  fluticasone (FLOVENT HFA) 110 MCG/ACT Inhaler  Multiple Vitamin (MULTIVITAMIN OR)  Naproxen Sodium (ALEVE PO)  olopatadine (PATANOL) 0.1 % ophthalmic solution  pantoprazole (PROTONIX) 40 MG EC tablet  VITAMIN E NATURAL PO    No current facility-administered medications for this visit.      -- Doxycycline -- Cramps   -- Erythrocin -- Diarrhea   -- Penicillins    -- Sulfa Drugs     Social History    Tobacco Use      Smoking status: Never Smoker      Smokeless tobacco: Never Used      Tobacco comment: exposed as a child but never smoked    Alcohol use: Yes      Alcohol/week: 0.0 standard drinks      Comment: one drink every 3 months/       Review Of Systems  Ears/Nose/Throat: spring allergies triggering asthma  Respiratory: asthma well controlled with Flovent gomez  Cardiovascular: negative  Gastrointestinal: heartburn and on daily PPI  Genitourinary: negative    OBJECTIVE:  /70 (BP Location: Left arm, Patient Position: Sitting, Cuff Size: Adult Regular)   Pulse 81   Temp 97.1  F (36.2  C) (Oral)   Ht 1.556 m (5' 1.25\")   Wt 58 kg (127 lb 12.8 oz)   LMP  (LMP Unknown)   SpO2 97%   BMI 23.95 kg/m    General appearance: healthy, alert, no distress, cooperative and smiling  Skin: Skin color, texture, turgor normal. No rashes or lesions.  Ears: negative  Nose/Sinuses: Nares normal. Septum midline. Mucosa normal. No drainage or sinus tenderness.  Oropharynx: Lips, mucosa, and tongue normal. Teeth and gums normal.  Neck: Neck supple. No adenopathy. Thyroid symmetric, normal size,, Carotids without bruits.  Lungs: negative, Percussion normal. Good diaphragmatic excursion. Lungs clear  Heart: negative, PMI normal. No lifts, heaves, or thrills. RRR. No murmurs, clicks gallops or rub  Breasts: Inspection negative. No nipple discharge or bleeding. No masses.  Abdomen: Abdomen soft, non-tender. BS normal. No masses, organomegaly  Pelvic: External genitalia and vagina normal. Bimanual and rectovaginal " normal., positive findings:  vaginal mucosa atrophy  BMI : Body mass index is 23.95 kg/m .    ASSESSMENT:  (Z01.411) Encounter for gynecological examination with abnormal finding  (primary encounter diagnosis)  Plan: ThinPrep Pap and HPV (mRNA E6/E7)         ([facility]), CL AFF HEMOGLOBIN (BFP), VENOUS         COLLECTION        Exercise encouraged  Follow up in 1 year  PAP smear obtained  Refills given  Routine breast self-exam encouraged  Seat belt use encouraged  Sunscreen recommended      (K21.9) Gastroesophageal reflux disease without esophagitis  Plan: CL AFF HEMOGLOBIN (BFP), VENOUS COLLECTION        I have reviewed the patient's medical history in detail and updated the computerized patient record.   Potential medication side effects were discussed with the patient; let me know if any occur.      (J45.31) Mild persistent asthma with exacerbation  Plan: fluticasone (FLOVENT HFA) 110 MCG/ACT inhaler,         albuterol (PROAIR HFA) 108 (90 Base) MCG/ACT         inhaler        Follow AAP/ reviewed and refilled medications    (Z12.4) Screening for cervical cancer  Plan: ThinPrep Pap and HPV (mRNA E6/E7)         ([facility])            (J30.1) Seasonal allergic rhinitis due to pollen  Plan: I have reviewed the patient's medical history in detail and updated the computerized patient record.      (H10.11) Allergic conjunctivitis, right  Plan: olopatadine (PATANOL) 0.1 % ophthalmic solution              Dx:  1)  Pap smear, mammogram  2)  Lipids at appropriate intervals    PE:  Reviewed health maintenance including diet, regular exercise,   estrogen replacement and periodic exams.

## 2020-01-07 NOTE — PATIENT INSTRUCTIONS
Exercise encouraged  Follow up in 1 year  PAP smear obtained  Refills given  Routine breast self-exam encouraged  Seat belt use encouraged  Sunscreen recommended      (K21.9) Gastroesophageal reflux disease without esophagitis  Plan:         I have reviewed the patient's medical history in detail and updated the computerized patient record.   Potential medication side effects were discussed with the patient; let me know if any occur.      (J45.31) Mild persistent asthma with exacerbation  Plan: fluticasone (FLOVENT HFA) 110 MCG/ACT inhaler,         albuterol (PROAIR HFA) 108 (90 Base) MCG/ACT         inhaler        Follow AAP/ reviewed and refilled medications

## 2020-01-07 NOTE — NURSING NOTE
Selene is here for CPX with pap today    Pre-visit Screening:  Immunizations:  up to date  Colonoscopy:  is up to date  Mammogram: is up to date  Asthma Action Test/Plan:  Done today  PHQ9:  None  GAD7:  None  Questioned patient about current smoking habits Pt. has never smoked.  Ok to leave detailed message on voice mail for today's visit only Yes, phone # [phone]

## 2020-01-08 ASSESSMENT — ASTHMA QUESTIONNAIRES: ACT_TOTALSCORE: 22

## 2020-01-09 DIAGNOSIS — K21.9 GASTROESOPHAGEAL REFLUX DISEASE WITHOUT ESOPHAGITIS: ICD-10-CM

## 2020-01-10 LAB
CLINICAL HISTORY - QUEST: NORMAL
COMMENT - QUEST: NORMAL
CYTOTECHNOLOGIST - QUEST: NORMAL
DESCRIPTIVE DIAGNOSIS - QUEST: NORMAL
LAST PAP DX - QUEST: NORMAL
LMP - QUEST: NORMAL
PREV BX DX - QUEST: NORMAL
SOURCE: NORMAL
STATEMENT OF ADEQUACY - QUEST: NORMAL

## 2020-01-10 RX ORDER — PANTOPRAZOLE SODIUM 40 MG/1
40 TABLET, DELAYED RELEASE ORAL DAILY
Qty: 90 TABLET | Refills: 3 | Status: SHIPPED | OUTPATIENT
Start: 2020-01-10

## 2020-01-10 NOTE — TELEPHONE ENCOUNTER
Pending Prescriptions:                       Disp   Refills    pantoprazole (PROTONIX) 40 MG EC tablet [*                    Sig: TAKE ONE TABLET BY MOUTH ONCE DAILY (NEED TO BE           SEEN IN CLINIC FOR FURTHER REFILLS)    Last refill was 12- for one month  Pt here on 1-7-2020  Fax or mehran Best

## 2020-08-07 ENCOUNTER — RECORDS - HEALTHEAST (OUTPATIENT)
Dept: LAB | Facility: CLINIC | Age: 55
End: 2020-08-07

## 2020-08-07 LAB — RHEUMATOID FACT SERPL-ACNC: <15 IU/ML (ref 0–30)

## 2020-08-10 ENCOUNTER — TELEPHONE (OUTPATIENT)
Dept: FAMILY MEDICINE | Facility: CLINIC | Age: 55
End: 2020-08-10

## 2020-08-11 LAB — CCP AB SER IA-ACNC: 1.4 U/ML

## 2020-11-11 ENCOUNTER — THERAPY VISIT (OUTPATIENT)
Dept: PHYSICAL THERAPY | Facility: CLINIC | Age: 55
End: 2020-11-11
Payer: COMMERCIAL

## 2020-11-11 DIAGNOSIS — M53.3 PAIN IN THE COCCYX: Primary | ICD-10-CM

## 2020-11-11 DIAGNOSIS — M25.561 RIGHT KNEE PAIN: ICD-10-CM

## 2020-11-11 PROCEDURE — 97140 MANUAL THERAPY 1/> REGIONS: CPT | Mod: GP | Performed by: PHYSICAL THERAPIST

## 2020-11-11 PROCEDURE — 97161 PT EVAL LOW COMPLEX 20 MIN: CPT | Mod: GP | Performed by: PHYSICAL THERAPIST

## 2020-11-11 PROCEDURE — 97110 THERAPEUTIC EXERCISES: CPT | Mod: GP | Performed by: PHYSICAL THERAPIST

## 2020-11-11 NOTE — PROGRESS NOTES
[facility] Initial Evaluation  Subjective:  Started new job as  Sept 21, 2020 so a lot more sitting. Since then has had a flare up of coccyx pain, and also B IT's. Using mesh ergonomic chair with pad. Also some R knee swelling/pain with waking up one day ~ 1 month ago. Was very stiff/swollen. Went to MD and given a few exercises which have helped overall. WORSE with squatting, little careful with stairs.     The history is provided by the patient. No  was used.   Patient Health History         Pain is reported as 7/10 on pain scale.  General health as reported by patient is fair.  Pertinent medical history includes: asthma.     Medical allergies: Penicillin/sulfa drugs.   Surgeries include:  None.    Current medications: GERD.    Current occupation is .   Primary job tasks include:  Prolonged sitting and computer work.                                    Objective:  Standing Alignment:        Lumbar:  Lordosis decr            Gait:  Mild discomfort R anterior knee with 6 inch step down. Pain with full squat.   Gait Type:  Normal                    Lumbar/SI Evaluation  ROM:  AROM Lumbar: normal (mod loss extension, all others WNL. )                        SI joint/Sacrum:    Coccyx in flexion, TTP internal pelvic floor to puborectalis and coccygeus. Good kegel strength and relaxation.                                                  Knee Evaluation:  ROM:  Strength wnl knee: R hip abductor strength 3+/5, L 4/5.     PROM    Hyperextension: Left: 5    Right:  5  Extension: Left: 0    Right:  0  Flexion: Left: 155    Right:  145, end range tightness      Strength:         Quad Set Left:  Good    Pain: -   Quad Set Right:  Fair and good    Pain: -        Edema:  Edema of the knee: suprapatellar R 36cm, L 34.5cm.      Functional Testing:          Quad:    Single Leg Squat:  Left:      Right:        Bilateral Leg Squat:   Excessive anterior knee excursion  and decreased hip/trunk flexion    Retro Step Up: Left:  WNL step down 6 in    Right: 6 inch step down fair control and discomfort    % of Uninvolved:           General     ROS    Assessment/Plan:    Patient is a 55 year old female with sacral and right side knee complaints.    Patient has the following significant findings with corresponding treatment plan.                Diagnosis 1:  Coccyx pain and R knee pain  Pain -  hot/cold therapy, manual therapy, splint/taping/bracing/orthotics, self management, education, directional preference exercise and home program  Decreased ROM/flexibility - manual therapy and therapeutic exercise  Decreased joint mobility - manual therapy and therapeutic exercise  Decreased strength - therapeutic exercise and therapeutic activities  Decreased proprioception - neuro re-education and therapeutic activities  Inflammation - cold therapy and self management/home program  Impaired gait - gait training  Impaired muscle performance - neuro re-education  Decreased function - therapeutic activities  Impaired posture - neuro re-education    Therapy Evaluation Codes:   1) History comprised of:   Personal factors that impact the plan of care:      Time since onset of symptoms.    Comorbidity factors that impact the plan of care are:      Asthma.     Medications impacting care: Anti-inflammatory.  2) Examination of Body Systems comprised of:   Body structures and functions that impact the plan of care:      Knee and Pelvis.   Activity limitations that impact the plan of care are:      Sitting and Squatting/kneeling.  3) Clinical presentation characteristics are:   Stable/Uncomplicated.  4) Decision-Making    Low complexity using standardized patient assessment instrument and/or measureable assessment of functional outcome.  Cumulative Therapy Evaluation is: Low complexity.    Previous and current functional limitations:  (See Goal Flow Sheet for this information)    Short term and Long term  goals: (See Goal Flow Sheet for this information)     Communication ability:  Patient appears to be able to clearly communicate and understand verbal and written communication and follow directions correctly.  Treatment Explanation - The following has been discussed with the patient:   RX ordered/plan of care  Anticipated outcomes  Possible risks and side effects  This patient would benefit from PT intervention to resume normal activities.   Rehab potential is good.    Frequency:  2 X a month, once daily  Duration:  for 3 months  Discharge Plan:  Achieve all LTG.  Independent in home treatment program.  Reach maximal therapeutic benefit.    Please refer to the daily flowsheet for treatment today, total treatment time and time spent performing 1:1 timed codes.

## 2020-11-17 PROBLEM — M25.561 RIGHT KNEE PAIN: Status: ACTIVE | Noted: 2020-11-17

## 2020-11-18 ENCOUNTER — RECORDS - HEALTHEAST (OUTPATIENT)
Dept: LAB | Facility: CLINIC | Age: 55
End: 2020-11-18

## 2020-11-18 ENCOUNTER — THERAPY VISIT (OUTPATIENT)
Dept: PHYSICAL THERAPY | Facility: CLINIC | Age: 55
End: 2020-11-18
Payer: COMMERCIAL

## 2020-11-18 DIAGNOSIS — M25.561 RIGHT KNEE PAIN: ICD-10-CM

## 2020-11-18 DIAGNOSIS — M53.3 PAIN IN THE COCCYX: ICD-10-CM

## 2020-11-18 LAB
CHOLEST SERPL-MCNC: 205 MG/DL
FASTING STATUS PATIENT QL REPORTED: ABNORMAL
HDLC SERPL-MCNC: 74 MG/DL
LDLC SERPL CALC-MCNC: 112 MG/DL
TRIGL SERPL-MCNC: 95 MG/DL

## 2020-11-18 PROCEDURE — 97112 NEUROMUSCULAR REEDUCATION: CPT | Mod: GP | Performed by: PHYSICAL THERAPIST

## 2020-11-18 PROCEDURE — 97140 MANUAL THERAPY 1/> REGIONS: CPT | Mod: GP | Performed by: PHYSICAL THERAPIST

## 2020-11-18 PROCEDURE — 97110 THERAPEUTIC EXERCISES: CPT | Mod: GP | Performed by: PHYSICAL THERAPIST

## 2020-11-19 LAB
HCV AB SERPL QL IA: NEGATIVE
HPV SOURCE: NORMAL
HUMAN PAPILLOMA VIRUS 16 DNA: NEGATIVE
HUMAN PAPILLOMA VIRUS 18 DNA: NEGATIVE
HUMAN PAPILLOMA VIRUS FINAL DIAGNOSIS: NORMAL
HUMAN PAPILLOMA VIRUS OTHER HR: NEGATIVE
SPECIMEN DESCRIPTION: NORMAL

## 2020-11-29 ENCOUNTER — HEALTH MAINTENANCE LETTER (OUTPATIENT)
Age: 55
End: 2020-11-29

## 2020-12-01 ENCOUNTER — THERAPY VISIT (OUTPATIENT)
Dept: PHYSICAL THERAPY | Facility: CLINIC | Age: 55
End: 2020-12-01
Payer: COMMERCIAL

## 2020-12-01 DIAGNOSIS — M25.561 RIGHT KNEE PAIN: ICD-10-CM

## 2020-12-01 DIAGNOSIS — M53.3 PAIN IN THE COCCYX: ICD-10-CM

## 2020-12-01 PROCEDURE — 97110 THERAPEUTIC EXERCISES: CPT | Mod: GP | Performed by: PHYSICAL THERAPIST

## 2020-12-01 PROCEDURE — 97112 NEUROMUSCULAR REEDUCATION: CPT | Mod: GP | Performed by: PHYSICAL THERAPIST

## 2020-12-01 PROCEDURE — 97140 MANUAL THERAPY 1/> REGIONS: CPT | Mod: GP | Performed by: PHYSICAL THERAPIST

## 2020-12-14 ENCOUNTER — THERAPY VISIT (OUTPATIENT)
Dept: PHYSICAL THERAPY | Facility: CLINIC | Age: 55
End: 2020-12-14
Payer: COMMERCIAL

## 2020-12-14 DIAGNOSIS — M25.561 RIGHT KNEE PAIN: ICD-10-CM

## 2020-12-14 DIAGNOSIS — M53.3 PAIN IN THE COCCYX: ICD-10-CM

## 2020-12-14 PROCEDURE — 97110 THERAPEUTIC EXERCISES: CPT | Mod: GP | Performed by: PHYSICAL THERAPIST

## 2020-12-14 PROCEDURE — 97140 MANUAL THERAPY 1/> REGIONS: CPT | Mod: GP | Performed by: PHYSICAL THERAPIST

## 2020-12-14 PROCEDURE — 97112 NEUROMUSCULAR REEDUCATION: CPT | Mod: GP | Performed by: PHYSICAL THERAPIST

## 2020-12-21 ENCOUNTER — THERAPY VISIT (OUTPATIENT)
Dept: PHYSICAL THERAPY | Facility: CLINIC | Age: 55
End: 2020-12-21
Payer: COMMERCIAL

## 2020-12-21 DIAGNOSIS — M25.561 ACUTE PAIN OF RIGHT KNEE: ICD-10-CM

## 2020-12-21 DIAGNOSIS — M53.3 PAIN IN THE COCCYX: ICD-10-CM

## 2020-12-21 PROCEDURE — 97112 NEUROMUSCULAR REEDUCATION: CPT | Mod: GP | Performed by: PHYSICAL THERAPY ASSISTANT

## 2020-12-21 PROCEDURE — 97110 THERAPEUTIC EXERCISES: CPT | Mod: GP | Performed by: PHYSICAL THERAPY ASSISTANT

## 2020-12-23 ENCOUNTER — THERAPY VISIT (OUTPATIENT)
Dept: PHYSICAL THERAPY | Facility: CLINIC | Age: 55
End: 2020-12-23
Payer: COMMERCIAL

## 2020-12-23 DIAGNOSIS — M53.3 PAIN IN THE COCCYX: ICD-10-CM

## 2020-12-23 DIAGNOSIS — M25.561 ACUTE PAIN OF RIGHT KNEE: ICD-10-CM

## 2020-12-23 PROCEDURE — 97112 NEUROMUSCULAR REEDUCATION: CPT | Mod: GP | Performed by: PHYSICAL THERAPIST

## 2020-12-23 PROCEDURE — 97140 MANUAL THERAPY 1/> REGIONS: CPT | Mod: GP | Performed by: PHYSICAL THERAPIST

## 2020-12-23 PROCEDURE — 97110 THERAPEUTIC EXERCISES: CPT | Mod: GP | Performed by: PHYSICAL THERAPIST

## 2020-12-31 ENCOUNTER — THERAPY VISIT (OUTPATIENT)
Dept: PHYSICAL THERAPY | Facility: CLINIC | Age: 55
End: 2020-12-31
Payer: COMMERCIAL

## 2020-12-31 DIAGNOSIS — M25.561 ACUTE PAIN OF RIGHT KNEE: ICD-10-CM

## 2020-12-31 DIAGNOSIS — M53.3 PAIN IN THE COCCYX: ICD-10-CM

## 2020-12-31 PROCEDURE — 97110 THERAPEUTIC EXERCISES: CPT | Mod: GP | Performed by: PHYSICAL THERAPY ASSISTANT

## 2020-12-31 PROCEDURE — 97112 NEUROMUSCULAR REEDUCATION: CPT | Mod: GP | Performed by: PHYSICAL THERAPY ASSISTANT

## 2020-12-31 NOTE — PROGRESS NOTES
Date: Dec 31, 2020    Blood Flow Restriction Training: Contraindications and precautions reviewed, pt safe for use of  modality, Risks and benefits discussed; pt gave informed consent  Lingering effects (from previous session):   Target Limb: Right  Lower Extremity  Limb Occlusion Pressure (LOP): 176 mmHg  Percent Occlusion (80% for LE, 50% for UE): 80 %  Personal Tourniquet Pressure (PTP): 140 mmHg  Tourniquet Size: Green    SET #1   Exercises Performed Total time under tourniquet Immediate effects   Rate of Perceived Exertion   1 (30 reps) R SLR AG 2 min tired 8/10   2 (15 reps) R SLR AG 1 min tired 9/10   3 (15 reps) R SLR AG 1 min Unable to continue /10   4 (15 reps) R SLR AG 1 min  Unable to continue /10       SET #2   Exercises Performed Total time under tourniquet Immediate effects   Rate of Perceived Exertion   1 (30 reps) R LP 1.5 pl sh 4 2 min ok 7/10   2 (15 reps) R LP 1.5 pl sh 4 1 min tire 8/10   3 (15 reps) R LP 1.5 pl sh 4 1 min Unable to finish /10   4 (15 reps) R LP 1.5 pl sh 4 1 min  Unable to finish /10     NOTES:

## 2021-01-04 ENCOUNTER — THERAPY VISIT (OUTPATIENT)
Dept: PHYSICAL THERAPY | Facility: CLINIC | Age: 56
End: 2021-01-04
Payer: COMMERCIAL

## 2021-01-04 DIAGNOSIS — M53.3 PAIN IN THE COCCYX: ICD-10-CM

## 2021-01-04 DIAGNOSIS — M25.561 ACUTE PAIN OF RIGHT KNEE: ICD-10-CM

## 2021-01-04 PROCEDURE — 97110 THERAPEUTIC EXERCISES: CPT | Mod: GP | Performed by: PHYSICAL THERAPY ASSISTANT

## 2021-01-04 PROCEDURE — 97112 NEUROMUSCULAR REEDUCATION: CPT | Mod: GP | Performed by: PHYSICAL THERAPY ASSISTANT

## 2021-01-11 ENCOUNTER — THERAPY VISIT (OUTPATIENT)
Dept: PHYSICAL THERAPY | Facility: CLINIC | Age: 56
End: 2021-01-11
Payer: COMMERCIAL

## 2021-01-11 DIAGNOSIS — M53.3 PAIN IN THE COCCYX: ICD-10-CM

## 2021-01-11 DIAGNOSIS — M25.561 ACUTE PAIN OF RIGHT KNEE: ICD-10-CM

## 2021-01-11 PROCEDURE — 97110 THERAPEUTIC EXERCISES: CPT | Mod: GP | Performed by: PHYSICAL THERAPY ASSISTANT

## 2021-01-11 PROCEDURE — 97112 NEUROMUSCULAR REEDUCATION: CPT | Mod: GP | Performed by: PHYSICAL THERAPY ASSISTANT

## 2021-01-13 ENCOUNTER — THERAPY VISIT (OUTPATIENT)
Dept: PHYSICAL THERAPY | Facility: CLINIC | Age: 56
End: 2021-01-13
Payer: COMMERCIAL

## 2021-01-13 DIAGNOSIS — M25.561 ACUTE PAIN OF RIGHT KNEE: ICD-10-CM

## 2021-01-13 DIAGNOSIS — M53.3 PAIN IN THE COCCYX: ICD-10-CM

## 2021-01-13 PROCEDURE — 97112 NEUROMUSCULAR REEDUCATION: CPT | Mod: GP | Performed by: PHYSICAL THERAPIST

## 2021-01-13 PROCEDURE — 97140 MANUAL THERAPY 1/> REGIONS: CPT | Mod: GP | Performed by: PHYSICAL THERAPIST

## 2021-01-15 ENCOUNTER — HEALTH MAINTENANCE LETTER (OUTPATIENT)
Age: 56
End: 2021-01-15

## 2021-01-20 ENCOUNTER — THERAPY VISIT (OUTPATIENT)
Dept: PHYSICAL THERAPY | Facility: CLINIC | Age: 56
End: 2021-01-20
Payer: COMMERCIAL

## 2021-01-20 DIAGNOSIS — M25.561 ACUTE PAIN OF RIGHT KNEE: ICD-10-CM

## 2021-01-20 DIAGNOSIS — M53.3 PAIN IN THE COCCYX: ICD-10-CM

## 2021-01-20 PROCEDURE — 97110 THERAPEUTIC EXERCISES: CPT | Mod: GP | Performed by: PHYSICAL THERAPY ASSISTANT

## 2021-01-20 PROCEDURE — 97112 NEUROMUSCULAR REEDUCATION: CPT | Mod: GP | Performed by: PHYSICAL THERAPY ASSISTANT

## 2021-01-26 ENCOUNTER — THERAPY VISIT (OUTPATIENT)
Dept: PHYSICAL THERAPY | Facility: CLINIC | Age: 56
End: 2021-01-26
Payer: COMMERCIAL

## 2021-01-26 DIAGNOSIS — M53.3 PAIN IN THE COCCYX: ICD-10-CM

## 2021-01-26 DIAGNOSIS — M25.561 ACUTE PAIN OF RIGHT KNEE: ICD-10-CM

## 2021-01-26 PROCEDURE — 97110 THERAPEUTIC EXERCISES: CPT | Mod: GP | Performed by: PHYSICAL THERAPY ASSISTANT

## 2021-01-26 PROCEDURE — 97112 NEUROMUSCULAR REEDUCATION: CPT | Mod: GP | Performed by: PHYSICAL THERAPY ASSISTANT

## 2021-02-04 ENCOUNTER — RECORDS - HEALTHEAST (OUTPATIENT)
Dept: LAB | Facility: CLINIC | Age: 56
End: 2021-02-04

## 2021-02-06 LAB — BACTERIA SPEC CULT: NORMAL

## 2021-02-10 ENCOUNTER — THERAPY VISIT (OUTPATIENT)
Dept: PHYSICAL THERAPY | Facility: CLINIC | Age: 56
End: 2021-02-10
Payer: COMMERCIAL

## 2021-02-10 DIAGNOSIS — M25.561 ACUTE PAIN OF RIGHT KNEE: ICD-10-CM

## 2021-02-10 DIAGNOSIS — M53.3 PAIN IN THE COCCYX: ICD-10-CM

## 2021-02-10 PROCEDURE — 97112 NEUROMUSCULAR REEDUCATION: CPT | Mod: GP | Performed by: PHYSICAL THERAPY ASSISTANT

## 2021-02-10 PROCEDURE — 97110 THERAPEUTIC EXERCISES: CPT | Mod: GP | Performed by: PHYSICAL THERAPY ASSISTANT

## 2021-02-14 ENCOUNTER — HEALTH MAINTENANCE LETTER (OUTPATIENT)
Age: 56
End: 2021-02-14

## 2021-02-15 ENCOUNTER — THERAPY VISIT (OUTPATIENT)
Dept: PHYSICAL THERAPY | Facility: CLINIC | Age: 56
End: 2021-02-15
Payer: COMMERCIAL

## 2021-02-15 DIAGNOSIS — M25.561 ACUTE PAIN OF RIGHT KNEE: ICD-10-CM

## 2021-02-15 DIAGNOSIS — M53.3 PAIN IN THE COCCYX: ICD-10-CM

## 2021-02-15 PROCEDURE — 97140 MANUAL THERAPY 1/> REGIONS: CPT | Mod: GP | Performed by: PHYSICAL THERAPIST

## 2021-02-15 PROCEDURE — 97112 NEUROMUSCULAR REEDUCATION: CPT | Mod: GP | Performed by: PHYSICAL THERAPIST

## 2021-02-15 PROCEDURE — 97110 THERAPEUTIC EXERCISES: CPT | Mod: GP | Performed by: PHYSICAL THERAPIST

## 2021-02-25 ENCOUNTER — THERAPY VISIT (OUTPATIENT)
Dept: PHYSICAL THERAPY | Facility: CLINIC | Age: 56
End: 2021-02-25
Payer: COMMERCIAL

## 2021-02-25 DIAGNOSIS — M53.3 PAIN IN THE COCCYX: ICD-10-CM

## 2021-02-25 DIAGNOSIS — M25.561 ACUTE PAIN OF RIGHT KNEE: ICD-10-CM

## 2021-02-25 PROCEDURE — 97112 NEUROMUSCULAR REEDUCATION: CPT | Mod: GP | Performed by: PHYSICAL THERAPY ASSISTANT

## 2021-02-25 PROCEDURE — 97110 THERAPEUTIC EXERCISES: CPT | Mod: GP | Performed by: PHYSICAL THERAPY ASSISTANT

## 2021-02-25 ASSESSMENT — ACTIVITIES OF DAILY LIVING (ADL)
PAIN: I HAVE THE SYMPTOM BUT IT DOES NOT AFFECT MY ACTIVITY
RAW_SCORE: 46
SWELLING: THE SYMPTOM AFFECTS MY ACTIVITY SLIGHTLY
SQUAT: ACTIVITY IS SOMEWHAT DIFFICULT
KNEE_ACTIVITY_OF_DAILY_LIVING_SUM: 46
RISE FROM A CHAIR: ACTIVITY IS NOT DIFFICULT
GIVING WAY, BUCKLING OR SHIFTING OF KNEE: THE SYMPTOM AFFECTS MY ACTIVITY MODERATELY
GO UP STAIRS: ACTIVITY IS SOMEWHAT DIFFICULT
KNEEL ON THE FRONT OF YOUR KNEE: ACTIVITY IS SOMEWHAT DIFFICULT
WALK: ACTIVITY IS MINIMALLY DIFFICULT
LIMPING: THE SYMPTOM AFFECTS MY ACTIVITY MODERATELY
SIT WITH YOUR KNEE BENT: ACTIVITY IS SOMEWHAT DIFFICULT
STAND: ACTIVITY IS NOT DIFFICULT
GO DOWN STAIRS: ACTIVITY IS SOMEWHAT DIFFICULT
WEAKNESS: THE SYMPTOM AFFECTS MY ACTIVITY SLIGHTLY
STIFFNESS: THE SYMPTOM AFFECTS MY ACTIVITY SLIGHTLY
KNEE_ACTIVITY_OF_DAILY_LIVING_SCORE: 65.71

## 2021-03-03 ENCOUNTER — THERAPY VISIT (OUTPATIENT)
Dept: PHYSICAL THERAPY | Facility: CLINIC | Age: 56
End: 2021-03-03
Payer: COMMERCIAL

## 2021-03-03 DIAGNOSIS — M25.561 ACUTE PAIN OF RIGHT KNEE: ICD-10-CM

## 2021-03-03 DIAGNOSIS — M53.3 PAIN IN THE COCCYX: ICD-10-CM

## 2021-03-03 PROCEDURE — 97112 NEUROMUSCULAR REEDUCATION: CPT | Mod: GP | Performed by: PHYSICAL THERAPY ASSISTANT

## 2021-03-03 PROCEDURE — 97110 THERAPEUTIC EXERCISES: CPT | Mod: GP | Performed by: PHYSICAL THERAPY ASSISTANT

## 2021-03-08 ENCOUNTER — THERAPY VISIT (OUTPATIENT)
Dept: PHYSICAL THERAPY | Facility: CLINIC | Age: 56
End: 2021-03-08
Payer: COMMERCIAL

## 2021-03-08 DIAGNOSIS — M53.3 PAIN IN THE COCCYX: ICD-10-CM

## 2021-03-08 DIAGNOSIS — M25.561 ACUTE PAIN OF RIGHT KNEE: ICD-10-CM

## 2021-03-08 PROCEDURE — 97112 NEUROMUSCULAR REEDUCATION: CPT | Mod: GP | Performed by: PHYSICAL THERAPIST

## 2021-03-08 PROCEDURE — 97140 MANUAL THERAPY 1/> REGIONS: CPT | Mod: GP | Performed by: PHYSICAL THERAPIST

## 2021-03-08 PROCEDURE — 97110 THERAPEUTIC EXERCISES: CPT | Mod: GP | Performed by: PHYSICAL THERAPIST

## 2021-03-11 ENCOUNTER — THERAPY VISIT (OUTPATIENT)
Dept: PHYSICAL THERAPY | Facility: CLINIC | Age: 56
End: 2021-03-11
Payer: COMMERCIAL

## 2021-03-11 DIAGNOSIS — M53.3 PAIN IN THE COCCYX: ICD-10-CM

## 2021-03-11 DIAGNOSIS — M25.561 ACUTE PAIN OF RIGHT KNEE: ICD-10-CM

## 2021-03-11 PROCEDURE — 97110 THERAPEUTIC EXERCISES: CPT | Mod: GP | Performed by: PHYSICAL THERAPY ASSISTANT

## 2021-03-11 PROCEDURE — 97112 NEUROMUSCULAR REEDUCATION: CPT | Mod: GP | Performed by: PHYSICAL THERAPY ASSISTANT

## 2021-03-16 ENCOUNTER — THERAPY VISIT (OUTPATIENT)
Dept: PHYSICAL THERAPY | Facility: CLINIC | Age: 56
End: 2021-03-16
Payer: COMMERCIAL

## 2021-03-16 DIAGNOSIS — M25.561 ACUTE PAIN OF RIGHT KNEE: ICD-10-CM

## 2021-03-16 DIAGNOSIS — M53.3 PAIN IN THE COCCYX: ICD-10-CM

## 2021-03-16 PROCEDURE — 97110 THERAPEUTIC EXERCISES: CPT | Mod: GP | Performed by: PHYSICAL THERAPY ASSISTANT

## 2021-03-16 PROCEDURE — 97112 NEUROMUSCULAR REEDUCATION: CPT | Mod: GP | Performed by: PHYSICAL THERAPY ASSISTANT

## 2021-03-19 ENCOUNTER — THERAPY VISIT (OUTPATIENT)
Dept: OCCUPATIONAL THERAPY | Facility: CLINIC | Age: 56
End: 2021-03-19
Payer: COMMERCIAL

## 2021-03-19 DIAGNOSIS — M79.641 PAIN OF RIGHT HAND: ICD-10-CM

## 2021-03-19 PROCEDURE — 97110 THERAPEUTIC EXERCISES: CPT | Mod: GO | Performed by: OCCUPATIONAL THERAPIST

## 2021-03-19 PROCEDURE — 97140 MANUAL THERAPY 1/> REGIONS: CPT | Mod: GO | Performed by: OCCUPATIONAL THERAPIST

## 2021-03-19 PROCEDURE — 97760 ORTHOTIC MGMT&TRAING 1ST ENC: CPT | Mod: GO | Performed by: OCCUPATIONAL THERAPIST

## 2021-03-19 PROCEDURE — 97165 OT EVAL LOW COMPLEX 30 MIN: CPT | Mod: GO | Performed by: OCCUPATIONAL THERAPIST

## 2021-03-19 NOTE — LETTER
[facility]  [address] [facility] [facility] [address] [facility] [address] [facility]  [address]  [facility] [address]  [phone]    2021    Re: Selene Lyn   :   1965  MRN:  [id number]   REFERRING PHYSICIAN:   Amparo SCHAFFER  [facility]    Date of Initial Evaluation:  2021  Visits:  Rxs Used: 1  Reason for Referral:  Pain of right hand    Hand Therapy Initial Evaluation  Current Date:  3/19/2021    Subjective:  Selene Lyn is a 56 year old R hand dominant female.    Diagnosis:R hand pain  DOI:  2020 (MD order date 21)    Patient reports symptoms of pain, stiffness/loss of motion and edema of the R long & ring finger which occurred due to gradual onset. Since onset symptoms are gradually getting better. Special tests:  x-ray.  Previous treatment: heat, cold, advil. General health as reported by patient is good.  Pertinent medical history includes: Asthma, Menopausal, Sleep Disorder/Apnea.  Medical allergies: penicillin, sulfur, erthomicin.  Surgical history: other: oblation.  Medication history: Gerd.    Occupational Profile Information:  Current occupation is   Currently working in normal job without restrictions  Job Tasks: Computer Work, Prolonged Sitting, Repetitive Tasks  Prior functional level:  no limitations  Barriers include:none  Mobility: No difficulty  Transportation: drives  Leisure activities/hobbies: golf, reading, TV, walking    Upper Extremity Functional Index Score:  SCORE:   Column Totals: /80: 79   (A lower score indicates greater disability.)    Pain Level (Scale 0-10):   3/19/2021   At Rest 1   With Use 5             Re: Selene Lyn   :   1965    Pain Description:  Date 3/19/2021   Location long finger and ring finger PIP   Pain Quality Pulling, aching   Frequency constant     Pain is worst  daytime, mostly in the morning   Exacerbated by  none   Relieved by none   Progression Gradually getting better     Edema (Circumference measured in cm)    3/19/2021 3/19/2021    L R   Long P1 4.9 5.2   PIP 5.3 5.8   P2 4.5 4.5   Ring P1 4.5 4.9   PIP 5.0 5.4   P2 4.0 4.0     Sensation   WNL throughout all nerve distributions; per patient report    ROM  Long Finger 3/19/2021 3/19/2021   AROM (PROM) L R   MCP 0/94 0/90   PIP 0/100 -10/105   DIP 0/80 0/76   GALLOWAY 274 261     Ring Finger 3/19/2021 3/19/2021   AROM (PROM) L R   MCP 0/90 0/89   PIP 0/102 -14/105   DIP 0/71 0/61   GALLOWAY 263 241     Intrinsic Tightness  Present in R long and ring fingers    Strength   (Measured in pounds)  Pain Report: 0-10/10   3/19/2021 3/19/2021   Trials L R   1  2  3 49 44   Average 49 44             Re:  Lyn   :   1965    3 Pt Pinch 3/19/2021 3/19/2021   Trials L R   1  2  3 10 12   Average 10 12     Assessment:  Patient presents with symptoms consistent with diagnosis of right long finger and ring finger pain, with conservative intervention.     Patient's limitations or Problem List includes:  Pain, Decreased ROM/motion, Increased edema, Weakness, Decreased  and Tightness in musculature of the right long finger and ring finger which interferes with the patient's ability to perform Recreational Activities as compared to previous level of function.    Rehab Potential:  Excellent - Return to full activity, no limitations    Patient will benefit from skilled Occupational Therapy to increase ROM,  strength and pinch strength and decrease pain and edema to return to previous activity level and resume normal daily tasks and to reach their rehab potential.    Barriers to Learning:  No barrier    Communication Issues:  Patient appears to be able to clearly communicate and understand verbal and written communication and follow directions correctly.    Chart Review: Brief history including review of medical and/or therapy records relating to the presenting problem    Identified Performance Deficits: leisure activities    Assessment of Occupational Performance:  1-3  Performance Deficits    Clinical Decision Making (Complexity): Low complexity    Treatment Explanation:  The following has been discussed with the patient:  RX ordered/plan of care  Anticipated outcomes  Possible risks and side effects    Plan:  Frequency:  1 X week, once daily  Duration:  for 6 weeks    Treatment Plan:    Modalities:    US and Iontophoresis   Therapeutic Exercise:    PROM, Tendon Gliding, Isotonics and Isometrics  Therapeutic Activities:   Functional activities   Neuromuscular re-ed:   Nerve Gliding and Isometrics  Manual Techniques:   Joint mobilization, Myofascial release and Manual edema mobilization    Re: Selene Lyn   :   1965    Orthotic Fabrication:    Finger based orthosis    Discharge Plan:  Achieve all LTG.  Independent in home treatment program.  Reach maximal therapeutic benefit.    Home Exercise Program  Ball Massage to Flexors   EMR Notes   HEP -  Sets   Reps   Sessions per day 2x/day   Notes 3-5 minutes   Finger Passive Range of Motion Tracking for Finger Extension on Table With Assist (#2)   EMR Notes   HEP -  Sets 1   Reps 30   Sessions per day   Notes 2x/day   Finger Passive Range of Motion Hook/IP Joint Flexion   EMR Notes   HEP -  Sets 1   Reps 10   Sessions per day 2x/day, 10 second hold   Notes   Finger Passive Range of Motion Composite Flexion   EMR Notes   HEP -  Sets 1   Reps 10   Sessions per day 2x/day, 10 second hold   Notes   Finger Active Range of Motion Tendon Glides Fist Series   EMR Notes   HEP -  Sets 1   Reps 5-10 reps   Sessions per day 3-4   Notes Keep wrist straight Hold end position for 5 seconds     Next Visit  MFR  PROM  Review HEP        Thank you for your referral.    INQUIRIES  Therapist: AMAN Bolden,CHT  [facility]  [id number] [place] [address]  [address] [place] [address]  Phone: [phone]  Fax: [phone]

## 2021-03-19 NOTE — PROGRESS NOTES
Hand Therapy Initial Evaluation  Current Date:  3/19/2021    Subjective:  Selene Lyn is a 56 year old R hand dominant female.    Diagnosis:R hand pain  DOI:  June 2020 (MD order date 2/25/21)    Patient reports symptoms of pain, stiffness/loss of motion and edema of the R long & ring finger which occurred due to gradual onset. Since onset symptoms are gradually getting better. Special tests:  x-ray.  Previous treatment: heat, cold, advil. General health as reported by patient is good.  Pertinent medical history includes: Asthma, Menopausal, Sleep Disorder/Apnea.  Medical allergies: penicillin, sulfur, erthomicin.  Surgical history: other: oblation.  Medication history: Gerd.    Occupational Profile Information:  Current occupation is   Currently working in normal job without restrictions  Job Tasks: Computer Work, Prolonged Sitting, Repetitive Tasks  Prior functional level:  no limitations  Barriers include:none  Mobility: No difficulty  Transportation: drives  Leisure activities/hobbies: golf, reading, TV, walking    Upper Extremity Functional Index Score:  SCORE:   Column Totals: /80: 79   (A lower score indicates greater disability.)    Pain Level (Scale 0-10):   3/19/2021   At Rest 1   With Use 5     Pain Description:  Date 3/19/2021   Location long finger and ring finger PIP   Pain Quality Pulling, aching   Frequency constant     Pain is worst  daytime, mostly in the morning   Exacerbated by  none   Relieved by none   Progression Gradually getting better     Edema (Circumference measured in cm)   3/19/2021 3/19/2021    L R   Long P1 4.9 5.2   PIP 5.3 5.8   P2 4.5 4.5   Ring P1 4.5 4.9   PIP 5.0 5.4   P2 4.0 4.0     Sensation   WNL throughout all nerve distributions; per patient report    ROM  Long Finger 3/19/2021 3/19/2021   AROM (PROM) L R   MCP 0/94 0/90   PIP 0/100 -10/105   DIP 0/80 0/76   GALLOWAY 274 261     Ring Finger 3/19/2021 3/19/2021   AROM (PROM) L R   MCP 0/90 0/89   PIP 0/102 -14/105    DIP 0/71 0/61   GALLOWAY 263 150     Intrinsic Tightness  Present in R long and ring fingers    Strength   (Measured in pounds)  Pain Report: 0-10/10   3/19/2021 3/19/2021   Trials L R   1  2  3 49 44   Average 49 44     3 Pt Pinch 3/19/2021 3/19/2021   Trials L R   1  2  3 10 12   Average 10 12     Assessment:  Patient presents with symptoms consistent with diagnosis of right long finger and ring finger pain, with conservative intervention.     Patient's limitations or Problem List includes:  Pain, Decreased ROM/motion, Increased edema, Weakness, Decreased  and Tightness in musculature of the right long finger and ring finger which interferes with the patient's ability to perform Recreational Activities as compared to previous level of function.    Rehab Potential:  Excellent - Return to full activity, no limitations    Patient will benefit from skilled Occupational Therapy to increase ROM,  strength and pinch strength and decrease pain and edema to return to previous activity level and resume normal daily tasks and to reach their rehab potential.    Barriers to Learning:  No barrier    Communication Issues:  Patient appears to be able to clearly communicate and understand verbal and written communication and follow directions correctly.    Chart Review: Brief history including review of medical and/or therapy records relating to the presenting problem    Identified Performance Deficits: leisure activities    Assessment of Occupational Performance:  1-3 Performance Deficits    Clinical Decision Making (Complexity): Low complexity    Treatment Explanation:  The following has been discussed with the patient:  RX ordered/plan of care  Anticipated outcomes  Possible risks and side effects    Plan:  Frequency:  1 X week, once daily  Duration:  for 6 weeks    Treatment Plan:    Modalities:    US and Iontophoresis   Therapeutic Exercise:    PROM, Tendon Gliding, Isotonics and Isometrics  Therapeutic  Activities:   Functional activities   Neuromuscular re-ed:   Nerve Gliding and Isometrics  Manual Techniques:   Joint mobilization, Myofascial release and Manual edema mobilization  Orthotic Fabrication:    Finger based orthosis    Discharge Plan:  Achieve all LTG.  Independent in home treatment program.  Reach maximal therapeutic benefit.    Home Exercise Program  Ball Massage to Flexors   EMR Notes   HEP -  Sets   Reps   Sessions per day 2x/day   Notes 3-5 minutes   Finger Passive Range of Motion Tracking for Finger Extension on Table With Assist (#2)   EMR Notes   HEP -  Sets 1   Reps 30   Sessions per day   Notes 2x/day   Finger Passive Range of Motion Hook/IP Joint Flexion   EMR Notes   HEP -  Sets 1   Reps 10   Sessions per day 2x/day, 10 second hold   Notes   Finger Passive Range of Motion Composite Flexion   EMR Notes   HEP -  Sets 1   Reps 10   Sessions per day 2x/day, 10 second hold   Notes   Finger Active Range of Motion Tendon Glides Fist Series   EMR Notes   HEP -  Sets 1   Reps 5-10 reps   Sessions per day 3-4   Notes Keep wrist straight Hold end position for 5 seconds     Next Visit  MFR  PROM  Review HEP

## 2021-03-23 ENCOUNTER — THERAPY VISIT (OUTPATIENT)
Dept: PHYSICAL THERAPY | Facility: CLINIC | Age: 56
End: 2021-03-23
Payer: COMMERCIAL

## 2021-03-23 DIAGNOSIS — M25.561 ACUTE PAIN OF RIGHT KNEE: ICD-10-CM

## 2021-03-23 DIAGNOSIS — M53.3 PAIN IN THE COCCYX: ICD-10-CM

## 2021-03-23 PROCEDURE — 97110 THERAPEUTIC EXERCISES: CPT | Mod: GP | Performed by: PHYSICAL THERAPY ASSISTANT

## 2021-03-26 ENCOUNTER — THERAPY VISIT (OUTPATIENT)
Dept: OCCUPATIONAL THERAPY | Facility: CLINIC | Age: 56
End: 2021-03-26
Payer: COMMERCIAL

## 2021-03-26 DIAGNOSIS — M79.641 PAIN OF RIGHT HAND: ICD-10-CM

## 2021-03-26 PROCEDURE — 97140 MANUAL THERAPY 1/> REGIONS: CPT | Mod: GO | Performed by: OCCUPATIONAL THERAPIST

## 2021-03-26 PROCEDURE — 97110 THERAPEUTIC EXERCISES: CPT | Mod: GO | Performed by: OCCUPATIONAL THERAPIST

## 2021-03-27 ENCOUNTER — OFFICE VISIT (OUTPATIENT)
Dept: URGENT CARE | Facility: URGENT CARE | Age: 56
End: 2021-03-27
Payer: COMMERCIAL

## 2021-03-27 VITALS
RESPIRATION RATE: 20 BRPM | OXYGEN SATURATION: 98 % | TEMPERATURE: 98.2 F | DIASTOLIC BLOOD PRESSURE: 68 MMHG | HEART RATE: 78 BPM | SYSTOLIC BLOOD PRESSURE: 107 MMHG

## 2021-03-27 DIAGNOSIS — M79.10 MUSCLE PAIN: Primary | ICD-10-CM

## 2021-03-27 PROCEDURE — 99207 PR NON-BILLABLE SERV PER CHARTING: CPT | Performed by: STUDENT IN AN ORGANIZED HEALTH CARE EDUCATION/TRAINING PROGRAM

## 2021-03-27 NOTE — PROGRESS NOTES
SUBJECTIVE:  Chief Complaint   Patient presents with     Urgent Care     Musculoskeletal Problem     L ankle swollen and pain know injury X8 days      Selene M Riki is a 56 year old female who presents with a chief complaint of ankle pain. Started about 1 week ago. Keep thinking it was feeling better, but it seems to keep coming back. Woke up with the pain on morning. Denies any trauma. Located on the top of her foot and sometimes in the back. Has not noticed anything that makes it better. Tried taking Advil - about 6 tablets per day. Going for a walk makes the pain worse. Tried doing some exercises that her PT gave her which she thinks made it worse. Went on a long walk a few days ago because it felt better and the next day it felt a lot worse again. No history of gout. Is able to sleep through the night.     Past Medical History:   Diagnosis Date     Allergic rhinitis 4/20/2009     Problem list name updated by automated process. Provider to review     Diffuse cystic mastopathy      Mild intermittent asthma     alleries triggers include smells and seasonal allergies     Tachycardia, unspecified 12/99    ablated for re-entrant tachycardia     TMJ (temporomandibular joint syndrome) 1/11/2019     Current Outpatient Medications   Medication Sig Dispense Refill     albuterol (PROAIR HFA) 108 (90 Base) MCG/ACT inhaler Inhale 2 puffs into the lungs every 4 hours as needed for shortness of breath / dyspnea or wheezing 2 Inhaler 1     Cholecalciferol (VITAMIN D3)        fluticasone (FLOVENT HFA) 110 MCG/ACT inhaler Inhale 1 puff into the lungs 2 times daily During allergy season as needed 3 Inhaler 3     Multiple Vitamin (MULTIVITAMIN OR) Take  by mouth.       Naproxen Sodium (ALEVE PO)        olopatadine (PATANOL) 0.1 % ophthalmic solution Place 1 drop into the right eye 2 times daily 1 Bottle 3     pantoprazole (PROTONIX) 40 MG EC tablet Take 1 tablet (40 mg) by mouth daily DO NOT CRUSH. 90 tablet 3     VITAMIN E NATURAL  PO        Social History     Tobacco Use     Smoking status: Never Smoker     Smokeless tobacco: Never Used     Tobacco comment: exposed as a child but never smoked   Substance Use Topics     Alcohol use: Yes     Alcohol/week: 0.0 standard drinks     Comment: one drink every 3 months/        ROS:  Review of systems negative except as stated below    EXAM:   /68   Pulse 78   Temp 98.2  F (36.8  C) (Tympanic)   Resp 20   LMP  (LMP Unknown)   SpO2 98%   M/S Exam: minimal ankle swelling to medial side of ankle compared to R. No point tenderness over 5th metatarsal, medial or lateral malleolus. Active and passive ROM full. Mild tenderness to area between medial malleolus and achilles tendon. Gait normal. No tenderness to left knee (low concern for high ankle sprain).   GENERAL APPEARANCE: healthy, alert and no distress  CV: regular rate and rhythm  SKIN: no suspicious lesions or rashes  NEURO: Normal strength and tone, sensory exam grossly normal, mentation intact and speech normal    X-RAY was not done. Gustavus ankle rules - unremarkable.     ASSESSMENT:  Likely a muscle strain vs ligamentous injury of L medial ankle. Unclear etiology, but low suspicion of gout given no redness or changes of skin color. No rashes noted. Gustavus ankle rules unremarkable and no history of trauma making bony injury less likely. Ibuprofen somewhat helps pain and more activity worsens it. likely MSK in nature. Recommended symptomatic treatment (Tylenol, ibuprofen, ice, activity modification) and close f/u w/ PCP next week if symptoms worsen or fail to improve. Patient agreed and understood the plan.     PLAN:  - Symptomatic treatment (Tylenol, ibuprofen, ice, activity modification)  - F/u w/ PCP next week if symptoms worsen or fail to improve.

## 2021-03-27 NOTE — PATIENT INSTRUCTIONS
You may alternate between Tylenol 500mg and ibuprofen 400mg every 6 hours as needed for ankle pain. Start to ice your ankle at least 3-4 times per day for 15 minutes to help with pain. Stop doing the stretches and continue to slowly increase activities as tolerated. Please follow up with your primary care doctor this next week if your symptoms worsen or fail to improve.

## 2021-03-29 ENCOUNTER — THERAPY VISIT (OUTPATIENT)
Dept: PHYSICAL THERAPY | Facility: CLINIC | Age: 56
End: 2021-03-29
Payer: COMMERCIAL

## 2021-03-29 DIAGNOSIS — M25.561 ACUTE PAIN OF RIGHT KNEE: ICD-10-CM

## 2021-03-29 DIAGNOSIS — M53.3 PAIN IN THE COCCYX: ICD-10-CM

## 2021-03-29 PROCEDURE — 97110 THERAPEUTIC EXERCISES: CPT | Mod: GP | Performed by: PHYSICAL THERAPIST

## 2021-03-29 PROCEDURE — 97140 MANUAL THERAPY 1/> REGIONS: CPT | Mod: GP | Performed by: PHYSICAL THERAPIST

## 2021-04-01 ENCOUNTER — THERAPY VISIT (OUTPATIENT)
Dept: OCCUPATIONAL THERAPY | Facility: CLINIC | Age: 56
End: 2021-04-01
Payer: COMMERCIAL

## 2021-04-01 DIAGNOSIS — M79.641 PAIN OF RIGHT HAND: ICD-10-CM

## 2021-04-01 PROCEDURE — 97110 THERAPEUTIC EXERCISES: CPT | Mod: GO | Performed by: OCCUPATIONAL THERAPIST

## 2021-04-01 PROCEDURE — 97140 MANUAL THERAPY 1/> REGIONS: CPT | Mod: GO | Performed by: OCCUPATIONAL THERAPIST

## 2021-04-01 NOTE — PROGRESS NOTES
Objective note 4/1/21    Pain Level (Scale 0-10):   3/19/2021 4/1/21   At Rest 1 0   With Use 5 4-5     Pain Description:  Date 3/19/2021   Location long finger and ring finger PIP   Pain Quality Pulling, aching   Frequency constant     Pain is worst  daytime, mostly in the morning   Exacerbated by  none   Relieved by none   Progression Gradually getting better     Edema (Circumference measured in cm)   3/19/2021 3/19/2021    L R   Long P1 4.9 5.2   PIP 5.3 5.8   P2 4.5 4.5   Ring P1 4.5 4.9   PIP 5.0 5.4   P2 4.0 4.0     Sensation   WNL throughout all nerve distributions; per patient report    ROM  Long Finger 3/19/2021 3/19/2021 4/1/21   AROM (PROM) L R R   MCP 0/94 0/90 /104   PIP 0/100 -10/105 -10/110   DIP 0/80 0/76 /86   GALLOWAY 274 261 290     Ring Finger 3/19/2021 3/19/2021 4/1/21   AROM (PROM) L R R   MCP 0/90 0/89 /95   PIP 0/102 -14/105 -14/112   DIP 0/71 0/61 /66   GALLOWAY 263 241 259     Please refer to the daily flowsheet for treatment today, total treatment time and time spent performing 1:1 timed codes.

## 2021-04-08 ENCOUNTER — THERAPY VISIT (OUTPATIENT)
Dept: PHYSICAL THERAPY | Facility: CLINIC | Age: 56
End: 2021-04-08
Payer: COMMERCIAL

## 2021-04-08 DIAGNOSIS — M53.3 PAIN IN THE COCCYX: ICD-10-CM

## 2021-04-08 DIAGNOSIS — M25.561 ACUTE PAIN OF RIGHT KNEE: ICD-10-CM

## 2021-04-08 PROCEDURE — 97110 THERAPEUTIC EXERCISES: CPT | Mod: GP | Performed by: PHYSICAL THERAPY ASSISTANT

## 2021-04-08 ASSESSMENT — ACTIVITIES OF DAILY LIVING (ADL)
SWELLING: I DO NOT HAVE THE SYMPTOM
LIMPING: I DO NOT HAVE THE SYMPTOM
HOW_WOULD_YOU_RATE_THE_CURRENT_FUNCTION_OF_YOUR_KNEE_DURING_YOUR_USUAL_DAILY_ACTIVITIES_ON_A_SCALE_FROM_0_TO_100_WITH_100_BEING_YOUR_LEVEL_OF_KNEE_FUNCTION_PRIOR_TO_YOUR_INJURY_AND_0_BEING_THE_INABILITY_TO_PERFORM_ANY_OF_YOUR_USUAL_DAILY_ACTIVITIES?: 85
KNEE_ACTIVITY_OF_DAILY_LIVING_SCORE: 82.86
RISE FROM A CHAIR: ACTIVITY IS NOT DIFFICULT
KNEE_ACTIVITY_OF_DAILY_LIVING_SUM: 58
STAND: ACTIVITY IS NOT DIFFICULT
SQUAT: ACTIVITY IS SOMEWHAT DIFFICULT
AS_A_RESULT_OF_YOUR_KNEE_INJURY,_HOW_WOULD_YOU_RATE_YOUR_CURRENT_LEVEL_OF_DAILY_ACTIVITY?: NEARLY NORMAL
GIVING WAY, BUCKLING OR SHIFTING OF KNEE: I HAVE THE SYMPTOM BUT IT DOES NOT AFFECT MY ACTIVITY
WALK: ACTIVITY IS NOT DIFFICULT
KNEEL ON THE FRONT OF YOUR KNEE: ACTIVITY IS SOMEWHAT DIFFICULT
PAIN: I HAVE THE SYMPTOM BUT IT DOES NOT AFFECT MY ACTIVITY
GO DOWN STAIRS: ACTIVITY IS SOMEWHAT DIFFICULT
HOW_WOULD_YOU_RATE_THE_OVERALL_FUNCTION_OF_YOUR_KNEE_DURING_YOUR_USUAL_DAILY_ACTIVITIES?: NEARLY NORMAL
STIFFNESS: I HAVE THE SYMPTOM BUT IT DOES NOT AFFECT MY ACTIVITY
GO UP STAIRS: ACTIVITY IS MINIMALLY DIFFICULT
RAW_SCORE: 58
WEAKNESS: I HAVE THE SYMPTOM BUT IT DOES NOT AFFECT MY ACTIVITY
SIT WITH YOUR KNEE BENT: ACTIVITY IS MINIMALLY DIFFICULT

## 2021-04-08 NOTE — PROGRESS NOTES
Subjective:  HPI  Physical Exam                    Objective:  System    Physical Exam    General     ROS    Assessment/Plan:    DISCHARGE REPORT    Progress reporting period is from 11/18/20 to 4/8/21.      SUBJECTIVE  Subjective changes noted by patient:  Patient reports that she still has times when her knee give out and does not fall.  The pain is in the distal aspect of the right knee knee and patella.  Step down ex increases the pain but not every time.  Overall she feels that she is 85% back to normal but still has the pain.   Current pain level is  1/10    Previous pain level was:   Initial Pain level: 7/10   Changes in function:  Yes (See Goal flowsheet attached for changes in current functional level)     Adverse reaction to treatment or activity: None     OBJECTIVE  Changes noted in objective findings:  Yes,  Right knee ROM 0-0-145,  MMT 5/5.  Patient has a good HEP and reviewed her exercises for home today.  Did discuss with the patient seeing a Orthopedist if the knee pain continues, but the exercises have shown improvement with her strength.

## 2021-04-15 ENCOUNTER — THERAPY VISIT (OUTPATIENT)
Dept: OCCUPATIONAL THERAPY | Facility: CLINIC | Age: 56
End: 2021-04-15
Payer: COMMERCIAL

## 2021-04-15 DIAGNOSIS — M79.641 PAIN OF RIGHT HAND: ICD-10-CM

## 2021-04-15 PROCEDURE — 97140 MANUAL THERAPY 1/> REGIONS: CPT | Mod: GO | Performed by: OCCUPATIONAL THERAPIST

## 2021-04-15 PROCEDURE — 97110 THERAPEUTIC EXERCISES: CPT | Mod: GO | Performed by: OCCUPATIONAL THERAPIST

## 2021-04-15 NOTE — PROGRESS NOTES
Objective note 4/15/21    Pain Level (Scale 0-10):   3/19/2021 4/1/21 4/15/21   At Rest 1 0 0   With Use 5 4-5 1-2     Pain Description:  Date 3/19/2021   Location long finger and ring finger PIP   Pain Quality Pulling, aching   Frequency constant     Pain is worst  daytime, mostly in the morning   Exacerbated by  none   Relieved by none   Progression Gradually getting better     Edema (Circumference measured in cm)   3/19/2021 3/19/2021    L R   Long P1 4.9 5.2   PIP 5.3 5.8   P2 4.5 4.5   Ring P1 4.5 4.9   PIP 5.0 5.4   P2 4.0 4.0     Sensation   WNL throughout all nerve distributions; per patient report    ROM  Long Finger 3/19/2021 3/19/2021 4/1/21 4/15/21   AROM (PROM) L R R R   MCP 0/94 0/90 /104 0/95   PIP 0/100 -10/105 -10/110 -5/109   DIP 0/80 0/76 /86 0/83   GALLOWAY 274 261 290 282     Ring Finger 3/19/2021 3/19/2021 4/1/21 4/15/21   AROM (PROM) L R R R   MCP 0/90 0/89 /95 97   PIP 0/102 -14/105 -14/112 -9/117   DIP 0/71 0/61 /66 64   GALLOWAY 263 241 259 269     Please refer to the daily flowsheet for treatment today, total treatment time and time spent performing 1:1 timed codes.

## 2021-04-19 ENCOUNTER — THERAPY VISIT (OUTPATIENT)
Dept: PHYSICAL THERAPY | Facility: CLINIC | Age: 56
End: 2021-04-19
Payer: COMMERCIAL

## 2021-04-19 DIAGNOSIS — M53.3 PAIN IN THE COCCYX: ICD-10-CM

## 2021-04-19 DIAGNOSIS — M25.561 ACUTE PAIN OF RIGHT KNEE: ICD-10-CM

## 2021-04-19 PROCEDURE — 97110 THERAPEUTIC EXERCISES: CPT | Mod: GP | Performed by: PHYSICAL THERAPIST

## 2021-04-19 PROCEDURE — 97140 MANUAL THERAPY 1/> REGIONS: CPT | Mod: GP | Performed by: PHYSICAL THERAPIST

## 2021-04-29 ENCOUNTER — THERAPY VISIT (OUTPATIENT)
Dept: OCCUPATIONAL THERAPY | Facility: CLINIC | Age: 56
End: 2021-04-29
Payer: COMMERCIAL

## 2021-04-29 DIAGNOSIS — M79.641 PAIN OF RIGHT HAND: ICD-10-CM

## 2021-04-29 PROCEDURE — 97140 MANUAL THERAPY 1/> REGIONS: CPT | Mod: GO | Performed by: OCCUPATIONAL THERAPIST

## 2021-04-29 PROCEDURE — 97110 THERAPEUTIC EXERCISES: CPT | Mod: GO | Performed by: OCCUPATIONAL THERAPIST

## 2021-04-29 NOTE — PROGRESS NOTES
Objective note 4/15/21    Pain Level (Scale 0-10):   3/19/2021 4/1/21 4/15/21 4/29/21   At Rest 1 0 0 0/10   With Use 5 4-5 1-2 3/10     Pain Description:  Date 3/19/2021 4/29/21   Location long finger and ring finger PIP Long and ring finger   Pain Quality Pulling, aching Stiff, sore occasionally   Frequency constant   intermittant   Pain is worst  daytime, mostly in the morning morning   Exacerbated by  none none   Relieved by none Stretching, splints   Progression Gradually getting better Getting better     Edema (Circumference measured in cm)   3/19/2021 3/19/2021    L R   Long P1 4.9 5.2   PIP 5.3 5.8   P2 4.5 4.5   Ring P1 4.5 4.9   PIP 5.0 5.4   P2 4.0 4.0     Sensation   WNL throughout all nerve distributions; per patient report    ROM  Long Finger 3/19/2021 3/19/2021 4/1/21 4/15/21 4/29/21   AROM (PROM) L R R R    MCP 0/94 0/90 /104 0/95    PIP 0/100 -10/105 -10/110 -5/109 -9/   DIP 0/80 0/76 /86 0/83    GALLOWAY 274 261 290 282      Ring Finger 3/19/2021 3/19/2021 4/1/21 4/15/21 4/29/21   AROM (PROM) L R R R    MCP 0/90 0/89 /95 97    PIP 0/102 -14/105 -14/112 -9/117 -7/   DIP 0/71 0/61 /66 64    GALLOWAY 263 241 259 269      Strength   (Measured in pounds)  Pain Report: 0-10/10   3/19/2021 3/19/2021 4/29/21   Trials L R R   1  2  3 49 44 50   Average 49 44      3 Pt Pinch 3/19/2021 3/19/2021   Trials L R   1  2  3 10 12   Average 10 12     Please refer to the daily flowsheet for treatment today, total treatment time and time spent performing 1:1 timed codes.

## 2021-05-18 ENCOUNTER — THERAPY VISIT (OUTPATIENT)
Dept: PHYSICAL THERAPY | Facility: CLINIC | Age: 56
End: 2021-05-18
Payer: COMMERCIAL

## 2021-05-18 DIAGNOSIS — M53.3 PAIN IN THE COCCYX: ICD-10-CM

## 2021-05-18 DIAGNOSIS — M25.561 ACUTE PAIN OF RIGHT KNEE: ICD-10-CM

## 2021-05-18 PROCEDURE — 97535 SELF CARE MNGMENT TRAINING: CPT | Mod: GP | Performed by: PHYSICAL THERAPIST

## 2021-05-18 PROCEDURE — 97530 THERAPEUTIC ACTIVITIES: CPT | Mod: GP | Performed by: PHYSICAL THERAPIST

## 2021-05-18 PROCEDURE — 97140 MANUAL THERAPY 1/> REGIONS: CPT | Mod: GP | Performed by: PHYSICAL THERAPIST

## 2021-05-18 NOTE — LETTER
[facility]  [address] [address]  [address]  [facility] [address]  [phone]    May 18, 2021    Re: Selene Lyn   :   1965  MRN:  [id number]   REFERRING PHYSICIAN:   Amparo GUZMAN [facility]    Date of Initial Evaluation:  2020  Visits:  Rxs Used: 20  Reason for Referral:     Acute pain of right knee  Pain in the coccyx    EVALUATION SUMMARY    Assessment/Plan:    PROGRESS  REPORT    Progress reporting period is from 21.       SUBJECTIVE  Subjective changes noted by patient:  Subjective: More surface tenderness to tailbone the past 2 weeks, not sure why. Also continues to have urgency/urge continence occasionally, slight LEXIS also. Voiding every 2 hours during day.     Current pain level is 3/10  .     Previous pain level was   Initial Pain level: 7/10.   Changes in function:  Yes (See Goal flowsheet attached for changes in current functional level)  Adverse reaction to treatment or activity: activity - sitting    OBJECTIVE  Changes noted in objective findings:  Yes,   Objective: Discussed importance of urge suppression. Coccyx position slight flexion. TTP along distal sacrum. Kegel strength 3/5.      ASSESSMENT/PLAN  Updated problem list and treatment plan: Diagnosis 1:  Coccyx pain  Pain -  hot/cold therapy, manual therapy, self management, education, directional preference exercise and home program  Decreased ROM/flexibility - manual therapy and therapeutic exercise  Decreased joint mobility - manual therapy and therapeutic exercise  Decreased strength - therapeutic exercise and therapeutic activities  Decreased proprioception - neuro re-education and therapeutic activities  Inflammation - self management/home program  Impaired muscle performance - neuro re-education  Decreased function - therapeutic activities  STG/LTGs have been met or progress has been made towards goals:  Yes (See Goal  flow sheet completed today.)  Assessment of Progress: The patient's condition is improving.        Re: Selene Lyn   :   1965  MRN:  [id number]    Self Management Plans:  Patient has been instructed in a home treatment program.  Patient  has been instructed in self management of symptoms.    Selene continues to require the following intervention to meet STG and LTG's:  PT    Recommendations:  This patient would benefit from continued therapy.     Frequency:  1 X a month, once daily  Duration:  for 2 months    Please refer to the daily flowsheet for treatment today, total treatment time and time spent performing 1:1 timed codes.    Thank you for your referral.    INQUIRIES  Therapist: Nicki Kapadia, MS, PT, [place] [id number]  Phone: [phone]  Fax: [phone]

## 2021-05-18 NOTE — PROGRESS NOTES
Subjective:  HPI  Physical Exam                    Objective:  System    Physical Exam    General     ROS    Assessment/Plan:    PROGRESS  REPORT    Progress reporting period is from 05/18/21.       SUBJECTIVE  Subjective changes noted by patient:  Subjective: More surface tenderness to tailbone the past 2 weeks, not sure why. Also continues to have urgency/urge continence occasionally, slight LEXIS also. Voiding every 2 hours during day.     Current pain level is 3/10  .     Previous pain level was   Initial Pain level: 7/10.   Changes in function:  Yes (See Goal flowsheet attached for changes in current functional level)  Adverse reaction to treatment or activity: activity - sitting    OBJECTIVE  Changes noted in objective findings:  Yes,   Objective: Discussed importance of urge suppression. Coccyx position slight flexion. TTP along distal sacrum. Kegel strength 3/5.      ASSESSMENT/PLAN  Updated problem list and treatment plan: Diagnosis 1:  Coccyx pain  Pain -  hot/cold therapy, manual therapy, self management, education, directional preference exercise and home program  Decreased ROM/flexibility - manual therapy and therapeutic exercise  Decreased joint mobility - manual therapy and therapeutic exercise  Decreased strength - therapeutic exercise and therapeutic activities  Decreased proprioception - neuro re-education and therapeutic activities  Inflammation - self management/home program  Impaired muscle performance - neuro re-education  Decreased function - therapeutic activities  STG/LTGs have been met or progress has been made towards goals:  Yes (See Goal flow sheet completed today.)  Assessment of Progress: The patient's condition is improving.  Self Management Plans:  Patient has been instructed in a home treatment program.  Patient  has been instructed in self management of symptoms.    Selene continues to require the following intervention to meet STG and LTG's:  PT    Recommendations:  This patient would  benefit from continued therapy.     Frequency:  1 X a month, once daily  Duration:  for 2 months        Please refer to the daily flowsheet for treatment today, total treatment time and time spent performing 1:1 timed codes.

## 2021-05-28 ENCOUNTER — RECORDS - HEALTHEAST (OUTPATIENT)
Dept: ADMINISTRATIVE | Facility: CLINIC | Age: 56
End: 2021-05-28

## 2021-05-29 ENCOUNTER — RECORDS - HEALTHEAST (OUTPATIENT)
Dept: ADMINISTRATIVE | Facility: CLINIC | Age: 56
End: 2021-05-29

## 2021-06-15 ENCOUNTER — THERAPY VISIT (OUTPATIENT)
Dept: PHYSICAL THERAPY | Facility: CLINIC | Age: 56
End: 2021-06-15
Payer: COMMERCIAL

## 2021-06-15 DIAGNOSIS — M25.561 ACUTE PAIN OF RIGHT KNEE: ICD-10-CM

## 2021-06-15 DIAGNOSIS — M53.3 PAIN IN THE COCCYX: ICD-10-CM

## 2021-06-15 PROCEDURE — 97035 APP MDLTY 1+ULTRASOUND EA 15: CPT | Mod: GP | Performed by: PHYSICAL THERAPIST

## 2021-06-15 PROCEDURE — 97140 MANUAL THERAPY 1/> REGIONS: CPT | Mod: GP | Performed by: PHYSICAL THERAPIST

## 2021-07-15 ENCOUNTER — THERAPY VISIT (OUTPATIENT)
Dept: PHYSICAL THERAPY | Facility: CLINIC | Age: 56
End: 2021-07-15
Payer: COMMERCIAL

## 2021-07-15 DIAGNOSIS — M25.561 ACUTE PAIN OF RIGHT KNEE: ICD-10-CM

## 2021-07-15 DIAGNOSIS — M53.3 PAIN IN THE COCCYX: ICD-10-CM

## 2021-07-15 PROCEDURE — 97140 MANUAL THERAPY 1/> REGIONS: CPT | Mod: GP | Performed by: PHYSICAL THERAPIST

## 2021-07-15 PROCEDURE — 97110 THERAPEUTIC EXERCISES: CPT | Mod: GP | Performed by: PHYSICAL THERAPIST

## 2021-07-15 NOTE — LETTER
THOM[facility]  [address]  [address]  [place] [address]  [phone]    July 15, 2021    Re: Selene Lyn   :   1965  MRN:  [id number]   REFERRING PHYSICIAN:   Amparo GUZMAN [facility]    Date of Initial Evaluation:  2020  Visits:  Rxs Used: 22  Reason for Referral:     Acute pain of right knee  Pain in the coccyx    EVALUATION SUMMARY    Assessment/Plan:    PROGRESS  REPORT    Progress reporting period is from 07/15/21.       SUBJECTIVE  Subjective changes noted by patient:  .  Subjective: Flared up the past 1-2 weeks. Maybe due to doing more walking fast/speed. Will be transitioning Aug 1st to back at work in office vs at home. Still some slight urgency/frequency. Leakage intermittent. Hadn't really been doing HEP as regularly.     Current pain level is  Current Pain level: 5/10.     Previous pain level was   Initial Pain level: 7/10.   Changes in function:  Yes (See Goal flowsheet attached for changes in current functional level)  Adverse reaction to treatment or activity: activity - possibly walking fast?    OBJECTIVE  Changes noted in objective findings:  Yes,   Objective: TTP along paraspinals, sacrum, tip of coccyx. Rev'd HEP to focus on 3-4 most important ones. Lumbar AROM ext mod loss, flexion WNL. Coccyx in flexed position but not worse. Discussed to take short choppy steps vs long strides.      ASSESSMENT/PLAN  Updated problem list and treatment plan: Diagnosis 1:  Coccyx/LBP  Pain -  hot/cold therapy, US, manual therapy, splint/taping/bracing/orthotics, self management, education, directional preference exercise and home program  Decreased ROM/flexibility - manual therapy and therapeutic exercise  Decreased joint mobility - manual therapy and therapeutic exercise  Decreased strength - therapeutic exercise and therapeutic activities  Inflammation - US and self management/home program  Impaired  muscle performance - neuro re-education            Re: Selene Lyn   :   1965  MRN:  [id number]     Decreased function - therapeutic activities  Impaired posture - neuro re-education  STG/LTGs have been met or progress has been made towards goals:  Yes (See Goal flow sheet completed today.)  Assessment of Progress: The patient's condition has exacerbated.  Self Management Plans:  Patient has been instructed in a home treatment program.  Patient  has been instructed in self management of symptoms.    Selene continues to require the following intervention to meet STG and LTG's:  PT    Recommendations:  This patient would benefit from continued therapy.     Frequency:  1 X a month, once daily  Duration:  for 2 months    Please refer to the daily flowsheet for treatment today, total treatment time and time spent performing 1:1 timed codes.    Thank you for your referral.    INQUIRIES  Therapist: Nicki Kapadia, MS, PT, [facility] [address] [address]  Phone: [phone]  Fax: [phone]

## 2021-07-15 NOTE — PROGRESS NOTES
Subjective:  HPI  Physical Exam                    Objective:  System    Physical Exam    General     ROS    Assessment/Plan:    PROGRESS  REPORT    Progress reporting period is from 07/15/21.       SUBJECTIVE  Subjective changes noted by patient:  .  Subjective: Flared up the past 1-2 weeks. Maybe due to doing more walking fast/speed. Will be transitioning Aug 1st to back at work in office vs at home. Still some slight urgency/frequency. Leakage intermittent. Hadn't really been doing HEP as regularly.     Current pain level is  Current Pain level: 5/10.     Previous pain level was   Initial Pain level: 7/10.   Changes in function:  Yes (See Goal flowsheet attached for changes in current functional level)  Adverse reaction to treatment or activity: activity - possibly walking fast?    OBJECTIVE  Changes noted in objective findings:  Yes,   Objective: TTP along paraspinals, sacrum, tip of coccyx. Rev'd HEP to focus on 3-4 most important ones. Lumbar AROM ext mod loss, flexion WNL. Coccyx in flexed position but not worse. Discussed to take short choppy steps vs long strides.      ASSESSMENT/PLAN  Updated problem list and treatment plan: Diagnosis 1:  Coccyx/LBP  Pain -  hot/cold therapy, US, manual therapy, splint/taping/bracing/orthotics, self management, education, directional preference exercise and home program  Decreased ROM/flexibility - manual therapy and therapeutic exercise  Decreased joint mobility - manual therapy and therapeutic exercise  Decreased strength - therapeutic exercise and therapeutic activities  Inflammation - US and self management/home program  Impaired muscle performance - neuro re-education  Decreased function - therapeutic activities  Impaired posture - neuro re-education  STG/LTGs have been met or progress has been made towards goals:  Yes (See Goal flow sheet completed today.)  Assessment of Progress: The patient's condition has exacerbated.  Self Management Plans:  Patient has been  instructed in a home treatment program.  Patient  has been instructed in self management of symptoms.    Selene continues to require the following intervention to meet STG and LTG's:  PT    Recommendations:  This patient would benefit from continued therapy.     Frequency:  1 X a month, once daily  Duration:  for 2 months        Please refer to the daily flowsheet for treatment today, total treatment time and time spent performing 1:1 timed codes.

## 2021-08-03 ENCOUNTER — THERAPY VISIT (OUTPATIENT)
Dept: PHYSICAL THERAPY | Facility: CLINIC | Age: 56
End: 2021-08-03
Payer: COMMERCIAL

## 2021-08-03 DIAGNOSIS — M25.561 ACUTE PAIN OF RIGHT KNEE: ICD-10-CM

## 2021-08-03 DIAGNOSIS — M53.3 PAIN IN THE COCCYX: ICD-10-CM

## 2021-08-03 PROCEDURE — 97035 APP MDLTY 1+ULTRASOUND EA 15: CPT | Mod: GP | Performed by: PHYSICAL THERAPIST

## 2021-08-03 PROCEDURE — 97110 THERAPEUTIC EXERCISES: CPT | Mod: GP | Performed by: PHYSICAL THERAPIST

## 2021-08-03 PROCEDURE — 97140 MANUAL THERAPY 1/> REGIONS: CPT | Mod: GP | Performed by: PHYSICAL THERAPIST

## 2021-08-19 PROBLEM — M79.641 PAIN OF RIGHT HAND: Status: RESOLVED | Noted: 2021-03-19 | Resolved: 2021-08-19

## 2021-08-24 ENCOUNTER — THERAPY VISIT (OUTPATIENT)
Dept: OCCUPATIONAL THERAPY | Facility: CLINIC | Age: 56
End: 2021-08-24
Payer: COMMERCIAL

## 2021-08-24 ENCOUNTER — THERAPY VISIT (OUTPATIENT)
Dept: PHYSICAL THERAPY | Facility: CLINIC | Age: 56
End: 2021-08-24
Payer: COMMERCIAL

## 2021-08-24 DIAGNOSIS — M53.3 PAIN IN THE COCCYX: ICD-10-CM

## 2021-08-24 DIAGNOSIS — M79.641 PAIN OF RIGHT HAND: Primary | ICD-10-CM

## 2021-08-24 DIAGNOSIS — M25.561 ACUTE PAIN OF RIGHT KNEE: ICD-10-CM

## 2021-08-24 PROCEDURE — 97140 MANUAL THERAPY 1/> REGIONS: CPT | Mod: GP | Performed by: PHYSICAL THERAPIST

## 2021-08-24 PROCEDURE — 97110 THERAPEUTIC EXERCISES: CPT | Mod: GO | Performed by: OCCUPATIONAL THERAPIST

## 2021-08-24 PROCEDURE — 97140 MANUAL THERAPY 1/> REGIONS: CPT | Mod: GO | Performed by: OCCUPATIONAL THERAPIST

## 2021-08-24 PROCEDURE — 97035 APP MDLTY 1+ULTRASOUND EA 15: CPT | Mod: GP | Performed by: PHYSICAL THERAPIST

## 2021-08-24 NOTE — PROGRESS NOTES
Progress Note - Hand Therapy  Reporting period is 3/19/21 to 8/24/21.    Subjectve:   Subjective changes as noted by patient:  I tweaked it about a month ago, and it just hasn't gone away. I feel it here, through my thumb a little. Feels like it's sore here (thenars).  Functional changes noted by patient:  Decreased Performance in Self Care Tasks (dressing, eating), Recreational Activities and Household Chores  Patient has noted adverse reaction to:  None    Objective:  Changes noted in objective findings: see below    Pain Level (Scale 0-10):   3/19/2021 4/1/21 4/15/21 4/29/21 8/24   At Rest 1 0 0 0/10 1/10   With Use 5 4-5 1-2 3/10 5/10     Pain Description:  Date 3/19/2021 4/29/21 8/24   Location long finger and ring finger PIP Long and ring finger Thenars, MF, RF   Pain Quality Pulling, aching Stiff, sore occasionally Stiff, sore through fingers  Shooting through thumb   Frequency constant   intermittant Constant   Pain is worst  daytime, mostly in the morning morning Morning - fingers  Thumb - throughout the day   Exacerbated by  none none Opening doors   Relieved by none Stretching, splints Splints, massage   Progression Gradually getting better Getting better Worsening     Edema (Circumference measured in cm)   3/19/2021 3/19/2021    L R   Long P1 4.9 5.2   PIP 5.3 5.8   P2 4.5 4.5   Ring P1 4.5 4.9   PIP 5.0 5.4   P2 4.0 4.0     Sensation   WNL throughout all nerve distributions; per patient report    ROM  Long Finger 3/19/2021 3/19/2021 4/1/21 4/15/21 4/29/21 8/24/21   AROM (PROM) L R R R  R   MCP 0/94 0/90 /104 0/95     PIP 0/100 -10/105 -10/110 -5/109 -9/ -14/   DIP 0/80 0/76 /86 0/83     GALLOWAY 274 261 290 282       Ring Finger 3/19/2021 3/19/2021 4/1/21 4/15/21 4/29/21 8/24/21   AROM (PROM) L R R R  R   MCP 0/90 0/89 /95 97     PIP 0/102 -14/105 -14/112 -9/117 -7/ -10/   DIP 0/71 0/61 /66 64     GALLOWAY 263 241 259 269       Palpation 8/24/2021   Volar CMC Mild pain   Thenars Mild pain     Strength   (Measured  in pounds)  Pain Report: 0-10/10   3/19/2021 3/19/2021 4/29/21   Trials L R R   1  2  3 49 44 50   Average 49 44      3 Pt Pinch 3/19/2021 3/19/2021   Trials L R   1  2  3 10 12   Average 10 12     Assessment:  Response to therapy has been lack of progress in:  ROM of Fingers: PIP joint - Ext  Pain:  frequency is more, intensity of pain has increased and more tender over affected area    Overall Assessment:  Patient has experienced an exacerbation of symptoms.  Patient would benefit from continued therapy to achieve rehab potential  STG/LTG:  STGoals have been reviewed;  see goal sheet for details and updates.  LTGoals have been reviewed;  see goal sheet for details and updates.    I have re-evaluated this patient and find that the nature, scope, duration and intensity of the therapy is appropriate for the medical condition of the patient.    Plan:  Frequency:  1 X week, once daily  Duration:  for 6 weeks    Next visit:  MFR   CMC stability  Consider HBTS

## 2021-09-04 ENCOUNTER — ANCILLARY PROCEDURE (OUTPATIENT)
Dept: GENERAL RADIOLOGY | Facility: CLINIC | Age: 56
End: 2021-09-04
Attending: FAMILY MEDICINE
Payer: COMMERCIAL

## 2021-09-04 ENCOUNTER — OFFICE VISIT (OUTPATIENT)
Dept: URGENT CARE | Facility: URGENT CARE | Age: 56
End: 2021-09-04
Payer: COMMERCIAL

## 2021-09-04 VITALS
DIASTOLIC BLOOD PRESSURE: 64 MMHG | SYSTOLIC BLOOD PRESSURE: 96 MMHG | WEIGHT: 131.2 LBS | BODY MASS INDEX: 24.59 KG/M2 | RESPIRATION RATE: 16 BRPM | TEMPERATURE: 98.9 F | HEART RATE: 92 BPM | OXYGEN SATURATION: 97 %

## 2021-09-04 DIAGNOSIS — M54.6 ACUTE BILATERAL THORACIC BACK PAIN: ICD-10-CM

## 2021-09-04 DIAGNOSIS — J18.9 WALKING PNEUMONIA: ICD-10-CM

## 2021-09-04 DIAGNOSIS — M54.6 ACUTE BILATERAL THORACIC BACK PAIN: Primary | ICD-10-CM

## 2021-09-04 PROCEDURE — 71046 X-RAY EXAM CHEST 2 VIEWS: CPT | Performed by: RADIOLOGY

## 2021-09-04 PROCEDURE — 99214 OFFICE O/P EST MOD 30 MIN: CPT | Performed by: FAMILY MEDICINE

## 2021-09-04 RX ORDER — ALBUTEROL SULFATE 90 UG/1
1-2 AEROSOL, METERED RESPIRATORY (INHALATION) EVERY 4 HOURS PRN
Qty: 18 G | Refills: 0 | Status: SHIPPED | OUTPATIENT
Start: 2021-09-04

## 2021-09-04 RX ORDER — AZITHROMYCIN 250 MG/1
TABLET, FILM COATED ORAL
Qty: 12 TABLET | Refills: 0 | Status: SHIPPED | OUTPATIENT
Start: 2021-09-04 | End: 2022-03-19

## 2021-09-04 NOTE — PROGRESS NOTES
ASSESSMENT/ PLAN:  Acute bilateral thoracic back pain     - XR Chest 2 Views; Future     We discussed the possible causes of the patient's musculoskeletal pain  ,  Including pain due to   Muscle strain,  Sprain of ligaments       X-ray was performed with findings of no vertebral fracture , no chronic degenerative changes and no other bone abnormalities. -  No lung infiltrate    We discussed that upper back pain is often associated with activities holding the arm over the head and prolonged  holding out the arm without adequate support, like prolonged keyboarding without adequate support of the arms    Continue prn heat   application.       May use Ibuprofen 600-800 mg po tid  and/ or Acetaminophen 1000 mg po qid  for back pain  Recommend use of OTC topical muscle rubs that contain salicylate compounds ( Herbert Kirby, [employer]percre[employer])-  We discussed that these compounds are absorbed locally in the skin and provide relief like an NSAID to the area of pain,  with little systemic exposure of the heart/ kidneys and other organs to the pain compounds   Follow-up with primary care if persistent symptoms    Walking pneumonia     - azithromycin (ZITHROMAX) 250 MG tablet; 2 tablet day 1 then one tablet daily for 10 days  - albuterol (PROAIR HFA/PROVENTIL HFA/VENTOLIN HFA) 108 (90 Base) MCG/ACT inhaler; Inhale 1-2 puffs into the lungs every 4 hours as needed for shortness of breath / dyspnea or wheezing    Little crackle respiratory sounds noted-  No infiltrate noted on cxr     We discussed that the patient's symptoms most closely fit the pattern of a walking pneumonia or mycoplasma pneumonitis with a prolonged, low level respiratory infection involving both lungs. Not much fevers or chills,   pulmonary secretions that are difficult to clear with coughing.      We discussed that no antibiotic kills mycoplasma, and only certain antibiotics will slow the multiplication of the organism, so more prolonged antibiotic treatment is often  necessary    Use of an albuterol inhaler and OTC expectorant can be helpful to open the airways to help clear the thick secretions from the airways.    Albuterol inhaler 1-2 puffs q 4-6 hours prn wheezing or short of breath    Symptomatic measures encouraged, humidified air, plenty of fluids.  Patient may consider OTC expectorant and/or cough suppressant to treat symptoms.  Return if worsening     -------------------------------------------------------------------------------------------------    SUBJECTIVE:  Chief Complaint   Patient presents with     Urgent Care     Back Pain     2 week sx across the upper back/bra area, radiates up an down, currently-7/10 at worst-9/10, sharp pain tx Ibuprofen slight effective       Respiratory Problems     1 month sx episodes of winded  last in 10-15 min tx rescue inhaler not sure was effective      HPI: Selene Lyn is a 56 year old female who presents for evaluation of upper back pain  Symptoms began 2 week(s) ago, have been onset gradual and frequency constant and are stable.  Pain is located in the  Bilateral  upper back  Region , with radiation to does not radiate,    Pain is at worst a 9 on a scale of 1-10.    Recent injury:none recalled by the patient  Personal hx of back pain is recurrent self limited episodes of low back pain in the past.  no history of activities with prolonged exertion with the arms    Pain is exacerbated by: no specific movement-  Not improved with massage.  no increased pain with movement of the neck  some increased pain with movement of the bilateral arm  Pain is relieved by: no treatments   ASSOCIATED  Symptoms:   Patient denies fecal incontinence, lower extremity weakness numbness or tingling, urinary or stool incontinence or lower extremity radicular symptoms.  Red flag symptoms:  Patient denies fever, chills, weight loss , weight gain, headaches, dizziness, fatigue, weakness, night sweats       She has had 1 month of feeling increased winded,  no significant cough, but with heaviness in the chest, congestion,  No fever/ chills.  Has some seasonal allergies    Past Medical History:   Diagnosis Date     Allergic rhinitis 4/20/2009     Problem list name updated by automated process. Provider to review     Diffuse cystic mastopathy      Mild intermittent asthma     alleries triggers include smells and seasonal allergies     Tachycardia, unspecified 12/99    ablated for re-entrant tachycardia     TMJ (temporomandibular joint syndrome) 1/11/2019     Patient Active Problem List   Diagnosis     Mild intermittent asthma     Diffuse cystic mastopathy     Plantar fascial fibromatosis     Allergic rhinitis     Other chronic allergic conjunctivitis     ACP (advance care planning)     Health Care Home     Pain in joint, shoulder region     Pain in the coccyx     TMJ (temporomandibular joint syndrome)     Right knee pain       ALLERGIES:  Doxycycline, Erythrocin, Penicillins, and Sulfa drugs    MEDs  albuterol (PROAIR HFA) 108 (90 Base) MCG/ACT inhaler, Inhale 2 puffs into the lungs every 4 hours as needed for shortness of breath / dyspnea or wheezing  Cholecalciferol (VITAMIN D3),   fluticasone (FLOVENT HFA) 110 MCG/ACT inhaler, Inhale 1 puff into the lungs 2 times daily During allergy season as needed  Multiple Vitamin (MULTIVITAMIN OR), Take  by mouth.  Naproxen Sodium (ALEVE PO),   olopatadine (PATANOL) 0.1 % ophthalmic solution, Place 1 drop into the right eye 2 times daily  pantoprazole (PROTONIX) 40 MG EC tablet, Take 1 tablet (40 mg) by mouth daily DO NOT CRUSH.  VITAMIN E NATURAL PO,     No current facility-administered medications on file prior to visit.      Social History     Tobacco Use     Smoking status: Never Smoker     Smokeless tobacco: Never Used     Tobacco comment: exposed as a child but never smoked   Substance Use Topics     Alcohol use: Yes     Alcohol/week: 0.0 standard drinks     Comment: one drink every 3 months/        Family History    Problem Relation Age of Onset     Diabetes Maternal Grandmother      Cancer Mother         lung cancer   69     Diabetes Sister 40        obesity     Breast Cancer No family hx of         cystic mastopathy     Cancer - colorectal No family hx of      C.A.D. No family hx of      Eye Disorder No family hx of      Depression No family hx of          ROS:  CONSTITUTIONAL:NEGATIVE for fever, chills,    INTEGUMENTARY/SKIN: NEGATIVE for worrisome rashes,  or lesions  EYES: NEGATIVE for vision changes or irritation  ENT/MOUTH: NEGATIVE for ear, mouth and throat problems  GI: NEGATIVE for nausea, abdominal pain,   or change in bowel habits    OBJECTIVE:  BP 96/64   Pulse 92   Temp 98.9  F (37.2  C)   Resp 16   Wt 59.5 kg (131 lb 3.2 oz)   LMP  (LMP Unknown)   SpO2 97%   BMI 24.59 kg/m    Back examination: Back symmetric, no curvature. ROM normal. No CVA tenderness.  no low neck paraspinous tenderness/ spasm  no supraspinatous tenderness/ spasm  bilateral rhomboid muscle tenderness/ spasm  no lumbar paraspinous muscle tenderness/ spasm  no sacroileac tenderness    Pain is little worse with resisted ROM of the bilateral shoulder  Able to walk on toes, walk on heels and tandem walk .     GENERAL APPEARANCE: alert, mild distress and cooperative  EYES: EOMI,  PERRL, conjunctiva clear  HENT: ear canals and TM's normal.  Nose and mouth without ulcers, erythema or lesions  NECK: supple, nontender, no lymphadenopathy  RESP: little crackle anterior with deep inspiration, no wheezing, no rhonchi  CV: regular rates and rhythm, normal S1 S2, no murmur noted  ABDOMEN:  soft, nontender, no HSM or masses and bowel sounds normal  NEURO: Normal strength and tone, sensory exam grossly normal,  normal speech and mentation  SKIN: no suspicious lesions or rashes        X-ray:    No cardiomegaly, no pneumothorax, no plural effusion,  no pulmonary edema,  No noted rib / clavicle fracture.     no infiltrate      x-ray read by zack Paredes  Mary AYALA

## 2021-09-04 NOTE — PATIENT INSTRUCTIONS
Patient Education     Back Care Tips     Caring for your back  These are things you can do to prevent a recurrence of acute back pain and to reduce symptoms from chronic back pain:    Stay at a healthy weight. If you are overweight, losing weight will help most types of back pain.    Exercise is an important part of recovery from most types of back pain. The muscles behind and in front of the spine support the back. This means strengthening both the back muscles and the abdominal muscles will provide better support for your spine.     Swimming and brisk walking are good overall exercises to improve your fitness level.    Practice safe lifting methods (see below).    Practice good posture when sitting, standing, and walking. Don't sit for a long time. This puts more stress on the lower back than standing or walking.    Wear quality shoes with good arch support. Foot and ankle alignment can affect back symptoms. Don't wear high heels.    Therapeutic massage can help relax the back muscles without stretching them.    During the first 24 to 72 hours after an acute injury or flare-up of chronic back pain, put an ice pack on the painful area for 20 minutes and then remove it for 20 minutes. Do thisover a period of 60 to 90 minutes, or several times a day. As a safety precaution, don't use a heating pad at bedtime. Sleeping on a heating pad can lead to skin burns or tissue damage.    You can alternate using ice and heat.  Medicines  Talk with your healthcare provider before using medicines, especially if you have other health problems or are taking other medicines.    You may use over-the-counter medicines, such as acetaminophen, ibuprofen, or naprosyn to control pain, unless your healthcare provider prescribed other pain medicine. Talk with your healthcare provider before taking any medicines if you have a chronic condition such as diabetes, liver or kidney disease, stomach ulcers, or digestive bleeding, or are taking  blood thinners.    Be careful if you are given prescription pain medicines, opioids, or medicine for muscle spasm. They can cause drowsiness, and affect your coordination, reflexes, and judgment. Don't drive or operate heavy machinery while taking these types of medicines. Take prescription pain medicine only as prescribed by your healthcare provider.  Lumbar stretch  This simple stretch will help relax muscle spasm and keep your back more limber. If exercise makes your back pain worse, don t do it.    Lie on your back with your knees bent and both feet on the ground.    Slowly raise your left knee to your chest as you flatten your lower back against the floor. Hold for 5 seconds.    Relax and repeat the exercise with your right knee.    Do 10 of these exercises for each leg.  Safe lifting method    Don t bend over at the waist to lift an object off the floor.  Instead, bend your knees and hips in a squat.     Keep your back and head upright    Hold the object close to your body, directly in front of you.    Straighten your legs to lift the object.     Lower the object to the floor in the reverse fashion.    If you must slide something across the floor, push it.    Posture tips  Sitting  Sit in chairs with straight backs or low-back support. Keep your knees lower than your hips, with your feet flat on the floor.  When driving, sit up straight. Adjust the seat forward so you are not leaning toward the steering wheel.  A small pillow or rolled towel behind your lower back may help if you are driving long distances.   Standing  When standing for long periods, shift most of your weight to one leg at a time. Switch legs every few minutes.   Sleeping  The best way to sleep is on your side with your knees bent. Put a low pillow under your head to support your neck in a neutral spine position. Don't use thick pillows that bend your neck to one side. Put a pillow between your legs to further relax your lower back. If you  sleep on your back, put pillows under your knees to support your legs in a slightly flexed position. Use a firm mattress. If your mattress sags, replace it, or use a 1/2-inch plywood board under the mattress to add support.  Follow-up care  Follow up with your healthcare provider, or as advised.  If X-rays, a CT scan or an MRI scan were taken, they may be reviewed by a radiologist. You will be told of any new findings that may affect your care.  Call 911  Call 911 if any of the following occur:    Trouble breathing    Confusion    Very drowsy    Fainting or loss of consciousness    Rapid or very slow heart rate    Loss of  bowel or bladder control  When to seek medical advice  Call your healthcare provider right away if any of the following occur:    Pain becomes worse or spreads to your arms or legs    Weakness or numbness in one or both arms or legs    Numbness in the groin area  [employer] last reviewed this educational content on 11/1/2019 2000-2021 [employer]. All rights reserved. This information is not intended as a substitute for professional medical care. Always follow your healthcare professional's instructions.

## 2021-09-07 ENCOUNTER — LAB REQUISITION (OUTPATIENT)
Dept: LAB | Facility: CLINIC | Age: 56
End: 2021-09-07
Payer: COMMERCIAL

## 2021-09-07 DIAGNOSIS — R05.9 COUGH: ICD-10-CM

## 2021-09-07 PROCEDURE — U0005 INFEC AGEN DETEC AMPLI PROBE: HCPCS | Mod: ORL | Performed by: FAMILY MEDICINE

## 2021-09-09 ENCOUNTER — THERAPY VISIT (OUTPATIENT)
Dept: OCCUPATIONAL THERAPY | Facility: CLINIC | Age: 56
End: 2021-09-09
Payer: COMMERCIAL

## 2021-09-09 DIAGNOSIS — M79.641 PAIN OF RIGHT HAND: Primary | ICD-10-CM

## 2021-09-09 LAB — SARS-COV-2 RNA RESP QL NAA+PROBE: NEGATIVE

## 2021-09-09 PROCEDURE — 97763 ORTHC/PROSTC MGMT SBSQ ENC: CPT | Mod: GO | Performed by: OCCUPATIONAL THERAPIST

## 2021-09-09 PROCEDURE — 97140 MANUAL THERAPY 1/> REGIONS: CPT | Mod: GO | Performed by: OCCUPATIONAL THERAPIST

## 2021-09-09 PROCEDURE — 97110 THERAPEUTIC EXERCISES: CPT | Mod: GO | Performed by: OCCUPATIONAL THERAPIST

## 2021-09-16 ENCOUNTER — THERAPY VISIT (OUTPATIENT)
Dept: OCCUPATIONAL THERAPY | Facility: CLINIC | Age: 56
End: 2021-09-16
Payer: COMMERCIAL

## 2021-09-16 DIAGNOSIS — M79.641 PAIN OF RIGHT HAND: Primary | ICD-10-CM

## 2021-09-16 PROCEDURE — 97140 MANUAL THERAPY 1/> REGIONS: CPT | Mod: GO | Performed by: OCCUPATIONAL THERAPIST

## 2021-09-16 PROCEDURE — 97110 THERAPEUTIC EXERCISES: CPT | Mod: GO | Performed by: OCCUPATIONAL THERAPIST

## 2021-09-20 ENCOUNTER — THERAPY VISIT (OUTPATIENT)
Dept: PHYSICAL THERAPY | Facility: CLINIC | Age: 56
End: 2021-09-20
Payer: COMMERCIAL

## 2021-09-20 DIAGNOSIS — M25.561 ACUTE PAIN OF RIGHT KNEE: ICD-10-CM

## 2021-09-20 DIAGNOSIS — M53.3 PAIN IN THE COCCYX: ICD-10-CM

## 2021-09-20 PROCEDURE — 97140 MANUAL THERAPY 1/> REGIONS: CPT | Mod: GP | Performed by: PHYSICAL THERAPIST

## 2021-09-20 NOTE — PROGRESS NOTES
Subjective:  HPI  Physical Exam                    Objective:  System    Physical Exam    General     ROS    Assessment/Plan:    PROGRESS  REPORT    Progress reporting period is from 09/20/21.       SUBJECTIVE  Subjective changes noted by patient:   Subjective:  A little better overall in coccyx. Some sit bone some soreness, not sure why. LBP doing well. Going for walks pretty consistently and HEP. Coccyx pain 3/10. Some sense of urinary urgency but somewhat better. Generally delay voiding for 1.5 hours.    Current pain level is 3/10 .     Previous pain level was   Initial Pain level: 7/10.   Changes in function:  Yes (See Goal flowsheet attached for changes in current functional level)  Adverse reaction to treatment or activity: None    OBJECTIVE  Changes noted in objective findings:  Yes,   Objective: TTP along PSIS, piriformis B and with Ojrge's massage. Coccyx in slight flexion, minimal movement noted.      ASSESSMENT/PLAN  Updated problem list and treatment plan: Diagnosis 1:  Coccyx pain  Pain -  hot/cold therapy, US, manual therapy, splint/taping/bracing/orthotics, self management, education, directional preference exercise and home program  Decreased ROM/flexibility - manual therapy and therapeutic exercise  Decreased joint mobility - manual therapy and therapeutic exercise  Decreased strength - therapeutic exercise and therapeutic activities  Decreased proprioception - neuro re-education and therapeutic activities  Inflammation - self management/home program  Impaired muscle performance - neuro re-education  Decreased function - therapeutic activities  Impaired posture - neuro re-education  STG/LTGs have been met or progress has been made towards goals:  Yes (See Goal flow sheet completed today.)  Assessment of Progress: The patient's condition is improving.  Self Management Plans:  Patient has been instructed in a home treatment program.  Patient  has been instructed in self management of  symptoms.    June continues to require the following intervention to meet STG and LTG's:  PT    Recommendations:  This patient would benefit from continued therapy.     Frequency:  1 X a month, once daily  Duration:  for 2 months        Please refer to the daily flowsheet for treatment today, total treatment time and time spent performing 1:1 timed codes.

## 2021-09-20 NOTE — LETTER
THOMAS [facility]  [address]  [address]  [facility] [address]  [phone]    2021    Re: Selene Lyn   :   1965  MRN:  [id number]   REFERRING PHYSICIAN:   Amparo GUZMAN [facility]  Date of Initial Evaluation:  2020  Visits:  Rxs Used: 25  Reason for Referral:     Acute pain of right knee  Pain in the coccyx    PROGRESS  REPORT  Progress reporting period is from 21.       SUBJECTIVE  Subjective changes noted by patient:   Subjective:  A little better overall in coccyx. Some sit bone some soreness, not sure why. LBP doing well. Going for walks pretty consistently and HEP. Coccyx pain 3/10. Some sense of urinary urgency but somewhat better. Generally delay voiding for 1.5 hours.    Current pain level is 3/10 .     Previous pain level was   Initial Pain level: 7/10.   Changes in function:  Yes (See Goal flowsheet attached for changes in current functional level)  Adverse reaction to treatment or activity: None    OBJECTIVE  Changes noted in objective findings:  Yes,   Objective: TTP along PSIS, piriformis B and with Jorge's massage. Coccyx in slight flexion, minimal movement noted.      ASSESSMENT/PLAN  Updated problem list and treatment plan: Diagnosis 1:  Coccyx pain  Pain -  hot/cold therapy, US, manual therapy, splint/taping/bracing/orthotics, self management, education, directional preference exercise and home program  Decreased ROM/flexibility - manual therapy and therapeutic exercise  Decreased joint mobility - manual therapy and therapeutic exercise  Decreased strength - therapeutic exercise and therapeutic activities  Decreased proprioception - neuro re-education and therapeutic activities  Inflammation - self management/home program  Impaired muscle performance - neuro re-education  Decreased function - therapeutic activities  Impaired posture - neuro  re-education  STG/LTGs have been met or progress has been made towards goals:  Yes (See Goal flow sheet completed today.)  Assessment of Progress: The patient's condition is improving.  Self Management Plans:  Patient has been instructed in a home treatment program.  Re: Selene GUZMAN Riki   :   1965        Patient  has been instructed in self management of symptoms.    Selene continues to require the following intervention to meet STG and LTG's:  PT    Recommendations:  This patient would benefit from continued therapy.     Frequency:  1 X a month, once daily  Duration:  for 2 months    Thank you for your referral.    INQUIRIES  Therapist: Nicki Kapadia, MS, PT, [facility] SERVICES [place] [id number]  Phone: [phone]  Fax: [phone]

## 2021-09-23 ENCOUNTER — THERAPY VISIT (OUTPATIENT)
Dept: OCCUPATIONAL THERAPY | Facility: CLINIC | Age: 56
End: 2021-09-23
Payer: COMMERCIAL

## 2021-09-23 DIAGNOSIS — M79.641 HAND PAIN, RIGHT: Primary | ICD-10-CM

## 2021-09-23 PROCEDURE — 97110 THERAPEUTIC EXERCISES: CPT | Mod: GO | Performed by: OCCUPATIONAL THERAPIST

## 2021-09-23 PROCEDURE — 97140 MANUAL THERAPY 1/> REGIONS: CPT | Mod: GO | Performed by: OCCUPATIONAL THERAPIST

## 2021-09-25 ENCOUNTER — HEALTH MAINTENANCE LETTER (OUTPATIENT)
Age: 56
End: 2021-09-25

## 2021-09-30 ENCOUNTER — THERAPY VISIT (OUTPATIENT)
Dept: OCCUPATIONAL THERAPY | Facility: CLINIC | Age: 56
End: 2021-09-30
Payer: COMMERCIAL

## 2021-09-30 DIAGNOSIS — M79.641 HAND PAIN, RIGHT: ICD-10-CM

## 2021-09-30 PROCEDURE — 97110 THERAPEUTIC EXERCISES: CPT | Mod: GO | Performed by: OCCUPATIONAL THERAPIST

## 2021-09-30 PROCEDURE — 97140 MANUAL THERAPY 1/> REGIONS: CPT | Mod: GO | Performed by: OCCUPATIONAL THERAPIST

## 2021-09-30 NOTE — LETTER
THOMAS [facility]  [address]  [address]  [facility] [address]  [phone]    2021    Re: Selene Lyn   :   1965  MRN:  [id number]   REFERRING PHYSICIAN:   Amparo GUZMAN [facility]    Date of Initial Evaluation:  2021  Visits:  Rxs Used: 10  Reason for Referral:  Hand pain, right    EVALUATION SUMMARY    Progress Note - Hand Therapy  Reporting period is 3/19/21 to 21.    Subjectve:   Subjective changes as noted by patient: Feeling swollen in the fingers. They get better as the day goes on. Trying to do the heat.  Functional changes noted by patient:  Decreased Performance in Self Care Tasks (dressing, eating), Recreational Activities and Household Chores  Patient has noted adverse reaction to:  None    Objective:  Changes noted in objective findings: see below    Pain Level (Scale 0-10):   3/19/2021 4/1/21 4/15/21 4/29/21 8/24 9/30/21   At Rest 1 0 0 0/10 10 0-1/10   With Use 5 4-5 1-2 3/10 510 3-4/10     Pain Description:  Date 3/19/2021 4/29/21 8/24 9/30/21   Location long finger and ring finger PIP Long and ring finger Thenars, MF, RF MF, RF, occasionally thenars   Pain Quality Pulling, aching Stiff, sore occasionally Stiff, sore through fingers  Shooting through thumb Stiff through fingers, occasionally shooting through thumb   Frequency constant   intermittant Constant Almost constant   Pain is worst  daytime, mostly in the morning morning Morning - fingers  Thumb - throughout the day Morning - fingers  Thumb - throughout the day   Exacerbated by  none none Opening doors Opening jars, and doors   Relieved by none Stretching, splints Splints, massage Massage, splints   Progression Gradually getting better Getting better Worsening Fingers - plateaued  Thumb - gradually better           Re: Selene Lyn   :   1965        Edema (Circumference measured in cm)    3/19/2021 3/19/2021    L R   Long P1 4.9 5.2   PIP 5.3 5.8   P2 4.5 4.5   Ring P1 4.5 4.9   PIP 5.0 5.4   P2 4.0 4.0     Sensation   WNL throughout all nerve distributions; per patient report    ROM  Long Finger 3/19/2021 3/19/2021 4/1/21 4/15/21 4/29/21 8/24/21 9/30/21   AROM (PROM) L R R R R R R   MCP 0/94 0/90 /104 0/95      PIP 0/100 -10/105 -10/110 -5/109 -9/ 14/ 9/   DIP 0/80 0/76 /86 0/83      GALLOWAY 274 261 290 282        Ring Finger 3/19/2021 3/19/2021 4/1/21 4/15/21 4/29/21 8/24/21 9/30/21   AROM (PROM) L R R R R R R   MCP 0/90 0/89 /95 97      PIP 0/102 -14/105 -14/112 -9/117 7/ 10/ 15/   DIP 0/71 0/61 /66 64      GALLOWAY 263 241 259 269        Palpation 2021   Volar CMC Mild pain Mild pain   Thenars Mild pain Mild pain     Strength   (Measured in pounds)  Pain Report: 0-10/10   3/19/2021 3/19/2021 4/29/21 9/30/21   Trials L R R R   1  2  3 49 44 50 46   Average 49 44       3 Pt Pinch 3/19/2021 3/19/2021   Trials L R   1  2  3 10 12   Average 10 12             Re: Selene THOMAS Riki   :   1965    Assessment:  Response to therapy has been improvement to:  ROM of Fingers: PIP joint - Ext  Pain:  frequency is less and intensity of pain is decreased    Overall Assessment:  Patient's symptoms are resolving.  Patient is progressing well and is ready to decrease frequency of treatment in the clinic.  Patient would benefit from continued therapy to achieve rehab potential  STG/LTG:  STGoals have been reviewed;  see goal sheet for details and updates.  LTGoals have been reviewed;  see goal sheet for details and updates.    I have re-evaluated this patient and find that the nature, scope, duration and intensity of the therapy is appropriate for the medical condition of the patient.    Plan:  Frequency:  1 X week, every other week, once daily  Duration:  for 8 weeks (4 visits)    Next visit:  MFR   CMC stability  AE education    Thank you for your referral.    INQUIRIES  Therapist: ELA Fink  [facility] SERVICES [address] [address]  Phone: [phone]  Fax: [phone]

## 2021-09-30 NOTE — PROGRESS NOTES
Progress Note - Hand Therapy  Reporting period is 3/19/21 to 9/30/21.    Subjectve:   Subjective changes as noted by patient: Feeling swollen in the fingers. They get better as the day goes on. Trying to do the heat.  Functional changes noted by patient:  Decreased Performance in Self Care Tasks (dressing, eating), Recreational Activities and Household Chores  Patient has noted adverse reaction to:  None    Objective:  Changes noted in objective findings: see below    Pain Level (Scale 0-10):   3/19/2021 4/1/21 4/15/21 4/29/21 8/24 9/30/21   At Rest 1 0 0 0/10 1/10 0-1/10   With Use 5 4-5 1-2 3/10 5/10 3-4/10     Pain Description:  Date 3/19/2021 4/29/21 8/24 9/30/21   Location long finger and ring finger PIP Long and ring finger Thenars, MF, RF MF, RF, occasionally thenars   Pain Quality Pulling, aching Stiff, sore occasionally Stiff, sore through fingers  Shooting through thumb Stiff through fingers, occasionally shooting through thumb   Frequency constant   intermittant Constant Almost constant   Pain is worst  daytime, mostly in the morning morning Morning - fingers  Thumb - throughout the day Morning - fingers  Thumb - throughout the day   Exacerbated by  none none Opening doors Opening jars, and doors   Relieved by none Stretching, splints Splints, massage Massage, splints   Progression Gradually getting better Getting better Worsening Fingers - plateaued  Thumb - gradually better     Edema (Circumference measured in cm)   3/19/2021 3/19/2021    L R   Long P1 4.9 5.2   PIP 5.3 5.8   P2 4.5 4.5   Ring P1 4.5 4.9   PIP 5.0 5.4   P2 4.0 4.0     Sensation   WNL throughout all nerve distributions; per patient report    ROM  Long Finger 3/19/2021 3/19/2021 4/1/21 4/15/21 4/29/21 8/24/21 9/30/21   AROM (PROM) L R R R R R R   MCP 0/94 0/90 /104 0/95      PIP 0/100 -10/105 -10/110 -5/109 -9/ 14/ 9/   DIP 0/80 0/76 /86 0/83      GALLOWAY 274 261 290 282        Ring Finger 3/19/2021 3/19/2021 4/1/21 4/15/21 4/29/21 8/24/21  9/30/21   AROM (PROM) L R R R R R R   MCP 0/90 0/89 /95 97      PIP 0/102 -14/105 -14/112 -9/117 7/ 10/ 15/   DIP 0/71 0/61 /66 64      GALLOWAY 263 241 259 269        Palpation 8/24/2021 9/30/21   Volar CMC Mild pain Mild pain   Thenars Mild pain Mild pain     Strength   (Measured in pounds)  Pain Report: 0-10/10   3/19/2021 3/19/2021 4/29/21 9/30/21   Trials L R R R   1  2  3 49 44 50 46   Average 49 44       3 Pt Pinch 3/19/2021 3/19/2021   Trials L R   1  2  3 10 12   Average 10 12     Assessment:  Response to therapy has been improvement to:  ROM of Fingers: PIP joint - Ext  Pain:  frequency is less and intensity of pain is decreased    Overall Assessment:  Patient's symptoms are resolving.  Patient is progressing well and is ready to decrease frequency of treatment in the clinic.  Patient would benefit from continued therapy to achieve rehab potential  STG/LTG:  STGoals have been reviewed;  see goal sheet for details and updates.  LTGoals have been reviewed;  see goal sheet for details and updates.    I have re-evaluated this patient and find that the nature, scope, duration and intensity of the therapy is appropriate for the medical condition of the patient.    Plan:  Frequency:  1 X week, every other week, once daily  Duration:  for 8 weeks (4 visits)    Next visit:  MFR   CMC stability  AE education

## 2021-10-05 ENCOUNTER — THERAPY VISIT (OUTPATIENT)
Dept: PHYSICAL THERAPY | Facility: CLINIC | Age: 56
End: 2021-10-05
Payer: COMMERCIAL

## 2021-10-05 DIAGNOSIS — M53.3 PAIN IN THE COCCYX: ICD-10-CM

## 2021-10-05 DIAGNOSIS — M25.561 ACUTE PAIN OF RIGHT KNEE: ICD-10-CM

## 2021-10-05 PROCEDURE — 97140 MANUAL THERAPY 1/> REGIONS: CPT | Mod: GP | Performed by: PHYSICAL THERAPIST

## 2021-10-05 PROCEDURE — 97110 THERAPEUTIC EXERCISES: CPT | Mod: GP | Performed by: PHYSICAL THERAPIST

## 2021-10-05 PROCEDURE — 97535 SELF CARE MNGMENT TRAINING: CPT | Mod: GP | Performed by: PHYSICAL THERAPIST

## 2021-10-14 ENCOUNTER — THERAPY VISIT (OUTPATIENT)
Dept: OCCUPATIONAL THERAPY | Facility: CLINIC | Age: 56
End: 2021-10-14
Payer: COMMERCIAL

## 2021-10-14 DIAGNOSIS — M79.641 HAND PAIN, RIGHT: ICD-10-CM

## 2021-10-14 PROCEDURE — 97110 THERAPEUTIC EXERCISES: CPT | Mod: GO | Performed by: OCCUPATIONAL THERAPIST

## 2021-10-14 PROCEDURE — 97140 MANUAL THERAPY 1/> REGIONS: CPT | Mod: GO | Performed by: OCCUPATIONAL THERAPIST

## 2021-10-22 ENCOUNTER — THERAPY VISIT (OUTPATIENT)
Dept: PHYSICAL THERAPY | Facility: CLINIC | Age: 56
End: 2021-10-22
Payer: COMMERCIAL

## 2021-10-22 DIAGNOSIS — M25.561 ACUTE PAIN OF RIGHT KNEE: Primary | ICD-10-CM

## 2021-10-22 PROCEDURE — 97161 PT EVAL LOW COMPLEX 20 MIN: CPT | Mod: GP | Performed by: PHYSICAL THERAPIST

## 2021-10-22 PROCEDURE — 97110 THERAPEUTIC EXERCISES: CPT | Mod: GP | Performed by: PHYSICAL THERAPIST

## 2021-10-22 ASSESSMENT — ACTIVITIES OF DAILY LIVING (ADL)
WALK: ACTIVITY IS SOMEWHAT DIFFICULT
GO UP STAIRS: ACTIVITY IS MINIMALLY DIFFICULT
SQUAT: ACTIVITY IS MINIMALLY DIFFICULT
GIVING WAY, BUCKLING OR SHIFTING OF KNEE: I HAVE THE SYMPTOM BUT IT DOES NOT AFFECT MY ACTIVITY
LIMPING: I HAVE THE SYMPTOM BUT IT DOES NOT AFFECT MY ACTIVITY
KNEE_ACTIVITY_OF_DAILY_LIVING_SCORE: 70
AS_A_RESULT_OF_YOUR_KNEE_INJURY,_HOW_WOULD_YOU_RATE_YOUR_CURRENT_LEVEL_OF_DAILY_ACTIVITY?: ABNORMAL
SIT WITH YOUR KNEE BENT: ACTIVITY IS MINIMALLY DIFFICULT
PAIN: THE SYMPTOM AFFECTS MY ACTIVITY MODERATELY
HOW_WOULD_YOU_RATE_THE_OVERALL_FUNCTION_OF_YOUR_KNEE_DURING_YOUR_USUAL_DAILY_ACTIVITIES?: ABNORMAL
RISE FROM A CHAIR: ACTIVITY IS NOT DIFFICULT
KNEEL ON THE FRONT OF YOUR KNEE: ACTIVITY IS SOMEWHAT DIFFICULT
GO DOWN STAIRS: ACTIVITY IS SOMEWHAT DIFFICULT
STIFFNESS: THE SYMPTOM AFFECTS MY ACTIVITY SLIGHTLY
WEAKNESS: THE SYMPTOM AFFECTS MY ACTIVITY SLIGHTLY
SWELLING: THE SYMPTOM AFFECTS MY ACTIVITY MODERATELY
RAW_SCORE: 49
KNEE_ACTIVITY_OF_DAILY_LIVING_SUM: 49
HOW_WOULD_YOU_RATE_THE_CURRENT_FUNCTION_OF_YOUR_KNEE_DURING_YOUR_USUAL_DAILY_ACTIVITIES_ON_A_SCALE_FROM_0_TO_100_WITH_100_BEING_YOUR_LEVEL_OF_KNEE_FUNCTION_PRIOR_TO_YOUR_INJURY_AND_0_BEING_THE_INABILITY_TO_PERFORM_ANY_OF_YOUR_USUAL_DAILY_ACTIVITIES?: 50
STAND: ACTIVITY IS NOT DIFFICULT

## 2021-10-22 NOTE — LETTER
[facility]  [address]  [address]  [facility] [address]  [phone]    2021    Re: Selene Lyn   :   1965  MRN:  [id number]   REFERRING PHYSICIAN:   Joyce Ansari    [facility]    Date of Initial Evaluation:  10/22/2021  Visits:     Reason for Referral:  Acute pain of right knee    EVALUATION SUMMARY    Physical Therapy Initial Evaluation  Subjective:  Patient is referred with c/o R knee pain and swelling. Recent episode began in 2021 when she woke one day with stiffness and swelling in R knee. She had a similar issue about one year ago which responded well to PT. Notes pain with descending stairs, squatting and kneeling. No recent dx tests performed.    The history is provided by the patient.   Therapist Generated HPI Evaluation         Type of problem:  Right knee.    This is a recurrent condition.  Condition occurred with:  Insidious onset.  Where condition occurred: at home.  Patient reports pain:  In the joint, medial, posterior and lateral.  Pain is described as aching and is intermittent.  Pain radiates to:  Lower leg. Pain is the same all the time.  Since onset symptoms are gradually improving.  Associated symptoms:  Buckling/giving out, loss of motion/stiffness, edema and loss of strength. Symptoms are exacerbated by descending stairs, bending/squatting and kneeling  and relieved by rest.    Previous treatment includes physical therapy. There was significant improvement following previous treatment.  Restrictions due to condition include:  Working in normal job without restrictions.  Barriers include:  None as reported by patient.    Patient Health History  Selene Lyn being seen for R knee pain.     Pain is reported as 6/10 on pain scale.    Pertinent medical history includes: asthma, menopausal, numbness/tingling and sleep disorder/apnea.   Red flags:  None as reported  by patient.     Re: Selene Lyn   :   1965  MRN:  [id number]       Current medications:  None.    Current occupation is .   Primary job tasks include:  Computer work and prolonged sitting.     Objective:  System       Knee Evaluation:  ROM:      PROM    Hyperextension: Left: 3    Right:  3    Flexion: Left: 152    Right:  145    Strength:     Extension:  Left: 5/5   Pain:      Right: 5/5   Pain:  Flexion:  Left: 5/5   Pain:      Right: 5/5   Pain:    Quad Set Left: WNL    Pain:   Quad Set Right: WNL    Pain:  Ligament Testing:  Normal    Special Tests:   Right knee positive for the following tests:  Meniscal  Palpation:      Right knee tenderness present at:  Medial Joint Line  Edema:    Circumference:  Joint Line:  Left:  35.3   Right:  37.5    Mobility Testing:  Normal    Functional Testing:    Quad:    Single Leg Squat:  Left:      Right:        Bilateral Leg Squat:   Normal control      General     ROS    Assessment/Plan:    Patient is a 56 year old female with right side knee complaints.    Patient has the following significant findings with corresponding treatment plan.                Diagnosis 1:  R knee pain  Pain -  hot/cold therapy, self management, education and home program  Decreased ROM/flexibility - therapeutic exercise and home program  Edema - cold therapy  Impaired muscle performance - neuro re-education and home program  Decreased function - therapeutic activities and home program  Re: Selene Lyn   :   1965  MRN:  [id number]     Therapy Evaluation Codes:   1) History comprised of:   Personal factors that impact the plan of care:      Time since onset of symptoms.    Comorbidity factors that impact the plan of care are:      None.     Medications impacting care: None.  2) Examination of Body Systems comprised of:   Body structures and functions that impact the plan of care:      Knee.   Activity limitations that impact the plan of care are:      Squatting/kneeling,  Stairs and Walking.  3) Clinical presentation characteristics are:   Evolving/Changing.  4) Decision-Making    Moderate complexity using standardized patient assessment instrument and/or measureable assessment of functional outcome.  Cumulative Therapy Evaluation is: Moderate complexity.    Previous and current functional limitations:  (See Goal Flow Sheet for this information)    Short term and Long term goals: (See Goal Flow Sheet for this information)     Communication ability:  Patient appears to be able to clearly communicate and understand verbal and written communication and follow directions correctly.  Treatment Explanation - The following has been discussed with the patient:   RX ordered/plan of care  Anticipated outcomes  Possible risks and side effects  This patient would benefit from PT intervention to resume normal activities.   Rehab potential is excellent.    Frequency:  1 X week, once daily  Duration:  for 4 weeks  Discharge Plan:  Achieve all LTG.  Independent in home treatment program.  Reach maximal therapeutic benefit.    Please refer to the daily flowsheet for treatment today, total treatment time and time spent performing 1:1 timed codes.     Thank you for your referral.    INQUIRIES  Therapist: Armand Garcias PT  [facility] SERVICES [address] [address]  Phone: [phone]  Fax: [phone]

## 2021-10-25 ENCOUNTER — THERAPY VISIT (OUTPATIENT)
Dept: PHYSICAL THERAPY | Facility: CLINIC | Age: 56
End: 2021-10-25
Payer: COMMERCIAL

## 2021-10-25 DIAGNOSIS — M25.561 ACUTE PAIN OF RIGHT KNEE: Primary | ICD-10-CM

## 2021-10-25 PROCEDURE — 97110 THERAPEUTIC EXERCISES: CPT | Mod: GP | Performed by: PHYSICAL THERAPY ASSISTANT

## 2021-10-25 PROCEDURE — 97112 NEUROMUSCULAR REEDUCATION: CPT | Mod: GP | Performed by: PHYSICAL THERAPY ASSISTANT

## 2021-10-26 ENCOUNTER — THERAPY VISIT (OUTPATIENT)
Dept: OCCUPATIONAL THERAPY | Facility: CLINIC | Age: 56
End: 2021-10-26
Payer: COMMERCIAL

## 2021-10-26 DIAGNOSIS — M79.641 HAND PAIN, RIGHT: ICD-10-CM

## 2021-10-26 PROCEDURE — 97110 THERAPEUTIC EXERCISES: CPT | Mod: GO | Performed by: OCCUPATIONAL THERAPIST

## 2021-10-26 PROCEDURE — 97140 MANUAL THERAPY 1/> REGIONS: CPT | Mod: GO | Performed by: OCCUPATIONAL THERAPIST

## 2021-10-27 ENCOUNTER — THERAPY VISIT (OUTPATIENT)
Dept: PHYSICAL THERAPY | Facility: CLINIC | Age: 56
End: 2021-10-27
Payer: COMMERCIAL

## 2021-10-27 DIAGNOSIS — M53.3 PAIN IN THE COCCYX: ICD-10-CM

## 2021-10-27 DIAGNOSIS — M25.561 ACUTE PAIN OF RIGHT KNEE: ICD-10-CM

## 2021-10-27 PROCEDURE — 97140 MANUAL THERAPY 1/> REGIONS: CPT | Mod: GP | Performed by: PHYSICAL THERAPIST

## 2021-10-27 PROCEDURE — 97110 THERAPEUTIC EXERCISES: CPT | Mod: GP | Performed by: PHYSICAL THERAPIST

## 2021-10-27 PROCEDURE — 97535 SELF CARE MNGMENT TRAINING: CPT | Mod: GP | Performed by: PHYSICAL THERAPIST

## 2021-11-04 ENCOUNTER — THERAPY VISIT (OUTPATIENT)
Dept: PHYSICAL THERAPY | Facility: CLINIC | Age: 56
End: 2021-11-04
Payer: COMMERCIAL

## 2021-11-04 DIAGNOSIS — M25.561 ACUTE PAIN OF RIGHT KNEE: Primary | ICD-10-CM

## 2021-11-04 PROCEDURE — 97112 NEUROMUSCULAR REEDUCATION: CPT | Mod: GP | Performed by: PHYSICAL THERAPY ASSISTANT

## 2021-11-04 PROCEDURE — 97110 THERAPEUTIC EXERCISES: CPT | Mod: GP | Performed by: PHYSICAL THERAPY ASSISTANT

## 2021-11-09 ENCOUNTER — THERAPY VISIT (OUTPATIENT)
Dept: OCCUPATIONAL THERAPY | Facility: CLINIC | Age: 56
End: 2021-11-09
Payer: COMMERCIAL

## 2021-11-09 DIAGNOSIS — M79.641 HAND PAIN, RIGHT: ICD-10-CM

## 2021-11-09 PROCEDURE — 97110 THERAPEUTIC EXERCISES: CPT | Mod: GO | Performed by: OCCUPATIONAL THERAPIST

## 2021-11-09 PROCEDURE — 97140 MANUAL THERAPY 1/> REGIONS: CPT | Mod: GO | Performed by: OCCUPATIONAL THERAPIST

## 2021-11-11 ENCOUNTER — THERAPY VISIT (OUTPATIENT)
Dept: PHYSICAL THERAPY | Facility: CLINIC | Age: 56
End: 2021-11-11
Payer: COMMERCIAL

## 2021-11-11 DIAGNOSIS — M25.561 ACUTE PAIN OF RIGHT KNEE: Primary | ICD-10-CM

## 2021-11-11 PROCEDURE — 97110 THERAPEUTIC EXERCISES: CPT | Mod: GP | Performed by: PHYSICAL THERAPY ASSISTANT

## 2021-11-11 NOTE — PROGRESS NOTES
Subjective:  HPI  Physical Exam                    Objective:  System    Physical Exam    General     ROS    Assessment/Plan:    ASSESSMENT/PLAN  Updated problem list and treatment plan:   STG/LTGs have been met:  Yes (See Goal flow sheet completed today.)  Progress toward STG/LTGs have been made:  minimal  Assessment of Progress: The patient's progress has plateaued.  Self Management Plans:  Patient is independent in a home treatment program.  Patient is independent in self management of symptoms.    Selene continues to require the following intervention to meet STG and LT's:  PT intervention is no longer required to meet STG/LTG.    Recommendations:  This patient is ready to be discharged from therapy and continue their home treatment program.    Please refer to the daily flowsheet for treatment today, total treatment time and time spent performing 1:1 timed codes.

## 2021-11-11 NOTE — PROGRESS NOTES
Subjective:  HPI  Physical Exam                    Objective:  System    Physical Exam    General     ROS    Assessment/Plan:    DISCHARGE REPORT    Progress reporting period is from 11/11/20 to 11/11/21.      SUBJECTIVE  Subjective changes noted by patient:   Patient reports that she has stiffness in the am after the exercises.    Current pain level is  3/10 (sometimes it gets to a 6/10)    Previous pain level was:   Initial Pain level: 4/10   Changes in function:  Yes (See Goal flowsheet attached for changes in current functional level)     Adverse reaction to treatment or activity: None     OBJECTIVE  Changes noted in objective findings:  Yes, Right knee ROM 2-0-158.  right quad and hamstring MMT 5-/5.  Patient has a good HEP and does know them well.  She has some problems with squatting of with some pain at times.  Focus on the VMO control also.

## 2021-11-11 NOTE — LETTER
[facility]  [address]  [address]  [facility] [place] [facility] [address]  [phone]    2021    Re: Selene Lyn   :   1965  MRN:  [id number]   REFERRING PHYSICIAN:   Joyce GUZMAN [facility]    Date of Initial Evaluation:  10/22/21  Visits:     Reason for Referral:  Acute pain of right knee    EVALUATION SUMMARY    Assessment/Plan:    DISCHARGE REPORT    Progress reporting period is from 20 to 21.      SUBJECTIVE  Subjective changes noted by patient:   Patient reports that she has stiffness in the am after the exercises.    Current pain level is  3/10 (sometimes it gets to a 6/10)    Previous pain level was:   Initial Pain level: 4/10   Changes in function:  Yes (See Goal flowsheet attached for changes in current functional level)     Adverse reaction to treatment or activity: None     OBJECTIVE  Changes noted in objective findings:  Yes, Right knee ROM 2-0-158.  right quad and hamstring MMT 5-/5.  Patient has a good HEP and does know them well.  She has some problems with squatting of with some pain at times.  Focus on the VMO control also.      Assessment/Plan:    ASSESSMENT/PLAN  Updated problem list and treatment plan:   STG/LTGs have been met:  Yes (See Goal flow sheet completed today.)  Progress toward STG/LTGs have been made:  minimal  Assessment of Progress: The patient's progress has plateaued.  Self Management Plans:  Patient is independent in a home treatment program.  Patient is independent in self management of symptoms.    Selene continues to require the following intervention to meet STG and LT's:  PT intervention is no longer required to meet STG/LTG.                Re: Selene Lyn   :   1965  MRN:  [id number]     Recommendations:  This patient is ready to be discharged from therapy and continue their home treatment program.    Please  refer to the daily flowsheet for treatment today, total treatment time and time spent performing 1:1 timed codes.    Thank you for your referral.    INQUIRIES  Therapist: Armand Garcias PT  [facility] SERVICES [facility]  [address] [address] [address]  Phone: [phone]  Fax: [phone]

## 2021-11-17 ENCOUNTER — THERAPY VISIT (OUTPATIENT)
Dept: PHYSICAL THERAPY | Facility: CLINIC | Age: 56
End: 2021-11-17
Payer: COMMERCIAL

## 2021-11-17 DIAGNOSIS — M25.561 ACUTE PAIN OF RIGHT KNEE: ICD-10-CM

## 2021-11-17 DIAGNOSIS — M53.3 PAIN IN THE COCCYX: ICD-10-CM

## 2021-11-17 PROCEDURE — 97140 MANUAL THERAPY 1/> REGIONS: CPT | Mod: GP | Performed by: PHYSICAL THERAPIST

## 2021-11-17 PROCEDURE — 97110 THERAPEUTIC EXERCISES: CPT | Mod: GP | Performed by: PHYSICAL THERAPIST

## 2021-11-23 ENCOUNTER — LAB REQUISITION (OUTPATIENT)
Dept: LAB | Facility: CLINIC | Age: 56
End: 2021-11-23

## 2021-11-23 DIAGNOSIS — Z03.818 ENCOUNTER FOR OBSERVATION FOR SUSPECTED EXPOSURE TO OTHER BIOLOGICAL AGENTS RULED OUT: ICD-10-CM

## 2021-11-23 PROCEDURE — 87081 CULTURE SCREEN ONLY: CPT | Performed by: FAMILY MEDICINE

## 2021-11-26 LAB — BACTERIA SPEC CULT: NORMAL

## 2021-12-02 ENCOUNTER — THERAPY VISIT (OUTPATIENT)
Dept: OCCUPATIONAL THERAPY | Facility: CLINIC | Age: 56
End: 2021-12-02
Payer: COMMERCIAL

## 2021-12-02 DIAGNOSIS — M79.641 HAND PAIN, RIGHT: ICD-10-CM

## 2021-12-02 PROCEDURE — 97110 THERAPEUTIC EXERCISES: CPT | Mod: GO

## 2021-12-02 NOTE — LETTER
THOM[facility]ER[facility]  [address]  [address]  [facility] [address]  [phone]    2021    Re: Selene Lyn   :   1965  MRN:  [id number]   REFERRING PHYSICIAN:   Amparo GUZMAN [facility]    Date of Initial Evaluation: 21  Visits:  Rxs Used: 14  Reason for Referral:  Hand pain, right    EVALUATION SUMMARY    Progress Note - Hand Therapy  Current Date: 21  Reporting period is 21 to 21.    Subjective:   Subjective changes noted by patient:  It was really stiff this morning because I did not wear my splints last night.   Functional changes noted by patient:  Improvement in Household Chores - opening doors  Patient has noted adverse reaction to:  None    Objective:  Changes noted in objective findings: see below    Pain Level (Scale 0-10):   3/19/2021 4/1/21 4/15/21 4/29/21 8/24 9/30/21 12/2/21   At Rest 1 0 0 0/10 110 0-1/10 10   With Use 5 4-5 1-2 3/10 510 3-410 2/10                             Re: Selene Lyn   :   1965  Pain Description:  Date 3/19/2021 4/29/21 8/24 9/30/21 12/2/21   Location long finger and ring finger PIP Long and ring finger Thenars, MF, RF MF, RF, occasionally thenars MF, RF, occasionally thenars   Pain Quality Pulling, aching Stiff, sore occasionally Stiff, sore through fingers  Shooting through thumb Stiff through fingers, occasionally shooting through thumb Stiff through fingers   Frequency constant   intermittant Constant Almost constant intermittent   Pain is worst  daytime, mostly in the morning morning Morning - fingers  Thumb - throughout the day Morning - fingers  Thumb - throughout the day Morning - fingers   Exacerbated by  none none Opening doors Opening jars, and doors Just stiff with use   Relieved by none Stretching, splints Splints, massage Massage, splints Massage, splints   Progression Gradually  getting better Getting better Worsening Fingers - plateaued  Thumb - gradually better Getting better     Edema (Circumference measured in cm)   3/19/2021 3/19/2021    L R   Long P1 4.9 5.2   PIP 5.3 5.8   P2 4.5 4.5   Ring P1 4.5 4.9   PIP 5.0 5.4   P2 4.0 4.0     Sensation   WNL throughout all nerve distributions; per patient report    ROM  Long Finger 3/19/2021 3/19/2021 4/1/21 4/15/21 4/29/21 8/24/21 9/30/21 12/2/21   AROM (PROM) L R R R R R R R   MCP 0/94 0/90 /104 0/95       PIP 0/100 -10/105 -10/110 -5/109 -9/ 14/ 9/ -8/   DIP 0/80 0/76 /86 0/83       GALLOWAY 274 261 290 282         Ring Finger 3/19/2021 3/19/2021 4/1/21 4/15/21 4/29/21 8/24/21 9/30/21 12/2/21   AROM (PROM) L R R R R R R R   MCP 0/90 0/89 /95 97       PIP 0/102 -14/105 -14/112 -9/117 7/ 10/ 15/ -5/   DIP 0/71 0/61 /66 64       GALLOWAY 263 241 259 269           Re: Selene Lyn   :   1965    Palpation 2021   Volar CMC Mild pain Mild pain No Pain   Thenars Mild pain Mild pain No Pain     Strength   (Measured in pounds)  Pain Report: 0-10/10   3/19/2021 3/19/2021 4/29/21 9/30/21 12/2/21   Trials L R R R R   1  2  3 49 44 50 46 57   Average 49 44        3 Pt Pinch 3/19/2021 3/19/2021   Trials L R   1   2  3 10 12   Average 10 12     Assessment:  Response to therapy has been Improvement to:  ROM of Fingers: PIP joint - Ext  Strength:   and pinch  Pain:  intensity of pain is decreased    Overall Assessment:  Patient's symptoms are resolving.  Patient is progressing well and is ready to decrease frequency of treatment in the clinic.  Patient is becoming more independent in home exercise program  STG/LTG:  STGoals have been reviewed and progress or achievement has occurred;  see goal sheet for details and updates.  LTGoals have been reviewed and progress or achievement has occurred:  see goal sheet for details and updates.    Plan:  Frequency/Duration:  1 more visit  Appropriateness of Rx I have re-evaluated this patient  and find that the nature, scope, duration and intensity of the therapy is appropriate for the medical condition of the patient.  Recommendations for Continued Therapy  none    Treatment Plan:  See patient for one more visit to increase independence in HEP.                 Re: Selene Lyn   :   1965    Home Exercise Program:  Hip Flexion Straight Leg Raise  EMR Notes  HEP - Sets 1  Reps 2# x 30  Sessions per day  Notes 1 x daily  Hip Abduction Straight Leg Raise  EMR Notes  HEP - Sets 1  Reps 2# x 30  Sessions per day  Notes 1 x daily  Functional Knee Extension with Tubing  EMR Notes  HEP - Sets 1  Reps 30  Sessions per day  Notes  Balance Single Leg Stance Supported and Unsupported  EMR Notes  HEP - Sets 3  Reps 30  Sessions per day 1  Notes  Toe Raises  EMR Notes  HEP - Sets 1  Reps 30  Sessions per day  Notes  Clamshell with Theraband  EMR Notes  HEP - Sets 1  Reps 30  Sessions per day  Notes  Side Stepping With Theraband  EMR Notes  HEP - Sets 1  Reps  Sessions per day  Notes 30-50 feet each direction, once per day  Monster Walks  EMR Notes  HEP - Sets 1  Reps 30-50 feet FW and BW  Sessions per day  Notes T-band at ankles  Knee Bends  EMR Notes  Re: Selene Lyn   :   1965    HEP - Sets 1  Reps 30 reps  Sessions per day 1 time a day  Notes with ball between the knees  Standing Hamstring Curl Knee Flexion  EMR Notes  HEP - Sets 1  Reps 2# x 30  Sessions per day 1 time a day  Notes    Next Visit:  Review HEP  Prepare for discharge                        Thank you for your referral.    INQUIRIES  Therapist:  Adelina Bush [facility] [place] [facility]  [id number] [place] [address]  Phone: [phone]  Fax: [phone]

## 2021-12-02 NOTE — PROGRESS NOTES
Progress Note - Hand Therapy  Current Date: 12/2/21  Reporting period is 9/30/21 to 12/2/21.    Subjective:   Subjective changes noted by patient:  It was really stiff this morning because I did not wear my splints last night.   Functional changes noted by patient:  Improvement in Household Chores - opening doors  Patient has noted adverse reaction to:  None    Objective:  Changes noted in objective findings: see below    Pain Level (Scale 0-10):   3/19/2021 4/1/21 4/15/21 4/29/21 8/24 9/30/21 12/2/21   At Rest 1 0 0 0/10 1/10 0-1/10 1/10   With Use 5 4-5 1-2 3/10 5/10 3-4/10 2/10     Pain Description:  Date 3/19/2021 4/29/21 8/24 9/30/21 12/2/21   Location long finger and ring finger PIP Long and ring finger Thenars, MF, RF MF, RF, occasionally thenars MF, RF, occasionally thenars   Pain Quality Pulling, aching Stiff, sore occasionally Stiff, sore through fingers  Shooting through thumb Stiff through fingers, occasionally shooting through thumb Stiff through fingers   Frequency constant   intermittant Constant Almost constant intermittent   Pain is worst  daytime, mostly in the morning morning Morning - fingers  Thumb - throughout the day Morning - fingers  Thumb - throughout the day Morning - fingers   Exacerbated by  none none Opening doors Opening jars, and doors Just stiff with use   Relieved by none Stretching, splints Splints, massage Massage, splints Massage, splints   Progression Gradually getting better Getting better Worsening Fingers - plateaued  Thumb - gradually better Getting better     Edema (Circumference measured in cm)   3/19/2021 3/19/2021    L R   Long P1 4.9 5.2   PIP 5.3 5.8   P2 4.5 4.5   Ring P1 4.5 4.9   PIP 5.0 5.4   P2 4.0 4.0     Sensation   WNL throughout all nerve distributions; per patient report    ROM  Long Finger 3/19/2021 3/19/2021 4/1/21 4/15/21 4/29/21 8/24/21 9/30/21 12/2/21   AROM (PROM) L R R R R R R R   MCP 0/94 0/90 /104 0/95       PIP 0/100 -10/105 -10/110 -5/109 -9/ 14/  9/ -8/   DIP 0/80 0/76 /86 0/83       GALLOWAY 274 261 290 282         Ring Finger 3/19/2021 3/19/2021 4/1/21 4/15/21 4/29/21 8/24/21 9/30/21 12/2/21   AROM (PROM) L R R R R R R R   MCP 0/90 0/89 /95 97       PIP 0/102 -14/105 -14/112 -9/117 7/ 10/ 15/ -5/   DIP 0/71 0/61 /66 64       GALLOWAY 263 241 259 269         Palpation 8/24/2021 9/30/21 12/2/21   Volar CMC Mild pain Mild pain No Pain   Thenars Mild pain Mild pain No Pain     Strength   (Measured in pounds)  Pain Report: 0-10/10   3/19/2021 3/19/2021 4/29/21 9/30/21 12/2/21   Trials L R R R R   1  2  3 49 44 50 46 57   Average 49 44        3 Pt Pinch 3/19/2021 3/19/2021   Trials L R   1   2  3 10 12   Average 10 12     Assessment:  Response to therapy has been Improvement to:  ROM of Fingers: PIP joint - Ext  Strength:   and pinch  Pain:  intensity of pain is decreased    Overall Assessment:  Patient's symptoms are resolving.  Patient is progressing well and is ready to decrease frequency of treatment in the clinic.  Patient is becoming more independent in home exercise program  STG/LTG:  STGoals have been reviewed and progress or achievement has occurred;  see goal sheet for details and updates.  LTGoals have been reviewed and progress or achievement has occurred:  see goal sheet for details and updates.    Plan:  Frequency/Duration:  1 more visit  Appropriateness of Rx I have re-evaluated this patient and find that the nature, scope, duration and intensity of the therapy is appropriate for the medical condition of the patient.  Recommendations for Continued Therapy  none    Treatment Plan:  See patient for one more visit to increase independence in HEP.     Home Exercise Program:  Hip Flexion Straight Leg Raise  EMR Notes  HEP - Sets 1  Reps 2# x 30  Sessions per day  Notes 1 x daily  Hip Abduction Straight Leg Raise  EMR Notes  HEP - Sets 1  Reps 2# x 30  Sessions per day  Notes 1 x daily  Functional Knee Extension with Tubing  EMR Notes  HEP - Sets 1  Reps  30  Sessions per day  Notes  Balance Single Leg Stance Supported and Unsupported  EMR Notes  HEP - Sets 3  Reps 30  Sessions per day 1  Notes  Toe Raises  EMR Notes  HEP - Sets 1  Reps 30  Sessions per day  Notes  Clamshell with Theraband  EMR Notes  HEP - Sets 1  Reps 30  Sessions per day  Notes  Side Stepping With Theraband  EMR Notes  HEP - Sets 1  Reps  Sessions per day  Notes 30-50 feet each direction, once per day  Monster Walks  EMR Notes  HEP - Sets 1  Reps 30-50 feet FW and BW  Sessions per day  Notes T-band at ankles  Knee Bends  EMR Notes  HEP - Sets 1  Reps 30 reps  Sessions per day 1 time a day  Notes with ball between the knees  Standing Hamstring Curl Knee Flexion  EMR Notes  HEP - Sets 1  Reps 2# x 30  Sessions per day 1 time a day  Notes    Next Visit:  Review HEP  Prepare for discharge

## 2021-12-07 ENCOUNTER — THERAPY VISIT (OUTPATIENT)
Dept: PHYSICAL THERAPY | Facility: CLINIC | Age: 56
End: 2021-12-07
Payer: COMMERCIAL

## 2021-12-07 DIAGNOSIS — M53.3 PAIN IN THE COCCYX: ICD-10-CM

## 2021-12-07 DIAGNOSIS — M25.561 ACUTE PAIN OF RIGHT KNEE: ICD-10-CM

## 2021-12-07 PROCEDURE — 97110 THERAPEUTIC EXERCISES: CPT | Mod: GP | Performed by: PHYSICAL THERAPIST

## 2021-12-07 PROCEDURE — 97140 MANUAL THERAPY 1/> REGIONS: CPT | Mod: GP | Performed by: PHYSICAL THERAPIST

## 2021-12-07 NOTE — LETTER
[facility]  [address]  [address]  [place] [address]  [phone]    2021    Re: Selene Lyn   :   1965  MRN:  [id number]   REFERRING PHYSICIAN:   Amparo GUZMAN [facility]    Date of Initial Evaluation:  20  Visits:  Rxs Used: 29  Reason for Referral:     Acute pain of right knee  Pain in the coccyx    PROGRESS  REPORT    Progress reporting period is from 21.       SUBJECTIVE  Subjective changes noted by patient:   Subjective: Threw back out right before Thanksgiving. Was bending over to get something out of dryer. Used mm relaxants which helped some. This also threw her tailbone off. Feels stinging.burning in coccyx. No bowel/bladder changes. LB is settled down now but tailbone still more bothersome. Difficulty sitting primarily.     Current pain level is 5/10  .     Previous pain level was   Initial Pain level: 7/10.   Changes in function:  Yes (See Goal flowsheet attached for changes in current functional level)  Adverse reaction to treatment or activity: activity - bending over for laundry    OBJECTIVE  Changes noted in objective findings:  Yes,   Objective: Lumbar ROM flexion to ankles, extension min loss, B SB min loss. Coccyx TTP at tip, slightly flexed, increased muscle tension of coccyxgeus and LA muscles.      ASSESSMENT/PLAN  Updated problem list and treatment plan: Diagnosis 1:  Coccyx/LBP  Pain -  hot/cold therapy, manual therapy, splint/taping/bracing/orthotics, self management, education, directional preference exercise and home program  Decreased ROM/flexibility - manual therapy and therapeutic exercise  Decreased joint mobility - manual therapy and therapeutic exercise  Decreased strength - therapeutic exercise and therapeutic activities  Decreased proprioception - neuro re-education and therapeutic activities  Inflammation -  self management/home program  Impaired muscle performance - neuro re-education  Decreased function - therapeutic activities  Re: Selene Lyn   :   1965    Impaired posture - neuro re-education  STG/LTGs have been met or progress has been made towards goals:  Yes (See Goal flow sheet completed today.)  Assessment of Progress: The patient's condition has exacerbated.  Self Management Plans:  Patient has been instructed in a home treatment program.  Patient  has been instructed in self management of symptoms.    Selene continues to require the following intervention to meet STG and LTG's:  PT    Recommendations:  This patient would benefit from continued therapy.     Frequency:  2 X a month, once daily  Duration:  for 2 months                    Thank you for your referral.    INQUIRIES  Therapist: Nicki Kapadia, MS, PT, [facility] [place] [facility]  [id number] [place] [address]  Phone: [phone]  Fax: [phone]

## 2021-12-07 NOTE — PROGRESS NOTES
Subjective:  HPI  Physical Exam                    Objective:  System    Physical Exam    General     ROS    Assessment/Plan:    PROGRESS  REPORT    Progress reporting period is from 12/07/21.       SUBJECTIVE  Subjective changes noted by patient:   Subjective: Threw back out right before Thanksgiving. Was bending over to get something out of dryer. Used mm relaxants which helped some. This also threw her tailbone off. Feels stinging.burning in coccyx. No bowel/bladder changes. LB is settled down now but tailbone still more bothersome. Difficulty sitting primarily.     Current pain level is 5/10  .     Previous pain level was   Initial Pain level: 7/10.   Changes in function:  Yes (See Goal flowsheet attached for changes in current functional level)  Adverse reaction to treatment or activity: activity - bending over for laundry    OBJECTIVE  Changes noted in objective findings:  Yes,   Objective: Lumbar ROM flexion to ankles, extension min loss, B SB min loss. Coccyx TTP at tip, slightly flexed, increased muscle tension of coccyxgeus and LA muscles.      ASSESSMENT/PLAN  Updated problem list and treatment plan: Diagnosis 1:  Coccyx/LBP  Pain -  hot/cold therapy, manual therapy, splint/taping/bracing/orthotics, self management, education, directional preference exercise and home program  Decreased ROM/flexibility - manual therapy and therapeutic exercise  Decreased joint mobility - manual therapy and therapeutic exercise  Decreased strength - therapeutic exercise and therapeutic activities  Decreased proprioception - neuro re-education and therapeutic activities  Inflammation - self management/home program  Impaired muscle performance - neuro re-education  Decreased function - therapeutic activities  Impaired posture - neuro re-education  STG/LTGs have been met or progress has been made towards goals:  Yes (See Goal flow sheet completed today.)  Assessment of Progress: The patient's condition has exacerbated.  Self  Management Plans:  Patient has been instructed in a home treatment program.  Patient  has been instructed in self management of symptoms.    Selene continues to require the following intervention to meet STG and LTG's:  PT    Recommendations:  This patient would benefit from continued therapy.     Frequency:  2 X a month, once daily  Duration:  for 2 months        Please refer to the daily flowsheet for treatment today, total treatment time and time spent performing 1:1 timed codes.

## 2021-12-14 ENCOUNTER — THERAPY VISIT (OUTPATIENT)
Dept: PHYSICAL THERAPY | Facility: CLINIC | Age: 56
End: 2021-12-14
Payer: COMMERCIAL

## 2021-12-14 DIAGNOSIS — M53.3 PAIN IN THE COCCYX: ICD-10-CM

## 2021-12-14 DIAGNOSIS — M25.561 ACUTE PAIN OF RIGHT KNEE: ICD-10-CM

## 2021-12-14 PROCEDURE — 97535 SELF CARE MNGMENT TRAINING: CPT | Mod: GP | Performed by: PHYSICAL THERAPIST

## 2021-12-14 PROCEDURE — 97140 MANUAL THERAPY 1/> REGIONS: CPT | Mod: GP | Performed by: PHYSICAL THERAPIST

## 2021-12-15 ENCOUNTER — LAB REQUISITION (OUTPATIENT)
Dept: LAB | Facility: CLINIC | Age: 56
End: 2021-12-15

## 2021-12-15 ENCOUNTER — TRANSFERRED RECORDS (OUTPATIENT)
Dept: PHYSICAL THERAPY | Facility: CLINIC | Age: 56
End: 2021-12-15
Payer: COMMERCIAL

## 2021-12-15 DIAGNOSIS — Z13.220 ENCOUNTER FOR SCREENING FOR LIPOID DISORDERS: ICD-10-CM

## 2021-12-15 DIAGNOSIS — Z13.1 ENCOUNTER FOR SCREENING FOR DIABETES MELLITUS: ICD-10-CM

## 2021-12-15 LAB
ANION GAP SERPL CALCULATED.3IONS-SCNC: 8 MMOL/L (ref 5–18)
BUN SERPL-MCNC: 16 MG/DL (ref 8–22)
CALCIUM SERPL-MCNC: 8.8 MG/DL (ref 8.5–10.5)
CHLORIDE BLD-SCNC: 108 MMOL/L (ref 98–107)
CHOLEST SERPL-MCNC: 201 MG/DL
CO2 SERPL-SCNC: 24 MMOL/L (ref 22–31)
CREAT SERPL-MCNC: 0.9 MG/DL (ref 0.6–1.1)
GFR SERPL CREATININE-BSD FRML MDRD: 72 ML/MIN/1.73M2
GLUCOSE BLD-MCNC: 89 MG/DL (ref 70–125)
HDLC SERPL-MCNC: 76 MG/DL
LDLC SERPL CALC-MCNC: 110 MG/DL
POTASSIUM BLD-SCNC: 4.5 MMOL/L (ref 3.5–5)
SODIUM SERPL-SCNC: 140 MMOL/L (ref 136–145)
TRIGL SERPL-MCNC: 74 MG/DL

## 2021-12-15 PROCEDURE — 80048 BASIC METABOLIC PNL TOTAL CA: CPT | Performed by: FAMILY MEDICINE

## 2021-12-15 PROCEDURE — 80061 LIPID PANEL: CPT | Performed by: FAMILY MEDICINE

## 2021-12-29 ENCOUNTER — THERAPY VISIT (OUTPATIENT)
Dept: PHYSICAL THERAPY | Facility: CLINIC | Age: 56
End: 2021-12-29
Payer: COMMERCIAL

## 2021-12-29 DIAGNOSIS — M25.561 ACUTE PAIN OF RIGHT KNEE: ICD-10-CM

## 2021-12-29 DIAGNOSIS — M53.3 PAIN IN THE COCCYX: ICD-10-CM

## 2021-12-29 PROCEDURE — 97140 MANUAL THERAPY 1/> REGIONS: CPT | Mod: GP | Performed by: PHYSICAL THERAPIST

## 2022-01-11 ENCOUNTER — THERAPY VISIT (OUTPATIENT)
Dept: PHYSICAL THERAPY | Facility: CLINIC | Age: 57
End: 2022-01-11
Payer: COMMERCIAL

## 2022-01-11 DIAGNOSIS — M25.561 ACUTE PAIN OF RIGHT KNEE: ICD-10-CM

## 2022-01-11 DIAGNOSIS — M53.3 PAIN IN THE COCCYX: ICD-10-CM

## 2022-01-11 PROCEDURE — 97140 MANUAL THERAPY 1/> REGIONS: CPT | Mod: GP | Performed by: PHYSICAL THERAPIST

## 2022-01-11 PROCEDURE — 97035 APP MDLTY 1+ULTRASOUND EA 15: CPT | Mod: GP | Performed by: PHYSICAL THERAPIST

## 2022-01-13 PROBLEM — M79.641 HAND PAIN, RIGHT: Status: RESOLVED | Noted: 2021-03-19 | Resolved: 2022-01-13

## 2022-01-15 ENCOUNTER — HEALTH MAINTENANCE LETTER (OUTPATIENT)
Age: 57
End: 2022-01-15

## 2022-02-02 ENCOUNTER — THERAPY VISIT (OUTPATIENT)
Dept: PHYSICAL THERAPY | Facility: CLINIC | Age: 57
End: 2022-02-02
Payer: COMMERCIAL

## 2022-02-02 DIAGNOSIS — M53.3 PAIN IN THE COCCYX: ICD-10-CM

## 2022-02-02 DIAGNOSIS — M25.561 ACUTE PAIN OF RIGHT KNEE: ICD-10-CM

## 2022-02-02 PROCEDURE — 97110 THERAPEUTIC EXERCISES: CPT | Mod: GP | Performed by: PHYSICAL THERAPIST

## 2022-02-02 PROCEDURE — 97035 APP MDLTY 1+ULTRASOUND EA 15: CPT | Mod: GP | Performed by: PHYSICAL THERAPIST

## 2022-02-02 PROCEDURE — 97140 MANUAL THERAPY 1/> REGIONS: CPT | Mod: GP | Performed by: PHYSICAL THERAPIST

## 2022-02-21 ENCOUNTER — THERAPY VISIT (OUTPATIENT)
Dept: PHYSICAL THERAPY | Facility: CLINIC | Age: 57
End: 2022-02-21
Payer: COMMERCIAL

## 2022-02-21 DIAGNOSIS — M25.561 ACUTE PAIN OF RIGHT KNEE: ICD-10-CM

## 2022-02-21 DIAGNOSIS — M53.3 PAIN IN THE COCCYX: ICD-10-CM

## 2022-02-21 PROCEDURE — 97110 THERAPEUTIC EXERCISES: CPT | Mod: GP | Performed by: PHYSICAL THERAPIST

## 2022-02-21 PROCEDURE — 97035 APP MDLTY 1+ULTRASOUND EA 15: CPT | Mod: GP | Performed by: PHYSICAL THERAPIST

## 2022-02-21 PROCEDURE — 97140 MANUAL THERAPY 1/> REGIONS: CPT | Mod: GP | Performed by: PHYSICAL THERAPIST

## 2022-02-21 NOTE — LETTER
[facility]  [address]  [address]  [facility] [address]  [phone]    2022    Re: Selene Lyn   :   1965  MRN:  [id number]   REFERRING PHYSICIAN:   Amparo GUZMAN [facility]    Date of Initial Evaluation:  10/27/22  Visits:  Rxs Used: 34  Reason for Referral:     Acute pain of right knee  Pain in the coccyx    PROGRESS  REPORT  Progress reporting period is from 22.       SUBJECTIVE  Subjective changes noted by patient:   Subjective: Was irritated for a week after last visit but now starting to feel better. Did start kegels. Also working on urge suppression, slight dribbles. Still some prickly around coccyx.     Current pain level is 4/10  .     Previous pain level was   Initial Pain level: 7/10.   Changes in function:  Yes (See Goal flowsheet attached for changes in current functional level)  Adverse reaction to treatment or activity: None    OBJECTIVE  Changes noted in objective findings:  Yes,   Objective: TTP all around distal sacrum and coccyx. Coccyx in slight flexion, limited  mobility into extension. Kegel strength 2+/5.      ASSESSMENT/PLAN  Updated problem list and treatment plan: Diagnosis 1:  Coccyx pain  Pain -  hot/cold therapy, US, manual therapy, splint/taping/bracing/orthotics, self management, education, directional preference exercise and home program  Decreased ROM/flexibility - manual therapy and therapeutic exercise  Decreased joint mobility - manual therapy and therapeutic exercise  Decreased strength - therapeutic exercise and therapeutic activities  Decreased proprioception - neuro re-education and therapeutic activities  Inflammation - self management/home program  Impaired muscle performance - neuro re-education  Decreased function - therapeutic activities  Impaired posture - neuro re-education  STG/LTGs have been met or  progress has been made towards goals:  Yes (See Goal flow sheet completed today.)  Re: Selene Lyn   :   1965      Assessment of Progress: The patient's condition is improving.  Self Management Plans:  Patient has been instructed in a home treatment program.  Patient  has been instructed in self management of symptoms.    Selene continues to require the following intervention to meet STG and LTG's:  PT    Recommendations:  This patient would benefit from continued therapy.     Frequency:  2 X a month, once daily  Duration:  for 3 months                Thank you for your referral.    INQUIRIES  Therapist: Nicki Kapadia, MS, PT, [facility]  [id number] [place] [address]  Phone: [phone]  Fax: [phone]

## 2022-02-23 NOTE — PROGRESS NOTES
Subjective:  HPI  Physical Exam                    Objective:  System    Physical Exam    General     ROS    Assessment/Plan:    PROGRESS  REPORT    Progress reporting period is from 02/21/22.       SUBJECTIVE  Subjective changes noted by patient:   Subjective: Was irritated for a week after last visit but now starting to feel better. Did start kegels. Also working on urge suppression, slight dribbles. Still some prickly around coccyx.     Current pain level is 4/10  .     Previous pain level was   Initial Pain level: 7/10.   Changes in function:  Yes (See Goal flowsheet attached for changes in current functional level)  Adverse reaction to treatment or activity: None    OBJECTIVE  Changes noted in objective findings:  Yes,   Objective: TTP all around distal sacrum and coccyx. Coccyx in slight flexion, limited  mobility into extension. Kegel strength 2+/5.      ASSESSMENT/PLAN  Updated problem list and treatment plan: Diagnosis 1:  Coccyx pain  Pain -  hot/cold therapy, US, manual therapy, splint/taping/bracing/orthotics, self management, education, directional preference exercise and home program  Decreased ROM/flexibility - manual therapy and therapeutic exercise  Decreased joint mobility - manual therapy and therapeutic exercise  Decreased strength - therapeutic exercise and therapeutic activities  Decreased proprioception - neuro re-education and therapeutic activities  Inflammation - self management/home program  Impaired muscle performance - neuro re-education  Decreased function - therapeutic activities  Impaired posture - neuro re-education  STG/LTGs have been met or progress has been made towards goals:  Yes (See Goal flow sheet completed today.)  Assessment of Progress: The patient's condition is improving.  Self Management Plans:  Patient has been instructed in a home treatment program.  Patient  has been instructed in self management of symptoms.    Selene continues to require the following intervention to  meet STG and LTG's:  PT    Recommendations:  This patient would benefit from continued therapy.     Frequency:  2 X a month, once daily  Duration:  for 3 months        Please refer to the daily flowsheet for treatment today, total treatment time and time spent performing 1:1 timed codes.

## 2022-03-08 ENCOUNTER — THERAPY VISIT (OUTPATIENT)
Dept: PHYSICAL THERAPY | Facility: CLINIC | Age: 57
End: 2022-03-08
Payer: COMMERCIAL

## 2022-03-08 DIAGNOSIS — M25.561 ACUTE PAIN OF RIGHT KNEE: ICD-10-CM

## 2022-03-08 DIAGNOSIS — M53.3 PAIN IN THE COCCYX: ICD-10-CM

## 2022-03-08 PROCEDURE — 97110 THERAPEUTIC EXERCISES: CPT | Mod: GP | Performed by: PHYSICAL THERAPIST

## 2022-03-08 PROCEDURE — 97035 APP MDLTY 1+ULTRASOUND EA 15: CPT | Mod: GP | Performed by: PHYSICAL THERAPIST

## 2022-03-08 PROCEDURE — 97140 MANUAL THERAPY 1/> REGIONS: CPT | Mod: GP | Performed by: PHYSICAL THERAPIST

## 2022-03-12 ENCOUNTER — HEALTH MAINTENANCE LETTER (OUTPATIENT)
Age: 57
End: 2022-03-12

## 2022-03-19 ENCOUNTER — OFFICE VISIT (OUTPATIENT)
Dept: URGENT CARE | Facility: URGENT CARE | Age: 57
End: 2022-03-19
Payer: COMMERCIAL

## 2022-03-19 ENCOUNTER — ANCILLARY PROCEDURE (OUTPATIENT)
Dept: GENERAL RADIOLOGY | Facility: CLINIC | Age: 57
End: 2022-03-19
Attending: PHYSICIAN ASSISTANT
Payer: COMMERCIAL

## 2022-03-19 VITALS
BODY MASS INDEX: 24.55 KG/M2 | HEART RATE: 97 BPM | RESPIRATION RATE: 16 BRPM | TEMPERATURE: 98.3 F | SYSTOLIC BLOOD PRESSURE: 124 MMHG | DIASTOLIC BLOOD PRESSURE: 80 MMHG | OXYGEN SATURATION: 98 % | WEIGHT: 131 LBS

## 2022-03-19 DIAGNOSIS — R05.9 COUGH: ICD-10-CM

## 2022-03-19 DIAGNOSIS — J45.31 MILD PERSISTENT ASTHMA WITH EXACERBATION: Primary | ICD-10-CM

## 2022-03-19 DIAGNOSIS — R07.89 CHEST PRESSURE: ICD-10-CM

## 2022-03-19 PROCEDURE — 93000 ELECTROCARDIOGRAM COMPLETE: CPT | Performed by: PHYSICIAN ASSISTANT

## 2022-03-19 PROCEDURE — 71046 X-RAY EXAM CHEST 2 VIEWS: CPT | Performed by: RADIOLOGY

## 2022-03-19 PROCEDURE — 99214 OFFICE O/P EST MOD 30 MIN: CPT | Performed by: PHYSICIAN ASSISTANT

## 2022-03-19 RX ORDER — PREDNISONE 20 MG/1
40 TABLET ORAL DAILY
Qty: 10 TABLET | Refills: 0 | Status: SHIPPED | OUTPATIENT
Start: 2022-03-19 | End: 2022-03-24

## 2022-03-19 RX ORDER — DEXAMETHASONE 4 MG/1
1 TABLET ORAL 2 TIMES DAILY
Qty: 12 G | Refills: 0 | Status: SHIPPED | OUTPATIENT
Start: 2022-03-19 | End: 2022-09-01

## 2022-03-19 NOTE — PROGRESS NOTES
SUBJECTIVE:   Selene Lyn is a 57 year old female presenting with a chief complaint of cough and upper back aches for the past 2 weeks.  Now over the past 2 days with chest tightness and with little SOB feeling.  Does have hx of asthma and using her inhaler.   Was on Flovent at one time during allergy season but out of med.   Denies fevers.  Does have allergies and nose is dry in nature.  Also notes that coughs some with eating but does have hx of reflux.  Cough is dry in nature.  Chest feels heavy.  Denies nausea, arm or jaw pain  Vaccinated for COVID  Severity mild  Current and Associated symptoms: no other associated sx.    Treatment measures tried include Fluids, Rest and albuterol and Advil   Predisposing factors include HX of asthma.    Past Medical History:   Diagnosis Date     Allergic rhinitis 4/20/2009     Problem list name updated by automated process. Provider to review     Diffuse cystic mastopathy      Mild intermittent asthma     alleries triggers include smells and seasonal allergies     Tachycardia, unspecified 12/99    ablated for re-entrant tachycardia     TMJ (temporomandibular joint syndrome) 1/11/2019     Current Outpatient Medications   Medication Sig Dispense Refill     albuterol (PROAIR HFA) 108 (90 Base) MCG/ACT inhaler Inhale 2 puffs into the lungs every 4 hours as needed for shortness of breath / dyspnea or wheezing 2 Inhaler 1     albuterol (PROAIR HFA/PROVENTIL HFA/VENTOLIN HFA) 108 (90 Base) MCG/ACT inhaler Inhale 1-2 puffs into the lungs every 4 hours as needed for shortness of breath / dyspnea or wheezing 18 g 0     Cholecalciferol (VITAMIN D3)        fluticasone (FLOVENT HFA) 110 MCG/ACT inhaler Inhale 1 puff into the lungs 2 times daily During allergy season as needed 3 Inhaler 3     Multiple Vitamin (MULTIVITAMIN OR) Take  by mouth.       pantoprazole (PROTONIX) 40 MG EC tablet Take 1 tablet (40 mg) by mouth daily DO NOT CRUSH. 90 tablet 3     VITAMIN E NATURAL PO        Social  History     Tobacco Use     Smoking status: Never Smoker     Smokeless tobacco: Never Used     Tobacco comment: exposed as a child but never smoked   Substance Use Topics     Alcohol use: Yes     Alcohol/week: 0.0 standard drinks     Comment: one drink every 3 months/        ROS:  Review of systems negative except as stated above.    OBJECTIVE:  /80 (BP Location: Right arm, Patient Position: Chair, Cuff Size: Adult Regular)   Pulse 97   Temp 98.3  F (36.8  C) (Oral)   Resp 16   Wt 59.4 kg (131 lb)   LMP  (LMP Unknown)   SpO2 98%   Breastfeeding No   BMI 24.55 kg/m    GENERAL APPEARANCE: healthy, alert and no distress  EYES: EOMI,  PERRL, conjunctiva clear  HENT: ear canals and TM's normal.  Nose and mouth without ulcers, erythema or lesions  NECK: supple, nontender, no lymphadenopathy  RESP: lungs clear to auscultation - no rales, rhonchi or wheezes  CV: regular rates and rhythm, normal S1 S2, no murmur noted  NEURO: Normal strength and tone, sensory exam grossly normal,  normal speech and mentation  SKIN: no suspicious lesions or rashes    Chest x-ray  No acute findings noted  Chest x-ray clear    EKG - NSR  No ST elevation noted    assessment/plan:  (J45.31) Mild persistent asthma with exacerbation  (primary encounter diagnosis)  Comment:     Plan: fluticasone (FLOVENT HFA) 110 MCG/ACT inhaler,         predniSONE (DELTASONE) 20 MG tablet           Will restart her Flovent and add short burst of Prednisone.  Side affects discussed.  OTC  Med for sx relief and red flag signs discussed  OTC med for sx relief and to Follow-up with PCP as needed    (R05.9) Cough  Comment:   Plan: XR Chest 2 Views, predniSONE (DELTASONE) 20 MG         tablet           Clear for pneumonia     (R07.89) Chest pressure  Comment:   Plan: EKG 12-lead complete w/read - Clinics,         predniSONE (DELTASONE) 20 MG tablet         As above  Normal EKG  Follow-up with PCP as needed

## 2022-03-23 ENCOUNTER — THERAPY VISIT (OUTPATIENT)
Dept: PHYSICAL THERAPY | Facility: CLINIC | Age: 57
End: 2022-03-23
Payer: COMMERCIAL

## 2022-03-23 DIAGNOSIS — M53.3 PAIN IN THE COCCYX: ICD-10-CM

## 2022-03-23 DIAGNOSIS — M25.561 ACUTE PAIN OF RIGHT KNEE: ICD-10-CM

## 2022-03-23 PROCEDURE — 97140 MANUAL THERAPY 1/> REGIONS: CPT | Mod: GP | Performed by: PHYSICAL THERAPIST

## 2022-03-23 PROCEDURE — 97035 APP MDLTY 1+ULTRASOUND EA 15: CPT | Mod: GP | Performed by: PHYSICAL THERAPIST

## 2022-04-18 ENCOUNTER — THERAPY VISIT (OUTPATIENT)
Dept: PHYSICAL THERAPY | Facility: CLINIC | Age: 57
End: 2022-04-18
Payer: COMMERCIAL

## 2022-04-18 DIAGNOSIS — M25.561 RIGHT KNEE PAIN: Primary | ICD-10-CM

## 2022-04-18 DIAGNOSIS — M53.3 PAIN IN THE COCCYX: ICD-10-CM

## 2022-04-18 PROCEDURE — 97535 SELF CARE MNGMENT TRAINING: CPT | Mod: GP | Performed by: PHYSICAL THERAPIST

## 2022-04-18 PROCEDURE — 97140 MANUAL THERAPY 1/> REGIONS: CPT | Mod: GP | Performed by: PHYSICAL THERAPIST

## 2022-04-18 NOTE — PROGRESS NOTES
Subjective:  HPI  Physical Exam                    Objective:  System    Physical Exam    General     ROS    Assessment/Plan:    PROGRESS  REPORT    Progress reporting period is from 04/18/22.       SUBJECTIVE  Subjective changes noted by patient:    Subjective: Traveling to [place] went well. Sitting on airplanes was ok, more difficulty with rental car. However, had her pillow along so that helped.LBP minimal and R knee just stiff, no severe pain. HEP going well.    Current pain level is  3/10.     Previous pain level was  7/10 Initial Pain level: 7/10.   Changes in function:  Yes (See Goal flowsheet attached for changes in current functional level)  Adverse reaction to treatment or activity: activity - sitting in rental car    OBJECTIVE  Changes noted in objective findings:    Objective: Coccyx still in slightly flexed position but unchanged. TTP along coccyx, L >R today.     ASSESSMENT/PLAN  Updated problem list and treatment plan: Diagnosis 1:  Coccyx pain  Pain -  hot/cold therapy, manual therapy, splint/taping/bracing/orthotics, self management, education, directional preference exercise and home program  Decreased ROM/flexibility - manual therapy and therapeutic exercise  Decreased joint mobility - manual therapy and therapeutic exercise  Decreased strength - therapeutic exercise and therapeutic activities  Decreased proprioception - neuro re-education and therapeutic activities  Inflammation - self management/home program  Impaired muscle performance - neuro re-education  Decreased function - therapeutic activities  Impaired posture - neuro re-education  STG/LTGs have been met or progress has been made towards goals:  Yes (See Goal flow sheet completed today.)  Assessment of Progress: The patient's condition is improving.  Self Management Plans:  Patient has been instructed in a home treatment program.  Patient  has been instructed in self management of symptoms.    Selene continues to require the following  intervention to meet STG and LTG's:  PT    Recommendations:  This patient would benefit from continued therapy.     Frequency:  1 X a month, once daily  Duration:  for 3 months        Please refer to the daily flowsheet for treatment today, total treatment time and time spent performing 1:1 timed codes.

## 2022-05-16 ENCOUNTER — THERAPY VISIT (OUTPATIENT)
Dept: PHYSICAL THERAPY | Facility: CLINIC | Age: 57
End: 2022-05-16
Payer: COMMERCIAL

## 2022-05-16 DIAGNOSIS — M53.3 PAIN IN THE COCCYX: ICD-10-CM

## 2022-05-16 DIAGNOSIS — M25.561 RIGHT KNEE PAIN: Primary | ICD-10-CM

## 2022-05-16 PROCEDURE — 97535 SELF CARE MNGMENT TRAINING: CPT | Mod: GP | Performed by: PHYSICAL THERAPIST

## 2022-05-16 PROCEDURE — 97140 MANUAL THERAPY 1/> REGIONS: CPT | Mod: GP | Performed by: PHYSICAL THERAPIST

## 2022-06-08 ENCOUNTER — THERAPY VISIT (OUTPATIENT)
Dept: PHYSICAL THERAPY | Facility: CLINIC | Age: 57
End: 2022-06-08
Payer: COMMERCIAL

## 2022-06-08 DIAGNOSIS — M53.3 PAIN IN THE COCCYX: ICD-10-CM

## 2022-06-08 DIAGNOSIS — M25.561 RIGHT KNEE PAIN: Primary | ICD-10-CM

## 2022-06-08 PROCEDURE — 97140 MANUAL THERAPY 1/> REGIONS: CPT | Mod: GP | Performed by: PHYSICAL THERAPIST

## 2022-06-08 PROCEDURE — 97110 THERAPEUTIC EXERCISES: CPT | Mod: GP | Performed by: PHYSICAL THERAPIST

## 2022-06-23 ENCOUNTER — THERAPY VISIT (OUTPATIENT)
Dept: PHYSICAL THERAPY | Facility: CLINIC | Age: 57
End: 2022-06-23
Payer: COMMERCIAL

## 2022-06-23 DIAGNOSIS — M25.561 RIGHT KNEE PAIN: Primary | ICD-10-CM

## 2022-06-23 DIAGNOSIS — M53.3 PAIN IN THE COCCYX: ICD-10-CM

## 2022-06-23 PROCEDURE — 97140 MANUAL THERAPY 1/> REGIONS: CPT | Mod: GP | Performed by: PHYSICAL THERAPIST

## 2022-07-08 NOTE — PROGRESS NOTES
SUBJECTIVE:   Selene Lyn is a 52 year old female with a history of mild intermittent asthma triggered by smells and seasonal allergies.  She is presenting with a chief complaint of cough (mild coughing attacks, throughout the day), wheezing (mild), mild chest tightness, sneezing, pressure/popping in the ears, scratchy throat, fatigue, chest congestion (feels tightness at the chest. ).  Onset of symptoms was two days ago.  Course of illness is worsening.      Current and Associated symptoms: stuffy nose.  .   Treatment measures tried include Fluticasone inhaler.  She has not taken her albuterol inhaler.  .  Predisposing factors include h/o asthma.    Past Medical History:   Diagnosis Date     Diffuse cystic mastopathy      Mild intermittent asthma     alleries triggers include smells and seasonal allergies     Tachycardia, unspecified 12/99    ablated for re-entrant tachycardia     Current Outpatient Prescriptions   Medication Sig Dispense Refill     albuterol (PROAIR HFA) 108 (90 BASE) MCG/ACT Inhaler Inhale 2 puffs into the lungs every 6 hours as needed for shortness of breath / dyspnea 1 Inhaler 3     pantoprazole (PROTONIX) 40 MG EC tablet Take 1 tablet (40 mg) by mouth daily 90 tablet 3     Naproxen Sodium (ALEVE PO)        VITAMIN E NATURAL PO        Multiple Vitamin (MULTIVITAMIN OR) Take  by mouth.       Cholecalciferol (VITAMIN D3)        azithromycin (ZITHROMAX) 250 MG tablet Two tablets first day, then one tablet daily for four days. (Patient not taking: Reported on 1/20/2018) 6 tablet 0     montelukast (SINGULAIR) 10 MG tablet Take 1 tablet (10 mg) by mouth At Bedtime (Patient not taking: Reported on 1/20/2018) 30 tablet 2     fluticasone (FLOVENT HFA) 110 MCG/ACT Inhaler Inhale 1 puff into the lungs 2 times daily During allergy season as needed (Patient not taking: Reported on 1/20/2018) 3 Inhaler 0     Social History   Substance Use Topics     Smoking status: Never Smoker     Smokeless tobacco: Never  Used      Comment: exposed as a child but never smoked     Alcohol use 0.0 oz/week     0 Standard drinks or equivalent per week      Comment: one drink every 3 months/        ROS:  Review of systems negative except as stated above.    OBJECTIVE:  /66  Pulse 93  Temp 99  F (37.2  C) (Oral)  Wt 120 lb (54.4 kg)  LMP  (LMP Unknown)  SpO2 97%  BMI 22.31 kg/m2  GENERAL APPEARANCE: healthy, alert and no distress.  No acute respiratory distress.    HENT: TM's normal bilaterally, nasal turbinates erythematous, swollen and oral mucous membranes moist, no erythema noted  NECK: supple, nontender, no lymphadenopathy  RESP: lungs clear to auscultation - no rales, rhonchi or wheezes  CV: regular rates and rhythm, normal S1 S2, no murmur noted    ASSESSMENT:  Asthma exacerbation  Cough    PLAN:  Restart the Albuterol MDI  Continue the Fluticasone inhaler  Rx:  Prednisone, Tessalon Perles  follow up with the primary care provider if not better in 7-10 days  Go to the emergency room if there is worsening, severe shortness of breath.  See orders in Epic    Jon Bartholomew MD     Not applicable

## 2022-08-04 ENCOUNTER — THERAPY VISIT (OUTPATIENT)
Dept: PHYSICAL THERAPY | Facility: CLINIC | Age: 57
End: 2022-08-04
Payer: COMMERCIAL

## 2022-08-04 DIAGNOSIS — M25.561 RIGHT KNEE PAIN: Primary | ICD-10-CM

## 2022-08-04 DIAGNOSIS — M53.3 PAIN IN THE COCCYX: ICD-10-CM

## 2022-08-04 PROCEDURE — 97140 MANUAL THERAPY 1/> REGIONS: CPT | Mod: GP | Performed by: PHYSICAL THERAPIST

## 2022-08-04 PROCEDURE — 97110 THERAPEUTIC EXERCISES: CPT | Mod: GP | Performed by: PHYSICAL THERAPIST

## 2022-08-08 NOTE — PROGRESS NOTES
Subjective:  HPI  Physical Exam                    Objective:  System    Physical Exam    General     ROS    Assessment/Plan:    PROGRESS  REPORT    Progress reporting period is from 08/04/22       SUBJECTIVE  Subjective changes noted by patient:   Subjective: Flare up~ 3 weeks ago of B leg/ischial tuberosity pain and coccyx pain. Especially worse with sitting/standing still.Every other week will have a day where has ~ 4 bowel movements and then feels pressure in perineal area.Advil 1x day in morning for various aches/pain.Not been as c onsistent with HEP.    Current pain level is  Current Pain level: 5/10.     Previous pain level was   Initial Pain level: 7/10.   Changes in function:  Yes (See Goal flowsheet attached for changes in current functional level)  Adverse reaction to treatment or activity: None    OBJECTIVE  Changes noted in objective findings:    Objective: TTP B ischial tuberosities/proximal to mid hamstrings. TTP around distal coccyx.     ASSESSMENT/PLAN  Updated problem list and treatment plan: Diagnosis 1:  Coccyx pain  Pain -  US, manual therapy, splint/taping/bracing/orthotics, self management, education, directional preference exercise and home program  Decreased ROM/flexibility - manual therapy and therapeutic exercise  Decreased strength - therapeutic exercise and therapeutic activities  Decreased proprioception - neuro re-education and therapeutic activities  Inflammation - self management/home program  Impaired muscle performance - neuro re-education  Decreased function - therapeutic activities  Impaired posture - neuro re-education  STG/LTGs have been met or progress has been made towards goals:  Yes (See Goal flow sheet completed today.)  Assessment of Progress: The patient's condition has exacerbated.  Self Management Plans:  Patient has been instructed in a home treatment program.  Patient  has been instructed in self management of symptoms.    Selene continues to require the following  intervention to meet STG and LTG's:  PT    Recommendations:  This patient would benefit from continued therapy.     Frequency:  1 X a month, once daily  Duration:  for 3 months        Please refer to the daily flowsheet for treatment today, total treatment time and time spent performing 1:1 timed codes.

## 2022-08-29 DIAGNOSIS — J45.31 MILD PERSISTENT ASTHMA WITH EXACERBATION: ICD-10-CM

## 2022-09-01 ENCOUNTER — THERAPY VISIT (OUTPATIENT)
Dept: PHYSICAL THERAPY | Facility: CLINIC | Age: 57
End: 2022-09-01
Payer: COMMERCIAL

## 2022-09-01 DIAGNOSIS — M25.561 RIGHT KNEE PAIN: Primary | ICD-10-CM

## 2022-09-01 DIAGNOSIS — M53.3 PAIN IN THE COCCYX: ICD-10-CM

## 2022-09-01 PROCEDURE — 97140 MANUAL THERAPY 1/> REGIONS: CPT | Mod: GP | Performed by: PHYSICAL THERAPIST

## 2022-09-01 PROCEDURE — 97110 THERAPEUTIC EXERCISES: CPT | Mod: GP | Performed by: PHYSICAL THERAPIST

## 2022-09-01 RX ORDER — DEXAMETHASONE 4 MG/1
TABLET ORAL
Qty: 12 G | Refills: 0 | Status: SHIPPED | OUTPATIENT
Start: 2022-09-01

## 2022-09-13 ENCOUNTER — THERAPY VISIT (OUTPATIENT)
Dept: PHYSICAL THERAPY | Facility: CLINIC | Age: 57
End: 2022-09-13
Payer: COMMERCIAL

## 2022-09-13 DIAGNOSIS — M25.561 RIGHT KNEE PAIN: Primary | ICD-10-CM

## 2022-09-13 DIAGNOSIS — M53.3 PAIN IN THE COCCYX: ICD-10-CM

## 2022-09-13 PROCEDURE — 97140 MANUAL THERAPY 1/> REGIONS: CPT | Mod: GP | Performed by: PHYSICAL THERAPIST

## 2022-10-06 ENCOUNTER — THERAPY VISIT (OUTPATIENT)
Dept: PHYSICAL THERAPY | Facility: CLINIC | Age: 57
End: 2022-10-06
Payer: COMMERCIAL

## 2022-10-06 DIAGNOSIS — M53.3 PAIN IN THE COCCYX: ICD-10-CM

## 2022-10-06 DIAGNOSIS — M25.561 RIGHT KNEE PAIN: Primary | ICD-10-CM

## 2022-10-06 PROCEDURE — 97140 MANUAL THERAPY 1/> REGIONS: CPT | Mod: GP | Performed by: PHYSICAL THERAPIST

## 2022-10-27 ENCOUNTER — THERAPY VISIT (OUTPATIENT)
Dept: PHYSICAL THERAPY | Facility: CLINIC | Age: 57
End: 2022-10-27
Payer: COMMERCIAL

## 2022-10-27 DIAGNOSIS — M25.561 ACUTE PAIN OF RIGHT KNEE: Primary | ICD-10-CM

## 2022-10-27 DIAGNOSIS — M53.3 PAIN IN THE COCCYX: ICD-10-CM

## 2022-10-27 PROCEDURE — 97110 THERAPEUTIC EXERCISES: CPT | Mod: GP | Performed by: PHYSICAL THERAPIST

## 2022-10-27 PROCEDURE — 97140 MANUAL THERAPY 1/> REGIONS: CPT | Mod: GP | Performed by: PHYSICAL THERAPIST

## 2022-10-27 NOTE — LETTER
[facility]  [address]  [address]  [facility] [address]  [phone]    2022    Re: Selene Lyn   :   1965  MRN:  [id number]   REFERRING PHYSICIAN:   Amparo GUZMAN [facility]    Date of Initial Evaluation:  2020  Visits:  Rxs Used: 45  Reason for Referral:     Acute pain of right knee  Pain in the coccyx    Assessment/Plan:    PROGRESS  REPORT    Progress reporting period is from 10/27/22.       SUBJECTIVE  Subjective changes noted by patient:   Subjective: B sit bones more sore the past 3-4 days, not sure why. Tailbone pain slight tingling/pokey feeling. Hasn't had to sit for long periods. With working from home, uses cushion and is able to get up/down every 30 minutes.    Current pain level is  Current Pain level: 2/10.     Previous pain level was   Initial Pain level: 7/10.   Changes in function:  Yes (See Goal flowsheet attached for changes in current functional level)  Adverse reaction to treatment or activity: activity - sitting on hard surface or too long on soft surface    OBJECTIVE  Changes noted in objective findings:  Yes,   Objective: Increased B hamstring tightness, R>L. TTP along origin at IT's B. Advised to start hamstring stretching daily(doorway or 90/90 supine to keep lumbar spine neutal). Coccyx slight flexion. Lumbar AROM flexion WNL, extension min loss.     ASSESSMENT/PLAN  Updated problem list and treatment plan: Diagnosis 1:  Coccyx/B hamstring pain  Pain -  hot/cold therapy, US, manual therapy, splint/taping/bracing/orthotics, self management, education, directional preference exercise and home program  Decreased ROM/flexibility - manual therapy and therapeutic exercise  Decreased strength - therapeutic exercise and therapeutic activities  Decreased proprioception - neuro re-education and therapeutic  activities  Inflammation - self management/home program  Re: Selene Lyn   :   1965    Impaired muscle performance - neuro re-education  Decreased function - therapeutic activities  Impaired posture - neuro re-education  STG/LTGs have been met or progress has been made towards goals:  Yes (See Goal flow sheet completed today.)  Assessment of Progress: The patient's condition is improving.  Self Management Plans:  Patient has been instructed in a home treatment program.  Patient  has been instructed in self management of symptoms.    Selene continues to require the following intervention to meet STG and LTG's:  PT    Recommendations:  This patient would benefit from continued therapy.     Frequency:  1 X a month, once daily  Duration:  for 3 months    Thank you for your referral.    INQUIRIES  Therapist:Nicki Kapadia, MS, PT, [facility] [address] [address]  Phone: [phone]  Fax: [phone]

## 2022-10-27 NOTE — PROGRESS NOTES
Subjective:  HPI  Physical Exam                    Objective:  System    Physical Exam    General     ROS    Assessment/Plan:    PROGRESS  REPORT    Progress reporting period is from 10/27/22.       SUBJECTIVE  Subjective changes noted by patient:   Subjective: B sit bones more sore the past 3-4 days, not sure why. Tailbone pain slight tingling/pokey feeling. Hasn't had to sit for long periods. With working from home, uses cushion and is able to get up/down every 30 minutes.    Current pain level is  Current Pain level: 2/10.     Previous pain level was   Initial Pain level: 7/10.   Changes in function:  Yes (See Goal flowsheet attached for changes in current functional level)  Adverse reaction to treatment or activity: activity - sitting on hard surface or too long on soft surface    OBJECTIVE  Changes noted in objective findings:  Yes,   Objective: Increased B hamstring tightness, R>L. TTP along origin at IT's B. Advised to start hamstring stretching daily(doorway or 90/90 supine to keep lumbar spine neutal). Coccyx slight flexion. Lumbar AROM flexion WNL, extension min loss.     ASSESSMENT/PLAN  Updated problem list and treatment plan: Diagnosis 1:  Coccyx/B hamstring pain  Pain -  hot/cold therapy, US, manual therapy, splint/taping/bracing/orthotics, self management, education, directional preference exercise and home program  Decreased ROM/flexibility - manual therapy and therapeutic exercise  Decreased strength - therapeutic exercise and therapeutic activities  Decreased proprioception - neuro re-education and therapeutic activities  Inflammation - self management/home program  Impaired muscle performance - neuro re-education  Decreased function - therapeutic activities  Impaired posture - neuro re-education  STG/LTGs have been met or progress has been made towards goals:  Yes (See Goal flow sheet completed today.)  Assessment of Progress: The patient's condition is improving.  Self Management Plans:  Patient  has been instructed in a home treatment program.  Patient  has been instructed in self management of symptoms.    Selene continues to require the following intervention to meet STG and LTG's:  PT    Recommendations:  This patient would benefit from continued therapy.     Frequency:  1 X a month, once daily  Duration:  for 3 months        Please refer to the daily flowsheet for treatment today, total treatment time and time spent performing 1:1 timed codes.           PAST SURGICAL HISTORY:  H/O breast biopsy left    Renal calculi x2

## 2022-11-28 ENCOUNTER — THERAPY VISIT (OUTPATIENT)
Dept: PHYSICAL THERAPY | Facility: CLINIC | Age: 57
End: 2022-11-28
Payer: COMMERCIAL

## 2022-11-28 DIAGNOSIS — M25.561 ACUTE PAIN OF RIGHT KNEE: Primary | ICD-10-CM

## 2022-11-28 DIAGNOSIS — M53.3 PAIN IN THE COCCYX: ICD-10-CM

## 2022-11-28 PROCEDURE — 97110 THERAPEUTIC EXERCISES: CPT | Mod: GP | Performed by: PHYSICAL THERAPIST

## 2022-11-28 PROCEDURE — 97140 MANUAL THERAPY 1/> REGIONS: CPT | Mod: GP | Performed by: PHYSICAL THERAPIST

## 2022-12-20 ENCOUNTER — TRANSCRIBE ORDERS (OUTPATIENT)
Dept: OTHER | Age: 57
End: 2022-12-20

## 2022-12-20 DIAGNOSIS — N39.41 URGE INCONTINENCE: Primary | ICD-10-CM

## 2022-12-27 ENCOUNTER — OFFICE VISIT (OUTPATIENT)
Dept: FAMILY MEDICINE | Facility: CLINIC | Age: 57
End: 2022-12-27
Payer: COMMERCIAL

## 2022-12-27 VITALS
SYSTOLIC BLOOD PRESSURE: 106 MMHG | TEMPERATURE: 98.3 F | OXYGEN SATURATION: 96 % | WEIGHT: 134 LBS | HEART RATE: 92 BPM | BODY MASS INDEX: 25.11 KG/M2 | DIASTOLIC BLOOD PRESSURE: 70 MMHG | RESPIRATION RATE: 14 BRPM

## 2022-12-27 DIAGNOSIS — R42 DIZZINESS: Primary | ICD-10-CM

## 2022-12-27 DIAGNOSIS — H65.193 ACUTE EFFUSION OF BOTH MIDDLE EARS: ICD-10-CM

## 2022-12-27 LAB
BASOPHILS # BLD AUTO: 0 10E3/UL (ref 0–0.2)
BASOPHILS NFR BLD AUTO: 0 %
EOSINOPHIL # BLD AUTO: 0.1 10E3/UL (ref 0–0.7)
EOSINOPHIL NFR BLD AUTO: 1 %
ERYTHROCYTE [DISTWIDTH] IN BLOOD BY AUTOMATED COUNT: 11.9 % (ref 10–15)
FLUAV AG SPEC QL IA: NEGATIVE
FLUBV AG SPEC QL IA: NEGATIVE
GLUCOSE BLD-MCNC: 103 MG/DL (ref 60–99)
HCT VFR BLD AUTO: 40.3 % (ref 35–47)
HGB BLD-MCNC: 13.4 G/DL (ref 11.7–15.7)
IMM GRANULOCYTES # BLD: 0 10E3/UL
IMM GRANULOCYTES NFR BLD: 0 %
LYMPHOCYTES # BLD AUTO: 1.9 10E3/UL (ref 0.8–5.3)
LYMPHOCYTES NFR BLD AUTO: 25 %
MCH RBC QN AUTO: 28.7 PG (ref 26.5–33)
MCHC RBC AUTO-ENTMCNC: 33.3 G/DL (ref 31.5–36.5)
MCV RBC AUTO: 86 FL (ref 78–100)
MONOCYTES # BLD AUTO: 0.5 10E3/UL (ref 0–1.3)
MONOCYTES NFR BLD AUTO: 7 %
NEUTROPHILS # BLD AUTO: 5.1 10E3/UL (ref 1.6–8.3)
NEUTROPHILS NFR BLD AUTO: 67 %
PLATELET # BLD AUTO: 274 10E3/UL (ref 150–450)
RBC # BLD AUTO: 4.67 10E6/UL (ref 3.8–5.2)
WBC # BLD AUTO: 7.6 10E3/UL (ref 4–11)

## 2022-12-27 PROCEDURE — 93005 ELECTROCARDIOGRAM TRACING: CPT | Performed by: PHYSICIAN ASSISTANT

## 2022-12-27 PROCEDURE — 87804 INFLUENZA ASSAY W/OPTIC: CPT | Performed by: PHYSICIAN ASSISTANT

## 2022-12-27 PROCEDURE — 99214 OFFICE O/P EST MOD 30 MIN: CPT | Mod: CS | Performed by: PHYSICIAN ASSISTANT

## 2022-12-27 PROCEDURE — 93010 ELECTROCARDIOGRAM REPORT: CPT | Performed by: INTERNAL MEDICINE

## 2022-12-27 PROCEDURE — U0005 INFEC AGEN DETEC AMPLI PROBE: HCPCS | Performed by: PHYSICIAN ASSISTANT

## 2022-12-27 PROCEDURE — 36415 COLL VENOUS BLD VENIPUNCTURE: CPT | Performed by: PHYSICIAN ASSISTANT

## 2022-12-27 PROCEDURE — U0003 INFECTIOUS AGENT DETECTION BY NUCLEIC ACID (DNA OR RNA); SEVERE ACUTE RESPIRATORY SYNDROME CORONAVIRUS 2 (SARS-COV-2) (CORONAVIRUS DISEASE [COVID-19]), AMPLIFIED PROBE TECHNIQUE, MAKING USE OF HIGH THROUGHPUT TECHNOLOGIES AS DESCRIBED BY CMS-2020-01-R: HCPCS | Performed by: PHYSICIAN ASSISTANT

## 2022-12-27 PROCEDURE — 82947 ASSAY GLUCOSE BLOOD QUANT: CPT | Performed by: PHYSICIAN ASSISTANT

## 2022-12-27 PROCEDURE — 85025 COMPLETE CBC W/AUTO DIFF WBC: CPT | Performed by: PHYSICIAN ASSISTANT

## 2022-12-27 RX ORDER — FLUTICASONE PROPIONATE 50 MCG
2 SPRAY, SUSPENSION (ML) NASAL DAILY
Qty: 9.9 ML | Refills: 0 | Status: SHIPPED | OUTPATIENT
Start: 2022-12-27 | End: 2023-01-10

## 2022-12-27 NOTE — PROGRESS NOTES
"  Assessment & Plan:      Problem List Items Addressed This Visit    None  Visit Diagnoses     Dizziness    -  Primary    Relevant Orders    EKG 12-lead, tracing only (Completed)    CBC with platelets and differential (Completed)    Glucose, whole blood (Completed)    Symptomatic COVID-19 Virus (Coronavirus) by PCR Nose    Influenza A & B Antigen - Clinic Collect    Acute effusion of both middle ears        Relevant Medications    fluticasone (FLONASE) 50 MCG/ACT nasal spray        Medical Decision Making  Patient with history of asthma presents with dizziness and headaches for 3 to 4 days.  EKG shows normal sinus rhythm.  CBC is negative for anemia and has a normal white blood cell count.  Blood glucose levels appear normal with no signs of hypoglycemia.  Patient does exhibit some moderate ear effusion bilaterally possibly affecting sensation of balance.  Recommend trial of nasal steroids.  Patient's heart \"flutter\" sensation is likely some benign PVCs.  Provided patient with reassurance.  Discussed treatment and symptomatic care.  Allergies and medication interactions reviewed.  Discussed signs of worsening symptoms and when to follow-up with PCP if no symptom improvement.     Subjective:      Selene Lyn is a 57 year old female with history of asthma, here for evaluation of dizziness and headaches.  Onset of symptoms was 3 to 4 days ago.  Associated symptoms include slight cough, chest tightness, and congestion.  Patient describes dizziness as lightheadedness.  Her symptoms will last about 20 minutes at a time.  Symptoms seem to come and go is a pleased with no provoking factors or relieving factors.  Patient had a \"fluttering\" heart sensation twice that lasted several seconds since the onset of her symptoms.  Patient had a negative COVID-19 test at home.     The following portions of the patient's history were reviewed and updated as appropriate: allergies, current medications, and problem list.     Review of " Systems  Pertinent items are noted in HPI.    Allergies  Allergies   Allergen Reactions     Doxycycline Cramps     Erythrocin Diarrhea     Penicillins      Sulfa Drugs        Family History   Problem Relation Age of Onset     Diabetes Maternal Grandmother      Cancer Mother         lung cancer   69     Diabetes Sister 40        obesity     Breast Cancer No family hx of         cystic mastopathy     Cancer - colorectal No family hx of      C.A.D. No family hx of      Eye Disorder No family hx of      Depression No family hx of        Social History     Tobacco Use     Smoking status: Never     Smokeless tobacco: Never     Tobacco comments:     exposed as a child but never smoked   Substance Use Topics     Alcohol use: Yes     Alcohol/week: 0.0 standard drinks     Comment: one drink every 3 months/         Objective:      /70   Pulse 92   Temp 98.3  F (36.8  C) (Oral)   Resp 14   Wt 60.8 kg (134 lb)   LMP  (LMP Unknown)   SpO2 96%   BMI 25.11 kg/m    General appearance - alert, well appearing, and in no distress and non-toxic  Ears - bilateral TM's and external ear canals normal  Nose - normal and patent, no erythema, discharge or polyps  Mouth - mucous membranes moist, pharynx normal without lesions  Neck - supple, no significant adenopathy  Chest - clear to auscultation, no wheezes, rales or rhonchi, symmetric air entry  Heart - normal rate, regular rhythm, normal S1, S2, no murmurs, rubs, clicks or gallops     Lab & Imaging Results    Results for orders placed or performed in visit on 22   Glucose, whole blood     Status: Abnormal   Result Value Ref Range    Glucose Whole Blood 103 (H) 60 - 99 mg/dL   CBC with platelets and differential     Status: None   Result Value Ref Range    WBC Count 7.6 4.0 - 11.0 10e3/uL    RBC Count 4.67 3.80 - 5.20 10e6/uL    Hemoglobin 13.4 11.7 - 15.7 g/dL    Hematocrit 40.3 35.0 - 47.0 %    MCV 86 78 - 100 fL    MCH 28.7 26.5 - 33.0 pg    MCHC 33.3 31.5 - 36.5  g/dL    RDW 11.9 10.0 - 15.0 %    Platelet Count 274 150 - 450 10e3/uL    % Neutrophils 67 %    % Lymphocytes 25 %    % Monocytes 7 %    % Eosinophils 1 %    % Basophils 0 %    % Immature Granulocytes 0 %    Absolute Neutrophils 5.1 1.6 - 8.3 10e3/uL    Absolute Lymphocytes 1.9 0.8 - 5.3 10e3/uL    Absolute Monocytes 0.5 0.0 - 1.3 10e3/uL    Absolute Eosinophils 0.1 0.0 - 0.7 10e3/uL    Absolute Basophils 0.0 0.0 - 0.2 10e3/uL    Absolute Immature Granulocytes 0.0 <=0.4 10e3/uL   EKG 12-lead, tracing only     Status: None (Preliminary result)   Result Value Ref Range    Systolic Blood Pressure  mmHg    Diastolic Blood Pressure  mmHg    Ventricular Rate 73 BPM    Atrial Rate 73 BPM    DE Interval 138 ms    QRS Duration 76 ms     ms    QTc 434 ms    P Axis 24 degrees    R AXIS 43 degrees    T Axis 61 degrees    Interpretation ECG       Sinus rhythm  Normal ECG  When compared with ECG of 05-APR-2011 08:27,  No significant change was found     CBC with platelets and differential     Status: None    Narrative    The following orders were created for panel order CBC with platelets and differential.  Procedure                               Abnormality         Status                     ---------                               -----------         ------                     CBC with platelets and d...[[id number]]                      Final result                 Please view results for these tests on the individual orders.       I personally reviewed these results and discussed findings with the patient.    The use of Dragon/[employer] services was used to construct the content of this note; any grammatical errors are non-intentional. Please contact the author directly if you are in need of any clarification.

## 2022-12-28 LAB
ATRIAL RATE - MUSE: 73 BPM
DIASTOLIC BLOOD PRESSURE - MUSE: NORMAL MMHG
INTERPRETATION ECG - MUSE: NORMAL
P AXIS - MUSE: 24 DEGREES
PR INTERVAL - MUSE: 138 MS
QRS DURATION - MUSE: 76 MS
QT - MUSE: 394 MS
QTC - MUSE: 434 MS
R AXIS - MUSE: 43 DEGREES
SARS-COV-2 RNA RESP QL NAA+PROBE: NEGATIVE
SYSTOLIC BLOOD PRESSURE - MUSE: NORMAL MMHG
T AXIS - MUSE: 61 DEGREES
VENTRICULAR RATE- MUSE: 73 BPM

## 2022-12-29 ENCOUNTER — TELEPHONE (OUTPATIENT)
Dept: ADMINISTRATIVE | Facility: CLINIC | Age: 57
End: 2022-12-29

## 2022-12-29 DIAGNOSIS — H65.193 ACUTE EFFUSION OF BOTH MIDDLE EARS: ICD-10-CM

## 2022-12-29 RX ORDER — FLUTICASONE PROPIONATE 50 MCG
2 SPRAY, SUSPENSION (ML) NASAL DAILY
Qty: 9.9 ML | Refills: 0 | OUTPATIENT
Start: 2022-12-29

## 2022-12-29 NOTE — TELEPHONE ENCOUNTER
Fax request from pharmacy for refills at patient request.  Patient also requesting 90 day supply of medication to be dispensed.

## 2023-01-13 ENCOUNTER — THERAPY VISIT (OUTPATIENT)
Dept: PHYSICAL THERAPY | Facility: CLINIC | Age: 58
End: 2023-01-13
Payer: COMMERCIAL

## 2023-01-13 DIAGNOSIS — M25.561 ACUTE PAIN OF RIGHT KNEE: Primary | ICD-10-CM

## 2023-01-13 DIAGNOSIS — M53.3 PAIN IN THE COCCYX: ICD-10-CM

## 2023-01-13 PROCEDURE — 97110 THERAPEUTIC EXERCISES: CPT | Mod: GP | Performed by: PHYSICAL THERAPIST

## 2023-01-13 PROCEDURE — 97140 MANUAL THERAPY 1/> REGIONS: CPT | Mod: GP | Performed by: PHYSICAL THERAPIST

## 2023-01-13 NOTE — LETTER
THOMAS [facility]  [address] [address]  [address]  [facility] [address]  [phone]    2023    Re: Selene Lyn   :   1965  MRN:  [id number]   REFERRING PHYSICIAN:   Amparo GUZMAN [facility]    Date of Initial Evaluation: 20  Visits:  Rxs Used: 47  Reason for Referral:     Acute pain of right knee  Pain in the coccyx    PROGRESS  REPORT    Progress reporting period is from 10/27/22 to 23.       SUBJECTIVE  Subjective changes noted by patient:  .  Subjective: Returns to PT today (last visit 22). Has been trying to stand more and then mid-back gets sore. Shoveling also doesn't help.Coccyx/IT pain comes and goes but overall trying to sit less.    Current pain level is 2/10  .     Previous pain level was  5/10 Initial Pain level: 7/10.   Changes in function:  Yes (See Goal flowsheet attached for changes in current functional level)  Adverse reaction to treatment or activity: activity - sitting    OBJECTIVE  Changes noted in objective findings:  Yes,   Objective: Added upper back strength with rows/pulldowns as posture poor. True hip extension 0 deg, added stretch at home for this to avoid lumbar lordosis. Coccyx no lateral deviation, slight flexion, tightness B proximal hamstring insertions.     ASSESSMENT/PLAN  Updated problem list and treatment plan: Diagnosis 1:  Coccyx/hamstring pain  Pain -  US, manual therapy, splint/taping/bracing/orthotics, self management, education, directional preference exercise and home program  Decreased ROM/flexibility - manual therapy and therapeutic exercise  Decreased strength - therapeutic exercise and therapeutic activities  Decreased proprioception - neuro re-education and therapeutic activities  Inflammation - self management/home program  Impaired muscle performance - neuro re-education  Decreased function -  therapeutic activities  Impaired posture - neuro re-education  Re: Selene Lyn   :   1965    STG/LTGs have been met or progress has been made towards goals:  Yes (See Goal flow sheet completed today.)  Assessment of Progress: The patient's progress has slowed.  Self Management Plans:  Patient has been instructed in a home treatment program.  Patient  has been instructed in self management of symptoms.    Selene continues to require the following intervention to meet STG and LTG's:  PT    Recommendations:  This patient would benefit from continued therapy.     Frequency:  2 X a month, once daily  Duration:  for 2 months            Thank you for your referral.    INQUIRIES  Therapist: Nicki Kapadia, MS, PT, [facility]  [id number] [place], [place] [id number]  Phone: [phone]  Fax: [phone]

## 2023-01-17 NOTE — PROGRESS NOTES
Subjective:  HPI  Physical Exam                    Objective:  System    Physical Exam    General     ROS    Assessment/Plan:    PROGRESS  REPORT    Progress reporting period is from 10/27/22 to 01/17/23.       SUBJECTIVE  Subjective changes noted by patient:  .  Subjective: Returns to PT today (last visit 11/28/22). Has been trying to stand more and then mid-back gets sore. Shoveling also doesn't help.Coccyx/IT pain comes and goes but overall trying to sit less.    Current pain level is 2/10  .     Previous pain level was  5/10 Initial Pain level: 7/10.   Changes in function:  Yes (See Goal flowsheet attached for changes in current functional level)  Adverse reaction to treatment or activity: activity - sitting    OBJECTIVE  Changes noted in objective findings:  Yes,   Objective: Added upper back strength with rows/pulldowns as posture poor. True hip extension 0 deg, added stretch at home for this to avoid lumbar lordosis. Coccyx no lateral deviation, slight flexion, tightness B proximal hamstring insertions.     ASSESSMENT/PLAN  Updated problem list and treatment plan: Diagnosis 1:  Coccyx/hamstring pain  Pain -  US, manual therapy, splint/taping/bracing/orthotics, self management, education, directional preference exercise and home program  Decreased ROM/flexibility - manual therapy and therapeutic exercise  Decreased strength - therapeutic exercise and therapeutic activities  Decreased proprioception - neuro re-education and therapeutic activities  Inflammation - self management/home program  Impaired muscle performance - neuro re-education  Decreased function - therapeutic activities  Impaired posture - neuro re-education  STG/LTGs have been met or progress has been made towards goals:  Yes (See Goal flow sheet completed today.)  Assessment of Progress: The patient's progress has slowed.  Self Management Plans:  Patient has been instructed in a home treatment program.  Patient  has been instructed in self  management of symptoms.    June continues to require the following intervention to meet STG and LTG's:  PT    Recommendations:  This patient would benefit from continued therapy.     Frequency:  2 X a month, once daily  Duration:  for 2 months        Please refer to the daily flowsheet for treatment today, total treatment time and time spent performing 1:1 timed codes.

## 2023-01-30 ENCOUNTER — THERAPY VISIT (OUTPATIENT)
Dept: PHYSICAL THERAPY | Facility: CLINIC | Age: 58
End: 2023-01-30
Payer: COMMERCIAL

## 2023-01-30 DIAGNOSIS — M53.3 PAIN IN THE COCCYX: ICD-10-CM

## 2023-01-30 DIAGNOSIS — M25.561 ACUTE PAIN OF RIGHT KNEE: Primary | ICD-10-CM

## 2023-01-30 PROCEDURE — 97140 MANUAL THERAPY 1/> REGIONS: CPT | Mod: GP | Performed by: PHYSICAL THERAPIST

## 2023-03-02 ENCOUNTER — THERAPY VISIT (OUTPATIENT)
Dept: PHYSICAL THERAPY | Facility: CLINIC | Age: 58
End: 2023-03-02
Payer: COMMERCIAL

## 2023-03-02 DIAGNOSIS — M53.3 PAIN IN THE COCCYX: ICD-10-CM

## 2023-03-02 DIAGNOSIS — M25.561 ACUTE PAIN OF RIGHT KNEE: Primary | ICD-10-CM

## 2023-03-02 PROCEDURE — 97140 MANUAL THERAPY 1/> REGIONS: CPT | Mod: GP | Performed by: PHYSICAL THERAPIST

## 2023-03-22 ENCOUNTER — THERAPY VISIT (OUTPATIENT)
Dept: PHYSICAL THERAPY | Facility: CLINIC | Age: 58
End: 2023-03-22
Payer: COMMERCIAL

## 2023-03-22 DIAGNOSIS — M25.561 ACUTE PAIN OF RIGHT KNEE: Primary | ICD-10-CM

## 2023-03-22 DIAGNOSIS — M53.3 PAIN IN THE COCCYX: ICD-10-CM

## 2023-03-22 PROCEDURE — 97140 MANUAL THERAPY 1/> REGIONS: CPT | Mod: GP | Performed by: PHYSICAL THERAPIST

## 2023-04-16 ENCOUNTER — HEALTH MAINTENANCE LETTER (OUTPATIENT)
Age: 58
End: 2023-04-16

## 2023-04-19 ENCOUNTER — THERAPY VISIT (OUTPATIENT)
Dept: PHYSICAL THERAPY | Facility: CLINIC | Age: 58
End: 2023-04-19
Payer: COMMERCIAL

## 2023-04-19 DIAGNOSIS — M53.3 PAIN IN THE COCCYX: ICD-10-CM

## 2023-04-19 DIAGNOSIS — M25.561 ACUTE PAIN OF RIGHT KNEE: Primary | ICD-10-CM

## 2023-04-19 PROCEDURE — 97140 MANUAL THERAPY 1/> REGIONS: CPT | Mod: GP | Performed by: PHYSICAL THERAPIST

## 2023-04-19 PROCEDURE — 97535 SELF CARE MNGMENT TRAINING: CPT | Mod: GP | Performed by: PHYSICAL THERAPIST

## 2023-04-19 NOTE — PROGRESS NOTES
Subjective:  HPI  Physical Exam                    Objective:  System    Physical Exam    General     ROS    Assessment/Plan:    PROGRESS  REPORT    Progress reporting period is from 01/13/23 to 04/19/23.       SUBJECTIVE  Subjective changes noted by patient:    Subjective: Trip to [place] went well. B ischial tube pain is better. Some increased coccyx pain due to sitting on hard wood chair.Still some urgency/close calls with urination. Occasional stress incontinece.    Current pain level is 4/10  .     Previous pain level was   Initial Pain level: 7/10.   Changes in function:  Yes (See Goal flowsheet attached for changes in current functional level)  Adverse reaction to treatment or activity: None    OBJECTIVE  Changes noted in objective findings:    Objective: Rev'd importance of quick flicks and urge suppression. Encouraged roll in with kegel/TA in sitting daily. Coccyx slight deviation to L, less flexed but more deviated. TTP at distal end of coccyx.     ASSESSMENT/PLAN  Updated problem list and treatment plan: Diagnosis 1:  Coccyx pain/pelvic floor dysfunction  Pain -  hot/cold therapy, manual therapy, self management, education, directional preference exercise and home program  Decreased ROM/flexibility - manual therapy and therapeutic exercise  Decreased strength - therapeutic exercise and therapeutic activities  Decreased proprioception - neuro re-education and therapeutic activities  Inflammation - self management/home program  Impaired muscle performance - neuro re-education  Decreased function - therapeutic activities  STG/LTGs have been met or progress has been made towards goals:  Yes (See Goal flow sheet completed today.)  Assessment of Progress: The patient's condition is improving.  Self Management Plans:  Patient has been instructed in a home treatment program.  Patient  has been instructed in self management of symptoms.    Selene continues to require the following intervention to meet STG and LTG's:   PT    Recommendations:  This patient would benefit from continued therapy.     Frequency:  1 X a month, once daily  Duration:  for 2 months        Please refer to the daily flowsheet for treatment today, total treatment time and time spent performing 1:1 timed codes.

## 2023-05-11 ENCOUNTER — LAB REQUISITION (OUTPATIENT)
Dept: LAB | Facility: CLINIC | Age: 58
End: 2023-05-11

## 2023-05-11 DIAGNOSIS — J02.9 ACUTE PHARYNGITIS, UNSPECIFIED: ICD-10-CM

## 2023-05-11 PROCEDURE — 87081 CULTURE SCREEN ONLY: CPT | Performed by: NURSE PRACTITIONER

## 2023-05-13 LAB — BACTERIA SPEC CULT: NORMAL

## 2023-05-23 ENCOUNTER — THERAPY VISIT (OUTPATIENT)
Dept: PHYSICAL THERAPY | Facility: CLINIC | Age: 58
End: 2023-05-23
Payer: COMMERCIAL

## 2023-05-23 DIAGNOSIS — M25.561 ACUTE PAIN OF RIGHT KNEE: Primary | ICD-10-CM

## 2023-05-23 DIAGNOSIS — M53.3 PAIN IN THE COCCYX: ICD-10-CM

## 2023-05-23 PROCEDURE — 97140 MANUAL THERAPY 1/> REGIONS: CPT | Mod: GP | Performed by: PHYSICAL THERAPIST

## 2023-07-06 ENCOUNTER — THERAPY VISIT (OUTPATIENT)
Dept: PHYSICAL THERAPY | Facility: CLINIC | Age: 58
End: 2023-07-06
Payer: COMMERCIAL

## 2023-07-06 DIAGNOSIS — M53.3 PAIN IN THE COCCYX: ICD-10-CM

## 2023-07-06 DIAGNOSIS — M25.561 ACUTE PAIN OF RIGHT KNEE: Primary | ICD-10-CM

## 2023-07-06 PROCEDURE — 97110 THERAPEUTIC EXERCISES: CPT | Mod: GP | Performed by: PHYSICAL THERAPIST

## 2023-07-06 PROCEDURE — 97140 MANUAL THERAPY 1/> REGIONS: CPT | Mod: GP | Performed by: PHYSICAL THERAPIST

## 2023-07-06 NOTE — PROGRESS NOTES
07/06/23 0500   Appointment Info   Signing clinician's name / credentials Nicki Kapadia, PT, OCS   Total/Authorized Visits EPIC 12/20/22   Visits Used 26   PT Tx Diagnosis coccyx pain/urgency   Other pertinent information [email or web address]   Progress Note/Certification   Progress Note Completed Date 07/06/23   PT Goal 1   Goal Identifier sitting   Goal Description Hours patient will be able to sit in any chair 2   Rationale   (for personal hygiene;to allow rest from standing;for community transportation;for job requirements in their work place)   Goal Progress flare up from lots of driving   Target Date 08/31/23   Subjective Report   Subjective Report Has been quite sore in B sit bones. Coccyx not too bad. Has been extremely busy with daughter's graduation, etc. Quite stressed/busy with that and also traveling a lot to [place].Massage x 3 helped somewhat but still feels tight.   Objective Measures   Objective Measures Objective Measure 1   Objective Measure 1   Objective Measure Good coccyx alignment L/R. TTP distal coccyx/sacrum but minimal. Most tension/tightness noted B proximal to mid-hamstring.   Treatment Interventions (PT)   Interventions Manual Therapy;Therapeutic Procedure/Exercise   Therapeutic Procedure/Exercise   Therapeutic Procedures: strength, endurance, ROM, flexibillity minutes (07534) 10   Ther Proc 1 standing lumbar extension 2-3 sets of 10 daily   Ther Proc 1 - Details seated hamstring/nerve floss every hour during day, 3-5 reps each side   Skilled Intervention to decrease neural tone and hamstring mobility   Patient Response/Progress demo'd good understanding   Manual Therapy   Manual Therapy: Mobilization, MFR, MLD, friction massage minutes (30472) 30   Manual Therapy 1 prone B ischial tube/proximal hamstring and mid-hamstring with Graston 3 and manual by PT x 22 min   Skilled Intervention to decrease hamstring/neural tension   Patient Response/Progress good response with less  tenderness post treatment   Manual Therapy 1 - Details also to B distal sacrum x8 min   Education   Learner/Method Patient;No Barriers to Learning   Plan   Home program PTRX HEP   Plan for next session assess effects of seated nerve glide and standing back bends   Total Session Time   Timed Code Treatment Minutes 40   Total Treatment Time (sum of timed and untimed services) 40         PLAN  Continue therapy per current plan of care.    Beginning/End Dates of Progress Note Reporting Period:  07/06/23 to 07/06/2023    Referring Provider:  No ref. provider found

## 2023-07-25 ENCOUNTER — THERAPY VISIT (OUTPATIENT)
Dept: PHYSICAL THERAPY | Facility: CLINIC | Age: 58
End: 2023-07-25
Payer: COMMERCIAL

## 2023-07-25 DIAGNOSIS — M53.3 PAIN IN THE COCCYX: ICD-10-CM

## 2023-07-25 DIAGNOSIS — M25.561 ACUTE PAIN OF RIGHT KNEE: Primary | ICD-10-CM

## 2023-07-25 PROCEDURE — 97140 MANUAL THERAPY 1/> REGIONS: CPT | Mod: GP | Performed by: PHYSICAL THERAPIST

## 2023-07-25 PROCEDURE — 97535 SELF CARE MNGMENT TRAINING: CPT | Mod: GP | Performed by: PHYSICAL THERAPIST

## 2023-08-09 ENCOUNTER — THERAPY VISIT (OUTPATIENT)
Dept: PHYSICAL THERAPY | Facility: CLINIC | Age: 58
End: 2023-08-09
Payer: COMMERCIAL

## 2023-08-09 DIAGNOSIS — M53.3 PAIN IN THE COCCYX: ICD-10-CM

## 2023-08-09 DIAGNOSIS — M25.561 CHRONIC PAIN OF RIGHT KNEE: Primary | ICD-10-CM

## 2023-08-09 DIAGNOSIS — G89.29 CHRONIC PAIN OF RIGHT KNEE: Primary | ICD-10-CM

## 2023-08-09 PROCEDURE — 97140 MANUAL THERAPY 1/> REGIONS: CPT | Mod: GP | Performed by: PHYSICAL THERAPIST

## 2023-08-09 PROCEDURE — 97110 THERAPEUTIC EXERCISES: CPT | Mod: GP | Performed by: PHYSICAL THERAPIST

## 2023-08-14 NOTE — PROGRESS NOTES
Physical Therapy Initial Evaluation  Subjective:  Patient is referred with c/o R knee pain and swelling. Recent episode began in July 2021 when she woke one day with stiffness and swelling in R knee. She had a similar issue about one year ago which responded well to PT. Notes pain with descending stairs, squatting and kneeling. No recent dx tests performed.    The history is provided by the patient.   Therapist Generated HPI Evaluation         Type of problem:  Right knee.    This is a recurrent condition.  Condition occurred with:  Insidious onset.  Where condition occurred: at home.  Patient reports pain:  In the joint, medial, posterior and lateral.  Pain is described as aching and is intermittent.  Pain radiates to:  Lower leg. Pain is the same all the time.  Since onset symptoms are gradually improving.  Associated symptoms:  Buckling/giving out, loss of motion/stiffness, edema and loss of strength. Symptoms are exacerbated by descending stairs, bending/squatting and kneeling  and relieved by rest.    Previous treatment includes physical therapy. There was significant improvement following previous treatment.  Restrictions due to condition include:  Working in normal job without restrictions.  Barriers include:  None as reported by patient.    Patient Health History  Selene Lyn being seen for R knee pain.          Pain is reported as 6/10 on pain scale.    Pertinent medical history includes: asthma, menopausal, numbness/tingling and sleep disorder/apnea.   Red flags:  None as reported by patient.         Current medications:  None.    Current occupation is .   Primary job tasks include:  Computer work and prolonged sitting.                                    Objective:  System                                                Knee Evaluation:  ROM:      PROM    Hyperextension: Left: 3    Right:  3    Flexion: Left: 152    Right:  145      Strength:     Extension:  Left: 5/5   Pain:      Right: 5/5    Pain:  Flexion:  Left: 5/5   Pain:      Right: 5/5   Pain:    Quad Set Left: WNL    Pain:   Quad Set Right: WNL    Pain:  Ligament Testing:  Normal                Special Tests:     Right knee positive for the following tests:  Meniscal  Palpation:      Right knee tenderness present at:  Medial Joint Line  Edema:    Circumference:      Joint Line:  Left:  35.3   Right:  37.5    Mobility Testing:  Normal            Functional Testing:          Quad:    Single Leg Squat:  Left:      Right:        Bilateral Leg Squat:   Normal control              General     ROS    Assessment/Plan:    Patient is a 56 year old female with right side knee complaints.    Patient has the following significant findings with corresponding treatment plan.                Diagnosis 1:  R knee pain  Pain -  hot/cold therapy, self management, education and home program  Decreased ROM/flexibility - therapeutic exercise and home program  Edema - cold therapy  Impaired muscle performance - neuro re-education and home program  Decreased function - therapeutic activities and home program    Therapy Evaluation Codes:   1) History comprised of:   Personal factors that impact the plan of care:      Time since onset of symptoms.    Comorbidity factors that impact the plan of care are:      None.     Medications impacting care: None.  2) Examination of Body Systems comprised of:   Body structures and functions that impact the plan of care:      Knee.   Activity limitations that impact the plan of care are:      Squatting/kneeling, Stairs and Walking.  3) Clinical presentation characteristics are:   Evolving/Changing.  4) Decision-Making    Moderate complexity using standardized patient assessment instrument and/or measureable assessment of functional outcome.  Cumulative Therapy Evaluation is: Moderate complexity.    Previous and current functional limitations:  (See Goal Flow Sheet for this information)    Short term and Long term goals: (See Goal Flow  Sheet for this information)     Communication ability:  Patient appears to be able to clearly communicate and understand verbal and written communication and follow directions correctly.  Treatment Explanation - The following has been discussed with the patient:   RX ordered/plan of care  Anticipated outcomes  Possible risks and side effects  This patient would benefit from PT intervention to resume normal activities.   Rehab potential is excellent.    Frequency:  1 X week, once daily  Duration:  for 4 weeks  Discharge Plan:  Achieve all LTG.  Independent in home treatment program.  Reach maximal therapeutic benefit.    Please refer to the daily flowsheet for treatment today, total treatment time and time spent performing 1:1 timed codes.        Cibinqo Counseling: I discussed with the patient the risks of Cibinqo therapy including but not limited to common cold, nausea, headache, cold sores, increased blood CPK levels, dizziness, UTIs, fatigue, acne, and vomitting. Live vaccines should be avoided.  This medication has been linked to serious infections; higher rate of mortality; malignancy and lymphoproliferative disorders; major adverse cardiovascular events; thrombosis; thrombocytopenia and lymphopenia; lipid elevations; and retinal detachment.

## 2023-09-19 ENCOUNTER — THERAPY VISIT (OUTPATIENT)
Dept: PHYSICAL THERAPY | Facility: CLINIC | Age: 58
End: 2023-09-19
Payer: COMMERCIAL

## 2023-09-19 DIAGNOSIS — G89.29 CHRONIC PAIN OF RIGHT KNEE: Primary | ICD-10-CM

## 2023-09-19 DIAGNOSIS — M25.561 CHRONIC PAIN OF RIGHT KNEE: Primary | ICD-10-CM

## 2023-09-19 DIAGNOSIS — M53.3 PAIN IN THE COCCYX: ICD-10-CM

## 2023-09-19 PROCEDURE — 97140 MANUAL THERAPY 1/> REGIONS: CPT | Mod: GP | Performed by: PHYSICAL THERAPIST

## 2023-11-30 ENCOUNTER — THERAPY VISIT (OUTPATIENT)
Dept: PHYSICAL THERAPY | Facility: CLINIC | Age: 58
End: 2023-11-30
Payer: COMMERCIAL

## 2023-11-30 DIAGNOSIS — M25.561 CHRONIC PAIN OF RIGHT KNEE: Primary | ICD-10-CM

## 2023-11-30 DIAGNOSIS — M53.3 PAIN IN THE COCCYX: ICD-10-CM

## 2023-11-30 DIAGNOSIS — G89.29 CHRONIC PAIN OF RIGHT KNEE: Primary | ICD-10-CM

## 2023-11-30 PROCEDURE — 97110 THERAPEUTIC EXERCISES: CPT | Mod: GP | Performed by: PHYSICAL THERAPIST

## 2023-11-30 PROCEDURE — 97140 MANUAL THERAPY 1/> REGIONS: CPT | Mod: GP | Performed by: PHYSICAL THERAPIST

## 2023-12-03 NOTE — PROGRESS NOTES
11/30/23 0500   Appointment Info   Signing clinician's name / credentials Nicki Kapadia, PT, OCS   Total/Authorized Visits EPIC 12/20/22, will be getting new orders on 01/09/24   Visits Used 30   PT Tx Diagnosis coccyx pain/urgency/B hamstring pain   Other pertinent information [id number]   Progress Note/Certification   Progress Note Due Date 01/30/24   Progress Note Completed Date 11/30/23   PT Goal 1   Goal Identifier sitting   Goal Description Hours patient will be able to sit in any chair 2   Rationale   (for personal hygiene;to allow rest from standing;for community transportation;for job requirements in their work place)   Goal Progress flare up from lots of driving   Target Date 01/25/24   Subjective Report   Subjective Report Spent the last 8 weeks in [place] living in new place. Now back in [place] until Feb. 2024. Tailbone doing pretty good. Hamstring insertions a bit more irritated so saw massage therapist and helped a lot. Working on walking, hamstring/nerve glides   Objective Measures   Objective Measures Objective Measure 1   Objective Measure 1   Objective Measure Improving B ischial tuberosity/proximal hamstring tightness/tension. Mild tension noted today. TTP around distal sacrum.   Treatment Interventions (PT)   Interventions Manual Therapy;Therapeutic Procedure/Exercise   Therapeutic Procedure/Exercise   Therapeutic Procedures: strength, endurance, ROM, flexibillity minutes (11843) 8   Ther Proc 1 standing lumbar extension 2-3 sets of 10 daily   Ther Proc 1 - Details seated hamstring/nerve floss every hour during day, 3-5 reps each side   PTRx Ther Proc 1 rev'd techniques/positions with nerve glide   PTRx Ther Proc 2 - Details begin quad sets 5 sec throughout the dayu   Skilled Intervention to improve nerve/hamstring mobility   Patient Response/Progress good demonstration   Manual Therapy   Manual Therapy: Mobilization, MFR, MLD, friction massage minutes (88017) 30   Manual Therapy 1 prone B  ischial tube/proximal hamstring and mid-hamstring with Graston 3 and manual by PT x 22 min   Manual Therapy 1 - Details also to B distal sacrum x8 min   Skilled Intervention to improve tension in pelvis   Patient Response/Progress good response to Graston   Self Care/home Management   Self Care 1 toileting position   Skilled Intervention to encourage full bladder emptying   Patient Response/Progress good verbal understanding   Education   Learner/Method Patient;No Barriers to Learning   Plan   Home program PTRX HEP   Plan for next session check on updated orders   Total Session Time   Timed Code Treatment Minutes 38   Total Treatment Time (sum of timed and untimed services) 38         PLAN  Continue therapy per current plan of care.    Beginning/End Dates of Progress Note Reporting Period:  11/30/23 to 11/30/2023    Referring Provider:  Referred Self

## 2024-01-08 ENCOUNTER — THERAPY VISIT (OUTPATIENT)
Dept: PHYSICAL THERAPY | Facility: CLINIC | Age: 59
End: 2024-01-08
Payer: COMMERCIAL

## 2024-01-08 DIAGNOSIS — M25.561 CHRONIC PAIN OF RIGHT KNEE: Primary | ICD-10-CM

## 2024-01-08 DIAGNOSIS — M53.3 PAIN IN THE COCCYX: ICD-10-CM

## 2024-01-08 DIAGNOSIS — G89.29 CHRONIC PAIN OF RIGHT KNEE: Primary | ICD-10-CM

## 2024-01-08 PROCEDURE — 97110 THERAPEUTIC EXERCISES: CPT | Mod: GP | Performed by: PHYSICAL THERAPIST

## 2024-01-08 PROCEDURE — 97140 MANUAL THERAPY 1/> REGIONS: CPT | Mod: GP | Performed by: PHYSICAL THERAPIST

## 2024-01-10 ENCOUNTER — TRANSCRIBE ORDERS (OUTPATIENT)
Dept: OTHER | Age: 59
End: 2024-01-10

## 2024-01-10 DIAGNOSIS — M53.3 TAIL BONE PAIN: ICD-10-CM

## 2024-01-10 DIAGNOSIS — M62.89 HAMSTRING TIGHTNESS: Primary | ICD-10-CM

## 2024-02-04 ENCOUNTER — HEALTH MAINTENANCE LETTER (OUTPATIENT)
Age: 59
End: 2024-02-04

## 2024-02-06 ENCOUNTER — LAB REQUISITION (OUTPATIENT)
Dept: LAB | Facility: CLINIC | Age: 59
End: 2024-02-06

## 2024-02-06 DIAGNOSIS — J02.9 ACUTE PHARYNGITIS, UNSPECIFIED: ICD-10-CM

## 2024-02-06 PROCEDURE — 87070 CULTURE OTHR SPECIMN AEROBIC: CPT | Performed by: STUDENT IN AN ORGANIZED HEALTH CARE EDUCATION/TRAINING PROGRAM

## 2024-02-08 LAB — BACTERIA SPEC CULT: NORMAL

## 2024-02-12 ENCOUNTER — THERAPY VISIT (OUTPATIENT)
Dept: PHYSICAL THERAPY | Facility: CLINIC | Age: 59
End: 2024-02-12
Payer: COMMERCIAL

## 2024-02-12 DIAGNOSIS — M25.561 CHRONIC PAIN OF RIGHT KNEE: Primary | ICD-10-CM

## 2024-02-12 DIAGNOSIS — G89.29 CHRONIC PAIN OF RIGHT KNEE: Primary | ICD-10-CM

## 2024-02-12 DIAGNOSIS — M53.3 PAIN IN THE COCCYX: ICD-10-CM

## 2024-02-12 PROCEDURE — 97140 MANUAL THERAPY 1/> REGIONS: CPT | Mod: GP | Performed by: PHYSICAL THERAPIST

## 2024-02-12 NOTE — PROGRESS NOTES
02/12/24 0500   Appointment Info   Signing clinician's name / credentials Nicki Kapadia, PT, OCS   Total/Authorized Visits EPIC 01/10/24 updated.   Visits Used 32   PT Tx Diagnosis coccyx pain/urgency/B hamstring pain   Other pertinent information [id number]   Progress Note/Certification   Progress Note Due Date 01/30/24   Progress Note Completed Date 02/12/24   PT Goal 1   Goal Identifier sitting   Goal Description Hours patient will be able to sit in any chair 2   Rationale   (for personal hygiene;to allow rest from standing;for community transportation;for job requirements in their work place)   Goal Progress still pain with sitting, needs cushion   Target Date 04/08/24   Subjective Report   Subjective Report [place]aving for [place] end of this week and will return in April/May. For the most part, tailbone a little more irritated b/c of being sick (laying down/sitting on couch). Some prickling. B hamstrings tightness comes/goes. Uses U-shape cushion on a regular basis.   Objective Measures   Objective Measures Objective Measure 1   Objective Measure 1   Objective Measure TTP B ischial tuberosities/hamstring insertion. Distal sacrum tenderness. Able to palpate coccyx.   Treatment Interventions (PT)   Interventions Manual Therapy   Therapeutic Procedure/Exercise   Ther Proc 1 standing lumbar extension 2-3 sets of 10 daily   Ther Proc 1 - Details seated hamstring/nerve floss every hour during day, 3-5 reps each side   PTRx Ther Proc 1 Hamstring Flossing with a Ball   PTRx Ther Proc 1 - Details No Notes   PTRx Ther Proc 2 Standing Extension   PTRx Ther Proc 2 - Details No Notes   PTRx Ther Proc 3 Prone Press Ups   PTRx Ther Proc 3 - Details No Notes   PTRx Ther Proc 4 Roll Ins   PTRx Ther Proc 4 - Details No Notes   PTRx Ther Proc 5 All 4s Cat Cow   PTRx Ther Proc 5 - Details No Notes   PTRx Ther Proc 6 Hip Abduction Straight Leg Raise   PTRx Ther Proc 6 - Details No Notes   PTRx Ther Proc 7 Functional Knee  Extension with Tubing   PTRx Ther Proc 7 - Details No Notes   PTRx Ther Proc 8 Clamshell with Theraband   PTRx Ther Proc 8 - Details No Notes   PTRx Ther Proc 9 Ball Exercise Supine Bridging   PTRx Ther Proc 9 - Details No Notes   PTRx Ther Proc 10 Lateral Step Down   PTRx Ther Proc 10 - Details No Notes   Skilled Intervention to improve nerve/hamstring mobility/GLUTE/CORE STRENGTH   Patient Response/Progress UPDATED ON PTRX   Therapeutic Activity   PTRx Ther Act 1 Urge Incontinence Suppression Techniques   Manual Therapy   Manual Therapy: Mobilization, MFR, MLD, friction massage minutes (80083) 35   Manual Therapy 1 prone B ischial tube/proximal hamstring and mid-hamstring with Graston 3 and manual by PT x 25 min   Manual Therapy 1 - Details Graston 3 to distal sacrum/coccyx   Skilled Intervention to improve tension in pelvis   Patient Response/Progress good response to Graston   Self Care/home Management   Self Care 1 toileting position   Education   Learner/Method Patient;No Barriers to Learning   Plan   Home program PTRX HEP   Plan for next session traveling back to [place]   Total Session Time   Timed Code Treatment Minutes 35   Total Treatment Time (sum of timed and untimed services) 35         PLAN  Continue therapy per current plan of care.    Beginning/End Dates of Progress Note Reporting Period:  02/12/24 to 02/12/2024    Referring Provider:  Sammie Martin

## 2024-05-16 ENCOUNTER — THERAPY VISIT (OUTPATIENT)
Dept: PHYSICAL THERAPY | Facility: CLINIC | Age: 59
End: 2024-05-16
Payer: COMMERCIAL

## 2024-05-16 DIAGNOSIS — M53.3 PAIN IN THE COCCYX: ICD-10-CM

## 2024-05-16 DIAGNOSIS — G89.29 CHRONIC PAIN OF RIGHT KNEE: Primary | ICD-10-CM

## 2024-05-16 DIAGNOSIS — M25.561 CHRONIC PAIN OF RIGHT KNEE: Primary | ICD-10-CM

## 2024-05-16 PROCEDURE — 97110 THERAPEUTIC EXERCISES: CPT | Mod: GP | Performed by: PHYSICAL THERAPIST

## 2024-05-16 NOTE — PROGRESS NOTES
05/16/24 0500   Appointment Info   Signing clinician's name / credentials Nicki Kapadia, PT, OCS   Total/Authorized Visits EPIC 01/10/24 updated.   Visits Used 33   PT Tx Diagnosis coccyx pain/urgency/B hamstring pain   Other pertinent information [id number]   Progress Note/Certification   Progress Note Due Date 05/16/24   Progress Note Completed Date 05/16/24   PT Goal 1   Goal Identifier stairs   Goal Description descend painfree   Rationale to maximize safety and independence with performance of ADLs and functional tasks;to maximize safety and independence within the community;to maximize safety and independence within the home;to maximize safety and independence with transportation;to maximize safety and independence with self cares   Target Date 08/08/24   Subjective Report   Subjective Report Returned from [place]. Tailbone/hamstring pain is about the best it's ever been. However R knee seems to be flared up.Joined body pump class in [place] and walked 4-5x miles/day. R knee worse with going downstairs, sharp pins in anterior knee.Also dealing with sister on hospice so has been very busy with life since returning to [place].   Objective Measures   Objective Measures Objective Measure 1   Objective Measure 1   Objective Measure Decreased quad strength R with lateral and forward step down. Able to do full squat up/down painfree. SLS fair R and L.   Treatment Interventions (PT)   Interventions Therapeutic Procedure/Exercise   Therapeutic Procedure/Exercise   Therapeutic Procedures: strength, endurance, ROM, flexibility minutes (93647) 35   Ther Proc 1 standing lumbar extension 2-3 sets of 10 daily   Ther Proc 1 - Details seated hamstring/nerve floss every hour during day, 3-5 reps each side   PTRx Ther Proc 1 Hamstring Flossing with a Ball   PTRx Ther Proc 1 - Details No Notes   PTRx Ther Proc 2 Standing Extension   PTRx Ther Proc 2 - Details No Notes   PTRx Ther Proc 3 Prone Press Ups   PTRx Ther Proc 3 - Details No  Notes   PTRx Ther Proc 4 Roll Ins   PTRx Ther Proc 4 - Details No Notes   PTRx Ther Proc 5 All 4s Cat Cow   PTRx Ther Proc 5 - Details No Notes   PTRx Ther Proc 6 Hip Abduction Straight Leg Raise   PTRx Ther Proc 6 - Details No Notes   PTRx Ther Proc 7 Functional Knee Extension with Tubing   PTRx Ther Proc 7 - Details No Notes   PTRx Ther Proc 8 Clamshell with Theraband   PTRx Ther Proc 8 - Details No Notes   PTRx Ther Proc 9 Ball Exercise Supine Bridging   PTRx Ther Proc 9 - Details No Notes   PTRx Ther Proc 10 Forward step down   PTRx Ther Proc 10 - Details 4-6 inch step, 2x 15 each leg, 3-4 x week   PTRx Ther Proc 11 - Details leg press at gym: B 2x15 at # and SL 2x 15 at ~ 40-50#   Skilled Intervention to improve R quad control/strength   Patient Response/Progress good technique, shaky with eccentric quad work.   Therapeutic Activity   PTRx Ther Act 1 Urge Incontinence Suppression Techniques   Education   Learner/Method Patient;No Barriers to Learning   Plan   Home program [facility]   Plan for next session assess step down/R knee, check on sister   Total Session Time   Timed Code Treatment Minutes 35   Total Treatment Time (sum of timed and untimed services) 35         PLAN  Continue therapy per current plan of care.    Beginning/End Dates of Progress Note Reporting Period:  05/16/24 to 05/16/2024    Referring Provider:  Sammie Martin

## 2024-07-31 ENCOUNTER — THERAPY VISIT (OUTPATIENT)
Dept: PHYSICAL THERAPY | Facility: CLINIC | Age: 59
End: 2024-07-31
Payer: COMMERCIAL

## 2024-07-31 DIAGNOSIS — M53.3 PAIN IN THE COCCYX: ICD-10-CM

## 2024-07-31 DIAGNOSIS — G89.29 CHRONIC PAIN OF RIGHT KNEE: Primary | ICD-10-CM

## 2024-07-31 DIAGNOSIS — M25.561 CHRONIC PAIN OF RIGHT KNEE: Primary | ICD-10-CM

## 2024-07-31 PROCEDURE — 97530 THERAPEUTIC ACTIVITIES: CPT | Mod: GP | Performed by: PHYSICAL THERAPIST

## 2024-07-31 PROCEDURE — 97140 MANUAL THERAPY 1/> REGIONS: CPT | Mod: GP | Performed by: PHYSICAL THERAPIST

## 2024-09-01 ENCOUNTER — HEALTH MAINTENANCE LETTER (OUTPATIENT)
Age: 59
End: 2024-09-01

## 2024-09-05 ENCOUNTER — THERAPY VISIT (OUTPATIENT)
Dept: PHYSICAL THERAPY | Facility: CLINIC | Age: 59
End: 2024-09-05
Payer: COMMERCIAL

## 2024-09-05 DIAGNOSIS — M25.561 CHRONIC PAIN OF RIGHT KNEE: Primary | ICD-10-CM

## 2024-09-05 DIAGNOSIS — M53.3 PAIN IN THE COCCYX: ICD-10-CM

## 2024-09-05 DIAGNOSIS — G89.29 CHRONIC PAIN OF RIGHT KNEE: Primary | ICD-10-CM

## 2024-09-05 PROCEDURE — 97530 THERAPEUTIC ACTIVITIES: CPT | Mod: GP | Performed by: PHYSICAL THERAPIST

## 2024-09-05 PROCEDURE — 97140 MANUAL THERAPY 1/> REGIONS: CPT | Mod: GP | Performed by: PHYSICAL THERAPIST

## 2024-09-07 NOTE — PROGRESS NOTES
09/05/24 0500   Appointment Info   Signing clinician's name / credentials Nicki Kapadia, PT, OCS   Total/Authorized Visits EPIC 01/10/24 updated.   Visits Used 35   PT Tx Diagnosis coccyx pain/urgency/B hamstring pain   Other pertinent information [id number]   Progress Note/Certification   Progress Note Due Date 08/06/24   Progress Note Completed Date 09/05/24   GOALS   PT Goals 2   PT Goal 1   Goal Identifier stairs   Goal Description descend painfree   Rationale to maximize safety and independence with performance of ADLs and functional tasks;to maximize safety and independence within the community;to maximize safety and independence within the home;to maximize safety and independence with transportation;to maximize safety and independence with self cares   Goal Progress was met but now flared up from a lot of yardwork   Target Date 10/31/24   PT Goal 2   Goal Identifier Able to sit 2+ hours   Goal Description 2/10 pain in B ischial tuberosities or less   Rationale to maximize safety and independence with performance of ADLs and functional tasks;to maximize safety and independence within the home;to maximize safety and independence within the community;to maximize safety and independence with transportation;to maximize safety and independence with self cares   Target Date 10/31/24   Subjective Report   Subjective Report Got daughter to college so had been sitting while traveling. Also more landscaping/yardwork so R hamstring a bit more sore. Also B sit bones more sore. Heading back to [facility] 09/30/24.R knee a bit more flared up too.   Objective Measures   Objective Measures Objective Measure 1;Objective Measure 2   Objective Measure 1   Objective Measure R knee slight swollen posterior knee, pain with end range knee flexion   Details Slight loss R knee extension also   Objective Measure 2   Objective Measure R>L hamstring tightness   Treatment Interventions (PT)   Interventions Therapeutic  Activity;Manual Therapy   Therapeutic Procedure/Exercise   Ther Proc 1 standing lumbar extension 2-3 sets of 10 daily   Ther Proc 1 - Details seated hamstring/nerve floss every hour during day, 3-5 reps each side   PTRx Ther Proc 1 Hamstring Flossing with a Ball   PTRx Ther Proc 1 - Details No Notes   PTRx Ther Proc 2 Standing Extension   PTRx Ther Proc 2 - Details No Notes   PTRx Ther Proc 3 Prone Press Ups   PTRx Ther Proc 3 - Details No Notes   PTRx Ther Proc 4 Roll Ins   PTRx Ther Proc 4 - Details No Notes   PTRx Ther Proc 5 All 4s Cat Cow   PTRx Ther Proc 5 - Details No Notes   PTRx Ther Proc 6 Hip Abduction Straight Leg Raise   PTRx Ther Proc 6 - Details No Notes   PTRx Ther Proc 7 Functional Knee Extension with Tubing   PTRx Ther Proc 7 - Details No Notes   PTRx Ther Proc 8 Clamshell with Theraband   PTRx Ther Proc 8 - Details No Notes   PTRx Ther Proc 9 Ball Exercise Supine Bridging   PTRx Ther Proc 9 - Details No Notes   PTRx Ther Proc 10 Forward step down   PTRx Ther Proc 10 - Details 4-6 inch step, 2x 15 each leg, 3-4 x week   PTRx Ther Proc 11 - Details leg press at gym: B 2x15 at # and SL 2x 15 at ~ 40-50#   Therapeutic Activity   Therapeutic Activities: dynamic activities to improve functional performance minutes (72347) 15   Ther Act 1 Discussed bladder irritants, try to decrease caffeine and carbonation   PTRx Ther Act 1 Urge Incontinence Suppression Techniques   PTRx Ther Act 1 - Details rev'd techniques   Skilled Intervention to decrease R knee swelling   Patient Response/Progress good understanding   Ther Act 1 - Details encouraged ice 2x day for R knee   Manual Therapy   Manual Therapy: Mobilization, MFR, MLD, friction massage minutes (40488) 25   Manual Therapy 1 prone MFR to B proximal hamstrings  (insertion primarily)   Skilled Intervention to improved flexibility at hamstrings   Patient Response/Progress improved flexibility post-treatment   Education   Learner/Method Patient;No  Barriers to Learning   Plan   Home program [facility]   Plan for next session rev questions as leaving for a few months   Total Session Time   Timed Code Treatment Minutes 40   Total Treatment Time (sum of timed and untimed services) 40       PLAN  Continue therapy per current plan of care.    Beginning/End Dates of Progress Note Reporting Period:  09/05/24 to 09/05/2024    Referring Provider:  Sammie Martin

## 2024-09-24 ENCOUNTER — THERAPY VISIT (OUTPATIENT)
Dept: PHYSICAL THERAPY | Facility: CLINIC | Age: 59
End: 2024-09-24
Payer: COMMERCIAL

## 2024-09-24 DIAGNOSIS — M53.3 PAIN IN THE COCCYX: ICD-10-CM

## 2024-09-24 DIAGNOSIS — M25.561 CHRONIC PAIN OF RIGHT KNEE: Primary | ICD-10-CM

## 2024-09-24 DIAGNOSIS — G89.29 CHRONIC PAIN OF RIGHT KNEE: Primary | ICD-10-CM

## 2024-09-24 PROCEDURE — 97110 THERAPEUTIC EXERCISES: CPT | Mod: GP | Performed by: PHYSICAL THERAPIST

## 2024-09-24 PROCEDURE — 97140 MANUAL THERAPY 1/> REGIONS: CPT | Mod: GP | Performed by: PHYSICAL THERAPIST

## 2024-12-02 ENCOUNTER — THERAPY VISIT (OUTPATIENT)
Dept: PHYSICAL THERAPY | Facility: CLINIC | Age: 59
End: 2024-12-02
Payer: COMMERCIAL

## 2024-12-02 DIAGNOSIS — M53.3 PAIN IN THE COCCYX: ICD-10-CM

## 2024-12-02 DIAGNOSIS — M25.561 CHRONIC PAIN OF RIGHT KNEE: Primary | ICD-10-CM

## 2024-12-02 DIAGNOSIS — G89.29 CHRONIC PAIN OF RIGHT KNEE: Primary | ICD-10-CM

## 2024-12-02 PROCEDURE — 97140 MANUAL THERAPY 1/> REGIONS: CPT | Mod: GP | Performed by: PHYSICAL THERAPIST

## 2024-12-02 NOTE — PROGRESS NOTES
12/02/24 0500   Appointment Info   Signing clinician's name / credentials Nicki Kapadia, PT, OCS   Total/Authorized Visits EPIC 01/10/24 updated.   Visits Used 36   PT Tx Diagnosis coccyx pain/urgency/B hamstring pain   Other pertinent information [id number]   Progress Note/Certification   Progress Note Completed Date 12/02/24   GOALS   PT Goals 2   PT Goal 1   Goal Identifier stairs   Goal Description descend painfree   Rationale to maximize safety and independence with performance of ADLs and functional tasks;to maximize safety and independence within the community;to maximize safety and independence within the home;to maximize safety and independence with transportation;to maximize safety and independence with self cares   Target Date 10/31/24   Date Met 12/02/24   PT Goal 2   Goal Identifier Able to sit 2+ hours   Goal Description 2/10 pain in B ischial tuberosities or less   Rationale to maximize safety and independence with performance of ADLs and functional tasks;to maximize safety and independence within the home;to maximize safety and independence within the community;to maximize safety and independence with transportation;to maximize safety and independence with self cares   Goal Progress flared up after doing elliptical for past 2 weeks   Target Date 01/27/25   Subjective Report   Subjective Report Was going well up until started using elliptical  ~ Nov 21st and B hamstrings a bit more sore. Continues to travel back[place]forth from [place] to [place]. Coccyx doing well. Body pump classes going great and walking 3-4 miles/day.R knee feeling good.   Objective Measures   Objective Measures Objective Measure 1;Objective Measure 2   Objective Measure 1   Objective Measure TTP L>R ischial tuberosities/proximal hamstrings   Objective Measure 2   Objective Measure Tightness B hamstrings   Treatment Interventions (PT)   Interventions Manual Therapy;Therapeutic Procedure/Exercise   Therapeutic Procedure/Exercise   Ther  Proc 1 standing lumbar extension 2-3 sets of 10 daily   Ther Proc 1 - Details seated hamstring/nerve floss every hour during day, 3-5 reps each side   PTRx Ther Proc 1 Hamstring Flossing with a Ball   PTRx Ther Proc 1 - Details No Notes   PTRx Ther Proc 2 Standing Extension   PTRx Ther Proc 2 - Details No Notes   PTRx Ther Proc 3 Prone Press Ups   PTRx Ther Proc 3 - Details No Notes   PTRx Ther Proc 4 Roll Ins   PTRx Ther Proc 4 - Details No Notes   PTRx Ther Proc 5 All 4s Cat Cow   PTRx Ther Proc 5 - Details No Notes   PTRx Ther Proc 6 Hip Abduction Straight Leg Raise   PTRx Ther Proc 6 - Details No Notes   PTRx Ther Proc 7 Functional Knee Extension with Tubing   PTRx Ther Proc 7 - Details No Notes   PTRx Ther Proc 8 Clamshell with Theraband   PTRx Ther Proc 8 - Details No Notes   PTRx Ther Proc 9 Ball Exercise Supine Bridging   PTRx Ther Proc 9 - Details No Notes   PTRx Ther Proc 10 Forward step down   PTRx Ther Proc 10 - Details 4-6 inch step, 2x 15 each leg, 3-4 x week   PTRx Ther Proc 11 hamstring nerve glide IR/ER in.out each leg x 5 reps on step stool   PTRx Ther Proc 11 - Details leg press at gym: B 2x15 at # and SL 2x 15 at ~ 40-50#   Skilled Intervention to improve proximal hamstring mobility   Patient Response/Progress good demonstration   Therapeutic Activity   Ther Act 1 Discussed bladder irritants, try to decrease caffeine and carbonation   Ther Act 1 - Details encouraged ice 2x day for R knee   PTRx Ther Act 1 Urge Incontinence Suppression Techniques   PTRx Ther Act 1 - Details rev'd techniques   Skilled Intervention to decrease R knee swelling   Patient Response/Progress good understanding   Manual Therapy   Manual Therapy: Mobilization, MFR, MLD, friction massage minutes (37029) 38   Manual Therapy 1 prone MFR to B proximal hamstrings and bellies  (insertion primarily)   Manual Therapy 1 - Details [place] and manual   Skilled Intervention to improved flexibility at hamstrings   Patient  Response/Progress improved flexibility post-treatment   Education   Learner/Method Patient;No Barriers to Learning   Plan   Home program [facility]   Plan for next session f/u 1 visit Jan6, [place] 01/10/24   Total Session Time   Timed Code Treatment Minutes 38   Total Treatment Time (sum of timed and untimed services) 38         PLAN  Continue therapy per current plan of care.    Beginning/End Dates of Progress Note Reporting Period:  12/02/24 to 12/02/2024    Referring Provider:  Sammie Martin

## 2024-12-05 ENCOUNTER — TRANSFERRED RECORDS (OUTPATIENT)
Dept: HEALTH INFORMATION MANAGEMENT | Facility: CLINIC | Age: 59
End: 2024-12-05
Payer: COMMERCIAL

## 2024-12-05 ENCOUNTER — TRANSCRIBE ORDERS (OUTPATIENT)
Dept: OTHER | Age: 59
End: 2024-12-05

## 2024-12-05 ENCOUNTER — LAB REQUISITION (OUTPATIENT)
Dept: LAB | Facility: CLINIC | Age: 59
End: 2024-12-05

## 2024-12-05 DIAGNOSIS — M62.89 HAMSTRING TIGHTNESS: Primary | ICD-10-CM

## 2024-12-05 DIAGNOSIS — Z13.220 ENCOUNTER FOR SCREENING FOR LIPOID DISORDERS: ICD-10-CM

## 2024-12-05 DIAGNOSIS — M53.3 PAIN IN THE COCCYX: ICD-10-CM

## 2024-12-05 LAB
CHOLEST SERPL-MCNC: 215 MG/DL
FASTING STATUS PATIENT QL REPORTED: YES
HDLC SERPL-MCNC: 85 MG/DL
LDLC SERPL CALC-MCNC: 111 MG/DL
NONHDLC SERPL-MCNC: 130 MG/DL
TRIGL SERPL-MCNC: 97 MG/DL

## 2024-12-05 PROCEDURE — 82465 ASSAY BLD/SERUM CHOLESTEROL: CPT | Performed by: STUDENT IN AN ORGANIZED HEALTH CARE EDUCATION/TRAINING PROGRAM

## 2025-01-06 ENCOUNTER — THERAPY VISIT (OUTPATIENT)
Dept: PHYSICAL THERAPY | Facility: CLINIC | Age: 60
End: 2025-01-06
Payer: COMMERCIAL

## 2025-01-06 DIAGNOSIS — G89.29 CHRONIC PAIN OF RIGHT KNEE: Primary | ICD-10-CM

## 2025-01-06 DIAGNOSIS — M25.561 CHRONIC PAIN OF RIGHT KNEE: Primary | ICD-10-CM

## 2025-01-06 DIAGNOSIS — M53.3 PAIN IN THE COCCYX: ICD-10-CM

## 2025-01-06 PROCEDURE — 97140 MANUAL THERAPY 1/> REGIONS: CPT | Mod: GP | Performed by: PHYSICAL THERAPIST

## 2025-02-02 ENCOUNTER — HEALTH MAINTENANCE LETTER (OUTPATIENT)
Age: 60
End: 2025-02-02

## 2025-02-07 PROBLEM — M25.561 RIGHT KNEE PAIN: Status: RESOLVED | Noted: 2020-11-17 | Resolved: 2025-02-07

## 2025-02-07 PROBLEM — M53.3 PAIN IN THE COCCYX: Status: RESOLVED | Noted: 2017-02-21 | Resolved: 2025-02-07

## 2025-03-26 ENCOUNTER — LAB REQUISITION (OUTPATIENT)
Dept: LAB | Facility: CLINIC | Age: 60
End: 2025-03-26

## 2025-03-26 DIAGNOSIS — R00.2 PALPITATIONS: ICD-10-CM

## 2025-03-26 LAB
ALBUMIN SERPL BCG-MCNC: 4.2 G/DL (ref 3.5–5.2)
ALP SERPL-CCNC: 63 U/L (ref 40–150)
ALT SERPL W P-5'-P-CCNC: 18 U/L (ref 0–50)
ANION GAP SERPL CALCULATED.3IONS-SCNC: 14 MMOL/L (ref 7–15)
AST SERPL W P-5'-P-CCNC: 21 U/L (ref 0–45)
BILIRUB SERPL-MCNC: 0.4 MG/DL
BUN SERPL-MCNC: 11.1 MG/DL (ref 8–23)
CALCIUM SERPL-MCNC: 9.4 MG/DL (ref 8.8–10.4)
CHLORIDE SERPL-SCNC: 104 MMOL/L (ref 98–107)
CREAT SERPL-MCNC: 0.83 MG/DL (ref 0.51–0.95)
EGFRCR SERPLBLD CKD-EPI 2021: 80 ML/MIN/1.73M2
GLUCOSE SERPL-MCNC: 84 MG/DL (ref 70–99)
HCO3 SERPL-SCNC: 25 MMOL/L (ref 22–29)
POTASSIUM SERPL-SCNC: 4.2 MMOL/L (ref 3.4–5.3)
PROT SERPL-MCNC: 7.3 G/DL (ref 6.4–8.3)
SODIUM SERPL-SCNC: 143 MMOL/L (ref 135–145)
TSH SERPL DL<=0.005 MIU/L-ACNC: 1.94 UIU/ML (ref 0.3–4.2)

## 2025-03-26 PROCEDURE — 80053 COMPREHEN METABOLIC PANEL: CPT | Performed by: NURSE PRACTITIONER

## 2025-03-26 PROCEDURE — 84443 ASSAY THYROID STIM HORMONE: CPT | Performed by: NURSE PRACTITIONER
